# Patient Record
Sex: MALE | Race: WHITE | Employment: OTHER | URBAN - METROPOLITAN AREA
[De-identification: names, ages, dates, MRNs, and addresses within clinical notes are randomized per-mention and may not be internally consistent; named-entity substitution may affect disease eponyms.]

---

## 2021-04-05 ENCOUNTER — HOSPITAL ENCOUNTER (OUTPATIENT)
Dept: WOUND CARE | Age: 86
Discharge: HOME | End: 2021-04-05
Admitting: SURGERY
Payer: COMMERCIAL

## 2021-04-05 VITALS
TEMPERATURE: 96.2 F | HEART RATE: 51 BPM | SYSTOLIC BLOOD PRESSURE: 152 MMHG | RESPIRATION RATE: 16 BRPM | DIASTOLIC BLOOD PRESSURE: 60 MMHG

## 2021-04-05 DIAGNOSIS — L97.912 NON-PRESSURE CHRONIC ULCER OF RIGHT LOWER LEG, WITH FAT LAYER EXPOSED (HCC): ICD-10-CM

## 2021-04-05 DIAGNOSIS — R60.0 BILATERAL LEG EDEMA: ICD-10-CM

## 2021-04-05 PROCEDURE — 99203 OFFICE O/P NEW LOW 30 MIN: CPT | Performed by: SURGERY

## 2021-04-05 PROCEDURE — 29581 APPL MULTLAYER CMPRN SYS LEG: CPT

## 2021-04-05 NOTE — DISCHARGE INSTRUCTIONS
Discharge Instructions/Wound Orders  [address]  [address] [facility], [address] [facility]  Telephone: [phone]    NAME:  Chaz Barcenas  YOB: 1931  MEDICAL RECORD NUMBER:  [id number]  DATE:  4/5/2021  Wound Care Orders:  Right medial lower leg - cleanse with saline, apply Vaseline to dry intact skin, apply xeroform, cover with ABDs, apply 3 layer wrap at 50% stretch. Home Health to change dressing 2 x week. Follow-up with MD in 1 week. Home Health skilled nursing to admit patient for skilled nursing wound care as noted above, Next dressing change due Thursday 04/08/21. Dietary:  [x] Diet as tolerated: [] Calorie Diabetic Diet:Low carb and no Sugar [x] No Added Salt:[x] Increase Protein: [] Other:Limit the amount of liquid you are drinking and avoid drinking in between meals   Activity:  [x] Activity as tolerated:  [] Patient has no activity restrictions     [] Strict Bedrest: [] Remain off Work:     [] May return to full duty work:                                   [] Return to work with restrictions:             Return Appointment:  [x] Return Appointment: With DR Lauri Quintero  in   [facility])  [] Ordered tests:    Electronically signed Linda Perez RN on 4/5/2021 at 9:44 AM     [facility]  [address] [facility] 13 April 8, 2021  10:00 arrive 9:30    Lame Kwame Pelayo 281: Should you experience any significant changes in your wound(s) or have questions about your wound care, please contact the [address] [place] [address] at [address] [place] [address] 8:00 am - 4:30. If you need help with your wound outside these hours and cannot wait until we are again available, contact your PCP or go to the hospital emergency room. PLEASE NOTE: IF YOU ARE UNABLE TO OBTAIN WOUND SUPPLIES, CONTINUE TO USE THE SUPPLIES YOU HAVE AVAILABLE UNTIL YOU ARE ABLE TO REACH US.  IT IS MOST IMPORTANT TO KEEP THE WOUND COVERED AT ALL TIMES.      Physician Signature:_______________________    Date: ___________ Time:  ____________

## 2021-04-05 NOTE — WOUND CARE
04/05/21 0715 Wound Leg lower Right;Medial;Proximal #1 04/05/21 Date First Assessed/Time First Assessed: 04/05/21 0901   Present on Hospital Admission: Yes  Wound Approximate Age at First Assessment (Weeks): 16 weeks  Primary Wound Type: Venous  Location: Leg lower  Wound Location Orientation: Right;Medial;Proxima. .. Wound Image Wound Etiology Venous Dressing Status Old drainage noted Cleansed Cleansed with saline; Soap and water Dressing/Treatment (Silver, 2 layer wrap) Wound Length (cm) 10.2 cm Wound Width (cm) 1 cm Wound Depth (cm) 0.1 cm Wound Surface Area (cm^2) 10.2 cm^2 Wound Volume (cm^3) 1.02 cm^3 Wound Assessment Granulation tissue Drainage Amount Moderate Drainage Description Serosanguinous Wound Odor None Allie-Wound/Incision Assessment Intact Edges Attached edges Wound Thickness Description Partial thickness Wound Leg lower Right;Medial;Distal #2 04/05/21 Date First Assessed/Time First Assessed: 04/05/21 0902   Present on Hospital Admission: Yes  Wound Approximate Age at First Assessment (Weeks): 16 weeks  Primary Wound Type: Venous  Location: Leg lower  Wound Location Orientation: Right;Medial;Distal ... Wound Image Wound Etiology Venous Dressing Status Old drainage noted Cleansed Cleansed with saline; Soap and water Dressing/Treatment (Silver, 2 layer wrap) Wound Length (cm) 3.6 cm Wound Width (cm) 5 cm Wound Depth (cm) 0.1 cm Wound Surface Area (cm^2) 18 cm^2 Wound Volume (cm^3) 1.8 cm^3 Wound Assessment Granulation tissue Drainage Amount Moderate Drainage Description Serosanguinous Wound Odor None Allie-Wound/Incision Assessment Intact Edges Attached edges Wound Thickness Description Partial thickness Visit Vitals BP (!) 152/60 (BP 1 Location: Right upper arm, BP Patient Position: At rest;Sitting) Pulse (!) 51 Temp (!) 96.2 °F (35.7 °C) Resp 16

## 2021-04-05 NOTE — PROGRESS NOTES
Face to Face Documentation for [address] Name: Carmen Mijares    YOB: 1931    Date of Face to Face:  4/5/2021                                    Face to Face Encounter findings are related to primary reason for home care:   yes    I certify that this patient is under my care and has a Face to Face Encounter related to the primary reason for home health. 1. I certify that the patient needs intermittent skilled nursing care. 2. I certify that this patient is homebound for the following reason(s): Requires considerable and taxing effort to leave the home , Requires the assistance of 1 or more persons to leave the home  and Only leaves the home for medical reasons or Yarsani services and are infrequent and of short duration for other reasons     3.  The qualifying diagnosis is Non pressure ulcer with fat layer exposed right lower leg (Z90.084), bilateral leg edema (R60.0)        Jn Elam MD 4/5/2021 10:17 AM

## 2021-04-05 NOTE — H&P
[address]  HISTORY AND PHYSICAL    Name:  Kyler Pro  MR#:  [id number]  :  1931  ACCOUNT #:  [de-identified]  ADMIT DATE:  2021    HISTORY OF PRESENT ILLNESS:  The patient is an 79-year-old man referred to the [address] [place] [place] [address] regarding nonhealing wound on the right lower leg. The patient approximately 4 months ago fell and developed a large hematoma associated with his being on Coumadin. He has history of atrial fibrillation. He had evacuation of the hematoma performed surgically and had wound care at the [address] [place] [address] in [place] where he lived. The patient has recently moved to an independent living facility here in the [place] area to be close to his son. The patient was accompanied by his son at the visit today. The patient has received a Watchman and is no longer taking any anticoagulation. The patient had been sleeping in a recliner. Presently he is sleeping in a bed which does have the ability to elevate. He sleeps in bed with the head of bed elevated, by his description, approximately 30 degrees. He also elevates the foot of the bed slightly. Foot is slightly below heart level when he sleeps. He uses this position because of back discomfort. The patient is ambulatory. He denies shortness of breath at rest or with exertion. The patient has no history of diabetes. He has history of atrial fibrillation and history of a Watchman procedure. He denies history of MI or coronary intervention. There are no anginal symptoms. No history of coronary intervention. The patient does have history of hypertension and history of stroke. The patient smoked 2 packs per day for 30 years and quit in . The patient's medications include furosemide. He does not know the dose. He does take that medication daily. He does not really notice a diuresis after taking the furosemide. He reports drinking about 4 glasses of water per day.   His diuretics are managed by his cardiologist.  He is in the process of getting a new primary care physician here in [facility] and will in a few weeks be getting a new cardiologist also. PHYSICAL EXAMINATION  VITAL SIGNS:  Blood pressure 152/60, pulse 51, respirations 16, temperature 96.2. HEAD AND NECK:  Examination showed no jaundice. LUNGS:  Clear bilaterally without rales, rhonchi or wheezes. HEART:  Regular without murmur, gallop or rub. NEUROLOGIC:  The patient is alert and oriented. He moves all extremities equally. Facial movement is symmetrical.  Speech is normal.  LOWER EXTREMITIES:  Examination of the lower extremities on the left revealed nonpalpable dorsalis pedis and posterior tibial pulses. There is 1+ edema in the left lower extremity noted from the midthigh distally. There were no ulcerations on the left. Examination of the right lower extremity revealed a 2+ dorsalis pedis pulse. Right posterior tibial pulse was not palpable. There is 1+ pitting edema in the right lower extremity from the midthigh distally. There were on the medial aspect of the right lower leg a cluster of individual ulcers with total clustered dimension 10.2 x 1 x 0.1 cm. Individual ulcers were substantially smaller and were scattered. They had clean pink base. I recommended the patient at night try as much as possible to achieve a position where the right foot was level with his heart. I ordered a venous duplex scan of both lower extremities to assess for venous insufficiency. Dressing Ordered:  Xeroform applied over the ulcers then a triple layer compression wrap applied from the base of the toes to the upper calf on the right with reduced (0.50) stretch. The patient will have home health for changes of dressing twice per week. The patient has no limits to ambulation with respect to his compression dressing. He will need to use a cast cover if he wishes to shower.     The patient will see me in followup in the [address] [place] [address] in 9-10 days. FINAL DIAGNOSES:  Nonpressure ulcer right lower leg with fat layer exposed, bilateral leg edema.           Fallon Parker MD      [id number]  D:  04/05/2021 9:57  T:  04/05/2021 12:46  JOB #:  [id number]

## 2021-04-08 ENCOUNTER — HOSPITAL ENCOUNTER (OUTPATIENT)
Dept: WOUND CARE | Age: 86
Discharge: HOME | End: 2021-04-08
Admitting: SURGERY
Payer: COMMERCIAL

## 2021-04-08 VITALS
TEMPERATURE: 98 F | RESPIRATION RATE: 16 BRPM | HEART RATE: 58 BPM | DIASTOLIC BLOOD PRESSURE: 66 MMHG | SYSTOLIC BLOOD PRESSURE: 149 MMHG

## 2021-04-08 PROCEDURE — 29581 APPL MULTLAYER CMPRN SYS LEG: CPT

## 2021-04-08 RX ORDER — FINASTERIDE 5 MG/1
5 TABLET, FILM COATED ORAL DAILY
COMMUNITY

## 2021-04-08 RX ORDER — METOPROLOL TARTRATE 50 MG/1
TABLET ORAL DAILY
COMMUNITY

## 2021-04-08 RX ORDER — ATORVASTATIN CALCIUM 20 MG/1
20 TABLET, FILM COATED ORAL DAILY
COMMUNITY

## 2021-04-08 RX ORDER — AMIODARONE HYDROCHLORIDE 200 MG/1
200 TABLET ORAL
COMMUNITY

## 2021-04-08 RX ORDER — FUROSEMIDE 20 MG/1
TABLET ORAL DAILY
COMMUNITY

## 2021-04-08 RX ORDER — LISINOPRIL 20 MG/1
TABLET ORAL DAILY
COMMUNITY

## 2021-04-08 NOTE — WOUND CARE
Multilayer Compression Wrap 
 (Not Unna) Below the Knee NAME:  Alverto Asher YOB: 1931 MEDICAL RECORD NUMBER:  [id number] DATE:  4/8/2021 NURSE VISIT Removed old Multilayer wrap if indicated and wash leg with mild soap/water. Applied moisturizing agent to dry skin as needed. Applied primary and secondary dressing as ordered. Applied multilayered dressing below the knee to right lower leg. Instructed patient/caregiver not to remove dressing and to keep it clean and dry. Instructed patient/caregiver on complications to report to provider, such as pain, numbness in toes, heavy drainage, and slippage of dressing. Instructed patient on purpose of compression dressing and on activity and exercise recommendations. (Xeform, ABD pad, 3 layer compression wrap (Applied w/50% standard stretch) Electronically signed by Annabelle Hernández RN on 4/8/2021 at 11:50 AM

## 2021-04-14 ENCOUNTER — HOSPITAL ENCOUNTER (OUTPATIENT)
Dept: WOUND CARE | Age: 86
Discharge: HOME | End: 2021-04-14
Admitting: SURGERY
Payer: COMMERCIAL

## 2021-04-14 VITALS
TEMPERATURE: 97.8 F | SYSTOLIC BLOOD PRESSURE: 139 MMHG | HEART RATE: 56 BPM | DIASTOLIC BLOOD PRESSURE: 62 MMHG | RESPIRATION RATE: 16 BRPM

## 2021-04-14 DIAGNOSIS — R60.0 BILATERAL LEG EDEMA: ICD-10-CM

## 2021-04-14 DIAGNOSIS — L97.912 NON-PRESSURE CHRONIC ULCER OF RIGHT LOWER LEG, WITH FAT LAYER EXPOSED (HCC): ICD-10-CM

## 2021-04-14 PROCEDURE — 99213 OFFICE O/P EST LOW 20 MIN: CPT | Performed by: SURGERY

## 2021-04-14 PROCEDURE — 29581 APPL MULTLAYER CMPRN SYS LEG: CPT

## 2021-04-14 NOTE — WOUND CARE
04/14/21 1420   Wound Leg lower Right;Medial;Proximal #1 04/05/21   Date First Assessed/Time First Assessed: 04/05/21 0901   Present on Hospital Admission: Yes  Wound Approximate Age at First Assessment (Weeks): 16 weeks  Primary Wound Type: Venous  Location: Leg lower  Wound Location Orientation: Right;Medial;Proxima. .. Dressing/Treatment   (Xeroform, ABD, 3 layer wrap 50%)   Multilayer Compression Wrap   (Not Unna) Below the Knee    NAME:  James Rios  YOB: 1931  MEDICAL RECORD NUMBER:  [id number]  DATE:  4/14/2021    Removed old Multilayer wrap if indicated and wash leg with mild soap/water. Applied moisturizing agent to dry skin as needed. Applied primary and secondary dressing as ordered. Applied multilayered dressing below the knee to right lower leg. Instructed patient/caregiver not to remove dressing and to keep it clean and dry. Instructed patient/caregiver on complications to report to provider, such as pain, numbness in toes, heavy drainage, and slippage of dressing. Instructed patient on purpose of compression dressing and on activity and exercise recommendations.       Electronically signed by Kathleen Hernández RN on 4/14/2021 at 3:46 PM

## 2021-04-14 NOTE — DISCHARGE INSTRUCTIONS
Discharge Instructions for  [facility]  [address], [address] [facility]  Telephone: [phone]    NAME:  Clydene Nageotte  YOB: 1931  MEDICAL RECORD NUMBER:  [id number]  DATE:  4/14/2021  WOUND CARE ORDERS:  Right medial lower leg - cleanse with saline, apply xeroform, cover with ABDs, apply 3 layer wrap at 50% stretch. Home Health to change dressing 2 x week. Follow-up with MD in 1 week. TREATMENT ORDERS:    Elevate leg(s) above the level of the heart when sitting. Avoid prolonged standing in one place. Do no get dressing/wrap wet. Follow Diet as prescribed:   [x] Diet as tolerated: [] Calorie Diabetic Diet: Low carb and no Sugar [x] No Added Salt:  [] Increase Protein: [x] Limit the amount of liquid you are drinking and avoid drinking in between meals           Return Appointment:  [x] Return Appointment: With DR Ingrid Hill  in  [facility])  [] Nurse Visit : *** days  [] Ordered tests:    Electronically signed Jerrica Oropeza RN on 4/14/2021 at 2:24 PM     Ashanti Crook 281: Should you experience any significant changes in your wound(s) or have questions about your wound care, please contact the [address] [place] [address] at [address] [place] [address] 8:00 am - 4:30. If you need help with your wound outside these hours and cannot wait until we are again available, contact your PCP or go to the hospital emergency room. PLEASE NOTE: IF YOU ARE UNABLE TO OBTAIN WOUND SUPPLIES, CONTINUE TO USE THE SUPPLIES YOU HAVE AVAILABLE UNTIL YOU ARE ABLE TO REACH US. IT IS MOST IMPORTANT TO KEEP THE WOUND COVERED AT ALL TIMES.      Physician Signature:_______________________    Date: ___________ Time:  ____________

## 2021-04-14 NOTE — WOUND CARE
04/14/21 1352   Wound Leg lower Right;Medial;Proximal #1 04/05/21   Date First Assessed/Time First Assessed: 04/05/21 0901   Present on Hospital Admission: Yes  Wound Approximate Age at First Assessment (Weeks): 16 weeks  Primary Wound Type: Venous  Location: Leg lower  Wound Location Orientation: Right;Medial;Proxima. .. Wound Image    Wound Etiology Venous   Dressing Status   (removed xeroform, abd, 3 layer wrap)   Cleansed Cleansed with saline   Wound Length (cm) 9.4 cm   Wound Width (cm) 1.3 cm   Wound Depth (cm) 0.1 cm   Wound Surface Area (cm^2) 12.22 cm^2   Change in Wound Size % -19.8   Wound Volume (cm^3) 1.22 cm^3   Wound Healing % -20   Wound Assessment Granulation tissue   Drainage Amount Small   Drainage Description Serosanguinous   Wound Odor None   Allie-Wound/Incision Assessment Fragile   Edges Attached edges   Wound Thickness Description Partial thickness   [REMOVED] Wound Leg lower Right;Medial;Distal #2 04/05/21   Final Assessment Date/Final Assessment Time: 04/14/21 1410  Date First Assessed/Time First Assessed: 04/05/21 0902   Present on Hospital Admission: Yes  Wound Approximate Age at First Assessment (Weeks): 16 weeks  Primary Wound Type: Venous  Location:. .. Wound Image    Wound Etiology Venous   Dressing Status Old drainage noted   Cleansed Cleansed with saline; Soap and water   Wound Length (cm) 0 cm   Wound Width (cm) 0 cm   Wound Depth (cm) 0 cm   Wound Surface Area (cm^2) 0 cm^2   Change in Wound Size % 100   Wound Volume (cm^3) 0 cm^3   Wound Healing % 100   Wound Assessment   (appears to be healed)   Allie-Wound/Incision Assessment Fragile     Visit Vitals  /62 (BP 1 Location: Right upper arm, BP Patient Position: At rest)   Pulse (!) 56   Temp 97.8 °F (36.6 °C)   Resp 16

## 2021-04-14 NOTE — PROGRESS NOTES
HISTORY OF PRESENT ILLNESS:  The patient is an 51-year-old man referred to the [address] [place] [address] regarding nonhealing wound on the right lower leg. The patient approximately 4 months ago fell and developed a large hematoma associated with his being on Coumadin. He has history of atrial fibrillation. He had evacuation of the hematoma performed surgically and had wound care at the [address] [place] [address] in [place] where he lived. The patient has recently moved to an independent living facility here in the [facility] to be close to his son. The patient was accompanied by his son at the visit today.     The patient was first seen at the [address] [place] [address] on 4/5/2021. The patient has received a Watchman and is no longer taking any anticoagulation.     The patient had been sleeping in a recliner. Presently he is sleeping in a bed which does have the ability to elevate. He sleeps in bed with the head of bed elevated, by his description, approximately 30 degrees. He also elevates the foot of the bed slightly. Foot is slightly below heart level when he sleeps. He uses this position because of back discomfort.     The patient is ambulatory. He denies shortness of breath at rest or with exertion.     The patient has no history of diabetes. He has history of atrial fibrillation and history of a Watchman procedure. He denies history of MI or coronary intervention. There are no anginal symptoms. No history of coronary intervention.     The patient does have history of hypertension and history of stroke. The patient smoked 2 packs per day for 30 years and quit in 1995.     The patient's medications include furosemide 20 mg every other day. He does not really notice a diuresis after taking the furosemide. He reports drinking about 4 glasses of water per day.   His diuretics are managed by his cardiologist.  He is in the process of getting a new primary care physician here in [facility] and will in a few weeks be getting a new cardiologist also. He has been instructed to reduce fluid intake. Venous US at [address] [facility] on 4/8/2021 showed no venous obstruction. Reflux in right calf  only. No reflux at all on left.       PHYSICAL EXAMINATION    Alert elderly man, NAD. LOWER EXTREMITIES:  Examination of the lower extremities on the left revealed nonpalpable dorsalis pedis and posterior tibial pulses. There is 1+ edema in the left lower extremity noted from the midthigh distally. There were no ulcerations on the left.     Examination of the right lower extremity revealed a 2+ dorsalis pedis pulse. Right posterior tibial pulse was not palpable. There is 1+ pitting edema in the right lower extremity from the midthigh distally. Edema decreased under wrap. There were on the medial aspect of the right lower leg a cluster of individual ulcers with total clustered dimension 9.4 x 1.3 x 0.1 cm. Individual ulcers were  smaller and were scattered. They had clean pink base.         I recommended the patient at night try as much as possible to achieve a position where the right foot was level with his heart.     Minimal venous reflux in the right lower leg. Most of edema is systemic in origin.     Dressing Ordered:  Xeroform applied over the ulcers then a triple layer compression wrap applied from the base of the toes to the upper calf on the right with reduced (0.50) stretch.     The patient will have home health for changes of dressing once per week.     The patient has no limits to ambulation with respect to his compression dressing.   He will need to use a cast cover if he wishes to shower.     The patient will see me in followup in the [address] [place] [address] in 1 week.     FINAL DIAGNOSES:  Nonpressure ulcer right lower leg with fat layer exposed, bilateral leg edema.      J11.157, R60.0        Robert Hilton MD

## 2021-04-22 ENCOUNTER — HOSPITAL ENCOUNTER (OUTPATIENT)
Dept: WOUND CARE | Age: 86
Discharge: HOME | End: 2021-04-22
Payer: COMMERCIAL

## 2021-04-22 VITALS
RESPIRATION RATE: 16 BRPM | DIASTOLIC BLOOD PRESSURE: 65 MMHG | HEART RATE: 48 BPM | SYSTOLIC BLOOD PRESSURE: 141 MMHG | TEMPERATURE: 98.1 F

## 2021-04-22 PROCEDURE — 29581 APPL MULTLAYER CMPRN SYS LEG: CPT

## 2021-04-22 NOTE — WOUND CARE
04/22/21 1416 Wound Leg lower Right;Medial;Proximal #1 04/05/21 Date First Assessed/Time First Assessed: 04/05/21 0901   Present on Hospital Admission: Yes  Wound Approximate Age at First Assessment (Weeks): 16 weeks  Primary Wound Type: Venous  Location: Leg lower  Wound Location Orientation: Right;Medial;Proxima. .. Wound Etiology Venous Dressing Status (xeroform, abd, 3 layer) Cleansed Cleansed with saline Dressing/Treatment  
(xeroform, abd, 3 layer wrap) Wound Assessment Granulation tissue 
(scabbed) Drainage Amount Small Drainage Description Serosanguinous Wound Odor None Allie-Wound/Incision Assessment Fragile Edges Attached edges Wound Thickness Description Partial thickness Visit Vitals BP (!) 141/65 (BP 1 Location: Left upper arm, BP Patient Position: At rest) Pulse (!) 48 Temp 98.1 °F (36.7 °C) Resp 16 Multilayer Compression Wrap 
 (Not Unna) Below the Knee NAME:  Hannah Araiza YOB: 1931 MEDICAL RECORD NUMBER:  [id number] DATE:  4/22/2021 Removed old Multilayer wrap if indicated and wash leg with mild soap/water. Applied moisturizing agent to dry skin as needed. Applied primary and secondary dressing as ordered. Applied multilayered dressing below the knee to right lower leg. Instructed patient/caregiver not to remove dressing and to keep it clean and dry. Instructed patient/caregiver on complications to report to provider, such as pain, numbness in toes, heavy drainage, and slippage of dressing. Instructed patient on purpose of compression dressing and on activity and exercise recommendations.  
 
 
Electronically signed by Eros Montilla RN on 4/22/2021 at 2:23 PM

## 2021-04-30 ENCOUNTER — HOSPITAL ENCOUNTER (OUTPATIENT)
Dept: WOUND CARE | Age: 86
Discharge: HOME | End: 2021-04-30
Admitting: SURGERY
Payer: COMMERCIAL

## 2021-04-30 VITALS
RESPIRATION RATE: 16 BRPM | TEMPERATURE: 97.3 F | SYSTOLIC BLOOD PRESSURE: 146 MMHG | HEART RATE: 56 BPM | DIASTOLIC BLOOD PRESSURE: 66 MMHG

## 2021-04-30 DIAGNOSIS — R60.0 BILATERAL LEG EDEMA: ICD-10-CM

## 2021-04-30 DIAGNOSIS — L97.912 NON-PRESSURE CHRONIC ULCER OF RIGHT LOWER LEG, WITH FAT LAYER EXPOSED (HCC): ICD-10-CM

## 2021-04-30 PROCEDURE — 29581 APPL MULTLAYER CMPRN SYS LEG: CPT

## 2021-04-30 PROCEDURE — 99213 OFFICE O/P EST LOW 20 MIN: CPT | Performed by: SURGERY

## 2021-04-30 NOTE — PROGRESS NOTES
HISTORY OF PRESENT ILLNESS:  The patient is an 40-year-old man referred to the [address] [place] [address] Road regarding nonhealing wound on the right lower leg.  The patient approximately 4 months ago fell and developed a large hematoma associated with his being on Coumadin. Pineda Gil has history of atrial fibrillation.  He had evacuation of the hematoma performed surgically and had wound care at the [address] [place] [address] Road in [facility] where he lived. Alejandra patient has recently moved to an independent living facility here in the [facility] to be close to his son. Alejandra patient was accompanied by his son at the visit today.     The patient was first seen at the [address] [place] [address] on 4/5/2021.     The patient has received a Watchman and is no longer taking any anticoagulation.     The patient had been sleeping in a recliner.  Presently he is sleeping in a bed which does have the ability to elevate.  He sleeps in bed with the head of bed elevated, by his description, approximately 30 degrees.  He also elevates the foot of the bed slightly.  Foot is slightly below heart level when he sleeps. Pineda Gil uses this position because of back discomfort.     The patient is ambulatory.  He denies shortness of breath at rest or with exertion.     The patient has no history of diabetes.  He has history of atrial fibrillation and history of a Watchman procedure. Pineda Gil denies history of MI or coronary intervention.  There are no anginal symptoms.  No history of coronary intervention.     The patient does have history of hypertension and history of stroke.  The patient smoked 2 packs per day for 30 years and quit in 1995.     The patient's medications include furosemide 20 mg every other day.  Pineda Gil does not really notice a diuresis after taking the furosemide.  He reports drinking about 4 glasses of water per day.  His diuretics are managed by his cardiologist. Pineda Gil is in the process of getting a new primary care physician here in [facility] and will in a few weeks be getting a new cardiologist also. He has reduced fluid intake.     Venous US at [address] [facility] [place] on 4/8/2021 showed no venous obstruction. Reflux in right calf  only. No reflux at all on left. Current dressing:  Xeroform applied over the ulcers then a triple layer compression wrap applied from the base of the toes to the upper calf on the right with reduced (0.50) stretch.        PHYSICAL EXAMINATION     Alert elderly man, NAD.     LOWER EXTREMITIES:  Examination of the lower extremities on the left revealed nonpalpable dorsalis pedis and posterior tibial pulses.  There is 1+pitting edema in the left lower extremity noted from the midthigh distally.  There were no ulcerations on the left.     Examination of the right lower extremity revealed a 2+ dorsalis pedis pulse.  Right posterior tibial pulse was not palpable.  There is 1+ pitting edema in the right lower extremity from the midthigh distally.  Edema decreased under wrap. There were on the medial aspect of the right lower leg a cluster of individual ulcers with total clustered dimension 2.5 x 1.5 x 0.1 cm.  Individual ulcers were  smaller and were scattered. Shmuel Gilbert had clean pink base, some epithelium.           I recommended the patient at night try as much as possible to achieve a position where the right foot was level with his heart.     Minimal venous reflux in the right lower leg. No venous intervention planned. Most of edema is systemic in origin.     Dressing Ordered:  Xeroform applied over the ulcers then a triple layer compression wrap applied from the base of the toes to the upper calf on the right with reduced (0.50) stretch.     The patient will have home health for changes of dressing once per week.     The patient has no limits to ambulation with respect to his compression dressing.  He will need to use a cast cover if he wishes to shower.     The patient will see me in followup in the [address] [place] [address] in 2 weeks.     Plan elastic compression stockings after ulcers healed.     FINAL DIAGNOSES:  Nonpressure ulcer right lower leg with fat layer exposed, bilateral leg edema.      U06.718, R60.0        Robert Hilton MD

## 2021-04-30 NOTE — DISCHARGE INSTRUCTIONS
Discharge Instructions for  [facility]  [address] [facility] [address] [facility]  Telephone: [phone]    NAME:  Harmeet Gaspar  YOB: 1931  MEDICAL RECORD NUMBER:  [id number]  DATE:  4/30/2021     WOUND CARE ORDERS:Right lower leg - Cleanse Leg with mild soap and water, cleanse wound  with saline. Apply xeroform, cover with ABDs, apply 3 layer wrap at 50% stretch. Dressing to be changed once week (Lary Love to change dressing next week). MD Follow-up in 2 weeks. TREATMENT ORDERS:    Elevate leg(s)  when sitting. Avoid prolonged standing in one place. Do not get dressing/wrap wet. Take Diuretic (furosemide) as prescribed. Speak w/your Cardiologist about possibly increasing dosage. Follow Diet as prescribed:   [] Diet as tolerated: [] Calorie Diabetic Diet: Low carb and no Sugar [x] No Added Salt  [] Increase Protein: [] Limit the amount of liquid you are drinking and avoid drinking in between meals           Return Appointment:  [x] Return Appointment: With DR Isrrael Bazzi  in [facility])  [] Nurse Visit : *** days  [] Ordered tests:    Electronically signed Kristy Matias RN on 4/30/2021 at 8:49 AM     [address] [place] [address] Road Information: Should you experience any significant changes in your wound(s) or have questions about your wound care, please contact the [address] [place] [address] at [address] [place] [address] 8:00 am - 4:30. If you need help with your wound outside these hours and cannot wait until we are again available, contact your PCP or go to the hospital emergency room. PLEASE NOTE: IF YOU ARE UNABLE TO OBTAIN WOUND SUPPLIES, CONTINUE TO USE THE SUPPLIES YOU HAVE AVAILABLE UNTIL YOU ARE ABLE TO REACH US. IT IS MOST IMPORTANT TO KEEP THE WOUND COVERED AT ALL TIMES.      Physician Signature:_______________________    Date: ___________ Time:  ____________

## 2021-04-30 NOTE — WOUND CARE
04/30/21 3280 Wound Leg lower Right;Medial;Proximal #1 04/05/21 Date First Assessed/Time First Assessed: 04/05/21 0901   Present on Hospital Admission: Yes  Wound Approximate Age at First Assessment (Weeks): 16 weeks  Primary Wound Type: Venous  Location: Leg lower  Wound Location Orientation: Right;Medial;Proxima. .. Wound Image Wound Etiology Venous Dressing Status Old drainage noted Cleansed Cleansed with saline Wound Length (cm) 2.5 cm (Posterior portion of \"clustered wound\" only) Wound Width (cm) 1.5 cm Wound Depth (cm) 0.1 cm Wound Surface Area (cm^2) 3.75 cm^2 Change in Wound Size % 63.24 Wound Volume (cm^3) 0.38 cm^3 Wound Healing % 63 Wound Assessment Epithelialization;Pink/red (Portionsof clusted wound epithelialized) Drainage Amount Small Drainage Description Serosanguinous Wound Odor None Allie-Wound/Incision Assessment Fragile Edges Flat/open edges Wound Thickness Description Partial thickness Visit Vitals BP (!) 146/66 (BP 1 Location: Left upper arm, BP Patient Position: Sitting) Pulse (!) 56 Temp 97.3 °F (36.3 °C) Resp 16

## 2021-05-14 ENCOUNTER — HOSPITAL ENCOUNTER (OUTPATIENT)
Dept: WOUND CARE | Age: 86
Discharge: HOME | End: 2021-05-14
Admitting: SURGERY
Payer: COMMERCIAL

## 2021-05-14 VITALS
RESPIRATION RATE: 16 BRPM | SYSTOLIC BLOOD PRESSURE: 158 MMHG | TEMPERATURE: 97 F | HEART RATE: 50 BPM | DIASTOLIC BLOOD PRESSURE: 67 MMHG

## 2021-05-14 DIAGNOSIS — R60.0 BILATERAL LEG EDEMA: ICD-10-CM

## 2021-05-14 DIAGNOSIS — L97.912 NON-PRESSURE CHRONIC ULCER OF RIGHT LOWER LEG, WITH FAT LAYER EXPOSED (HCC): ICD-10-CM

## 2021-05-14 PROCEDURE — 99212 OFFICE O/P EST SF 10 MIN: CPT

## 2021-05-14 PROCEDURE — 99213 OFFICE O/P EST LOW 20 MIN: CPT | Performed by: SURGERY

## 2021-05-14 NOTE — PROGRESS NOTES
HISTORY OF PRESENT ILLNESS:  The patient is an 66-year-old man referred to the [address] [facility] Road regarding nonhealing wound on the right lower leg.  The patient approximately 4 months ago fell and developed a large hematoma associated with his being on Coumadin. Cece Zimmer has history of atrial fibrillation.  He had evacuation of the hematoma performed surgically and had wound care at the [address] [place] [address] Road in [facility] where he lived. Alejandra patient has recently moved to an independent living facility here in the [facility] to be close to his son. Alejandra patient was accompanied by his son at the visit today.     The patient was first seen at [facility] on 4/5/2021.     The patient has received a Watchman and is no longer taking any anticoagulation.     The patient had been sleeping in a recliner.  Presently he is sleeping in a bed which does have the ability to elevate.  He sleeps in bed with the head of bed elevated, by his description, approximately 30 degrees.  He also elevates the foot of the bed slightly.  Foot is slightly below heart level when he sleeps. Cece Zimmer uses this position because of back discomfort.     The patient is ambulatory.  He denies shortness of breath at rest or with exertion.     The patient has no history of diabetes.  He has history of atrial fibrillation and history of a Watchman procedure. Cece Zimmer denies history of MI or coronary intervention.  There are no anginal symptoms.  No history of coronary intervention.     The patient does have history of hypertension and history of stroke.  The patient smoked 2 packs per day for 30 years and quit in 1995.     The patient's medications include furosemide 20 mg every other day.  Cardiologist increased to 20 mg daily.  He does not really notice a diuresis after taking the furosemide.  He hads reduced fluid intake.     Venous US at [address] [facility] on 4/8/2021 showed no venous obstruction.  Reflux in right calf  only.  No reflux at all on left.     Current dressing:  Xeroform applied over the ulcers then a triple layer compression wrap applied from the base of the toes to the upper calf on the right with reduced (0.50) stretch.        PHYSICAL EXAMINATION     Alert elderly man, NAD.     LOWER EXTREMITIES:  Examination of the lower extremities on the left revealed nonpalpable dorsalis pedis and posterior tibial pulses.  There is trace pitting edema in the left lower extremity noted from the knee distally.  There were no ulcerations on the left.     Examination of the right lower extremity revealed a 2+ dorsalis pedis pulse.  Right posterior tibial pulse was not palpable.  There is trace pitting edema in the right lower extremity from the knee distally.  Edema decreased under wrap. Ulcers all healed.        Ulcers healed. Much better edema control.       I recommended the patient at night try as much as possible to achieve a position where the right foot was level with his heart.     Minimal venous reflux in the right lower leg. No venous intervention planned.   Most of edema is systemic in origin.     Rx given for 15-20 mm Hg knee length compression stockings. The patient was instructed to wear the stockings at all times except when in bed or bathing.     Device for donning stocking discussed.     No follow up appointment needed.     FINAL DIAGNOSES:  Nonpressure ulcer right lower leg with fat layer exposed (healed), bilateral leg edema.      J52.998, R60.0        Elmer Trejo MD

## 2021-05-14 NOTE — WOUND CARE
05/14/21 0659 Left Leg Edema Point of Measurement Leg circumference 29 cm Ankle circumference 20.5 cm Compression Therapy 3 layer compression wrap LLE Peripheral Vascular Capillary Refill Less than/equal to 3 seconds Color Appropriate for race Temperature Warm Sensation Present Pedal Pulse Palpable Wound Leg lower Right;Medial;Proximal #1 04/05/21 Date First Assessed/Time First Assessed: 04/05/21 0901   Present on Hospital Admission: Yes  Wound Approximate Age at First Assessment (Weeks): 16 weeks  Primary Wound Type: Venous  Location: Leg lower  Wound Location Orientation: Right;Medial;Proxima. .. Wound Image Wound Etiology Venous Dressing Status  
(removed xeroform, abd pad, 3 layers) Cleansed Soap and water;Cleansed with saline Wound Length (cm) 0.1 cm Wound Width (cm) 0.1 cm Wound Depth (cm) 0.1 cm Wound Surface Area (cm^2) 0.01 cm^2 Change in Wound Size % 99.9 Wound Volume (cm^3) 0 cm^3 Wound Healing % 100 Wound Assessment Epithelialization Drainage Amount Scant Drainage Description Serosanguinous Wound Odor None Allie-Wound/Incision Assessment Hemosiderin staining (brown yellow) Pain 1 Pain Scale 1 Numeric (0 - 10) Pain Intensity 1 0 Patient Stated Pain Goal 0 Pain Reassessment 1 Yes Visit Vitals BP (!) 158/67 Pulse (!) 50 Temp 97 °F (36.1 °C) Resp 16

## 2021-05-14 NOTE — DISCHARGE INSTRUCTIONS
Discharge Instructions for  [facility]  [place], [address] [facility]  Telephone: [phone]    NAME:  Connro Blackburn  YOB: 1931  MEDICAL RECORD NUMBER:  [id number]  DATE:  5/14/2021  WOUND CARE ORDERS:  Right lower leg - Cleanse Leg with mild soap and water, rinse, pat dry, apply double tubigrip size E. Patient to wear compression stockings 15-20mm/hg to be worn daily, on in AM upon arising and off at bedtime. No further follow-up needed. TREATMENT ORDERS:    Elevate leg(s) above the level of the heart when sitting. Avoid prolonged standing in one place. Do no get dressing/wrap wet. Follow Diet as prescribed:   [x] Diet as tolerated: [] Calorie Diabetic Diet: Low carb and no Sugar [x] No Added Salt:  [] Increase Protein: [] Limit the amount of liquid you are drinking and avoid drinking in between meals           Return Appointment:  [x] Return Appointment: No further follow-up needed. [] Nurse Visit : *** days  [] Ordered tests:    Electronically signed Sharath Chowdary RN on 5/14/2021 at 9:29 AM     [address] [facility] Information: Should you experience any significant changes in your wound(s) or have questions about your wound care, please contact the [address] [place] [address] at [address] [place] [address] 8:00 am - 4:30. If you need help with your wound outside these hours and cannot wait until we are again available, contact your PCP or go to the hospital emergency room. PLEASE NOTE: IF YOU ARE UNABLE TO OBTAIN WOUND SUPPLIES, CONTINUE TO USE THE SUPPLIES YOU HAVE AVAILABLE UNTIL YOU ARE ABLE TO REACH US. IT IS MOST IMPORTANT TO KEEP THE WOUND COVERED AT ALL TIMES.      Physician Signature:_______________________    Date: ___________ Time:  ____________

## 2022-03-18 PROBLEM — L97.912 NON-PRESSURE CHRONIC ULCER OF RIGHT LOWER LEG, WITH FAT LAYER EXPOSED (HCC): Status: ACTIVE | Noted: 2021-04-05

## 2022-03-20 PROBLEM — R60.0 BILATERAL LEG EDEMA: Status: ACTIVE | Noted: 2021-04-05

## 2023-05-14 RX ORDER — LISINOPRIL 20 MG/1
TABLET ORAL DAILY
COMMUNITY

## 2023-05-14 RX ORDER — FUROSEMIDE 20 MG/1
TABLET ORAL DAILY
COMMUNITY

## 2023-05-14 RX ORDER — ATORVASTATIN CALCIUM 20 MG/1
20 TABLET, FILM COATED ORAL DAILY
COMMUNITY

## 2023-05-14 RX ORDER — METOPROLOL TARTRATE 50 MG/1
TABLET, FILM COATED ORAL DAILY
COMMUNITY

## 2023-05-14 RX ORDER — FINASTERIDE 5 MG/1
5 TABLET, FILM COATED ORAL DAILY
COMMUNITY

## 2023-05-14 RX ORDER — AMIODARONE HYDROCHLORIDE 200 MG/1
200 TABLET ORAL
COMMUNITY

## 2023-07-07 ENCOUNTER — HOSPITAL ENCOUNTER (EMERGENCY)
Facility: HOSPITAL | Age: 88
Discharge: HOME | End: 2023-07-07
Attending: EMERGENCY MEDICINE
Payer: COMMERCIAL

## 2023-07-07 VITALS
RESPIRATION RATE: 19 BRPM | HEART RATE: 76 BPM | OXYGEN SATURATION: 96 % | TEMPERATURE: 98.5 F | SYSTOLIC BLOOD PRESSURE: 139 MMHG | DIASTOLIC BLOOD PRESSURE: 66 MMHG

## 2023-07-07 DIAGNOSIS — L03.115 CELLULITIS OF RIGHT LOWER EXTREMITY: Primary | ICD-10-CM

## 2023-07-07 LAB
ANION GAP SERPL CALC-SCNC: 7 MMOL/L (ref 5–15)
BASOPHILS # BLD: 0.1 K/UL (ref 0–0.1)
BASOPHILS NFR BLD: 1 % (ref 0–1)
BUN SERPL-MCNC: 19 MG/DL (ref 6–20)
BUN/CREAT SERPL: 18 (ref 12–20)
CALCIUM SERPL-MCNC: 8.7 MG/DL (ref 8.5–10.1)
CHLORIDE SERPL-SCNC: 105 MMOL/L (ref 97–108)
CO2 SERPL-SCNC: 28 MMOL/L (ref 21–32)
CREAT SERPL-MCNC: 1.06 MG/DL (ref 0.7–1.3)
DIFFERENTIAL METHOD BLD: ABNORMAL
EOSINOPHIL # BLD: 0.4 K/UL (ref 0–0.4)
EOSINOPHIL NFR BLD: 5 % (ref 0–7)
ERYTHROCYTE [DISTWIDTH] IN BLOOD BY AUTOMATED COUNT: 13.9 % (ref 11.5–14.5)
GLUCOSE SERPL-MCNC: 120 MG/DL (ref 65–100)
HCT VFR BLD AUTO: 43.2 % (ref 36.6–50.3)
HGB BLD-MCNC: 13.4 G/DL (ref 12.1–17)
IMM GRANULOCYTES # BLD AUTO: 0 K/UL (ref 0–0.04)
IMM GRANULOCYTES NFR BLD AUTO: 0 % (ref 0–0.5)
LYMPHOCYTES # BLD: 1.7 K/UL (ref 0.8–3.5)
LYMPHOCYTES NFR BLD: 24 % (ref 12–49)
MCH RBC QN AUTO: 31 PG (ref 26–34)
MCHC RBC AUTO-ENTMCNC: 31 G/DL (ref 30–36.5)
MCV RBC AUTO: 100 FL (ref 80–99)
MONOCYTES # BLD: 0.7 K/UL (ref 0–1)
MONOCYTES NFR BLD: 9 % (ref 5–13)
NEUTS SEG # BLD: 4.5 K/UL (ref 1.8–8)
NEUTS SEG NFR BLD: 61 % (ref 32–75)
NRBC # BLD: 0 K/UL (ref 0–0.01)
NRBC BLD-RTO: 0 PER 100 WBC
PLATELET # BLD AUTO: 143 K/UL (ref 150–400)
PMV BLD AUTO: 11.5 FL (ref 8.9–12.9)
POTASSIUM SERPL-SCNC: 4 MMOL/L (ref 3.5–5.1)
RBC # BLD AUTO: 4.32 M/UL (ref 4.1–5.7)
SODIUM SERPL-SCNC: 140 MMOL/L (ref 136–145)
WBC # BLD AUTO: 7.2 K/UL (ref 4.1–11.1)

## 2023-07-07 PROCEDURE — 99284 EMERGENCY DEPT VISIT MOD MDM: CPT

## 2023-07-07 PROCEDURE — 96365 THER/PROPH/DIAG IV INF INIT: CPT

## 2023-07-07 PROCEDURE — 85025 COMPLETE CBC W/AUTO DIFF WBC: CPT

## 2023-07-07 PROCEDURE — 6360000002 HC RX W HCPCS: Performed by: EMERGENCY MEDICINE

## 2023-07-07 PROCEDURE — 36415 COLL VENOUS BLD VENIPUNCTURE: CPT

## 2023-07-07 PROCEDURE — 2580000003 HC RX 258: Performed by: EMERGENCY MEDICINE

## 2023-07-07 PROCEDURE — 80048 BASIC METABOLIC PNL TOTAL CA: CPT

## 2023-07-07 RX ORDER — SULFAMETHOXAZOLE AND TRIMETHOPRIM 800; 160 MG/1; MG/1
1 TABLET ORAL 2 TIMES DAILY
Qty: 20 TABLET | Refills: 0 | Status: SHIPPED | OUTPATIENT
Start: 2023-07-07 | End: 2023-07-17

## 2023-07-07 RX ORDER — CEPHALEXIN 500 MG/1
500 TABLET ORAL 2 TIMES DAILY
Qty: 20 TABLET | Refills: 0 | Status: SHIPPED | OUTPATIENT
Start: 2023-07-07 | End: 2023-07-14

## 2023-07-07 RX ADMIN — SODIUM CHLORIDE 1000 MG: 900 INJECTION INTRAVENOUS at 13:47

## 2023-07-07 NOTE — ED NOTES
Patient came in due to wound on his right leg. Has a blister over the weekend, worsen overnight. Patient is stable, bloods works done, awaiting results.       Victoriano Dowd RN  07/07/23 5252

## 2023-07-07 NOTE — CARE COORDINATION
CM acknowledges consult to MELISA for [address] [facility] SN wound care coordination. CM met with pt and son at bedside, offered freedom of choice, pt selected [address] (now [address]) and [facility]. Referrals have been sent via [address] Will update pt/family on accepting agency. Pt to return to [address] [place] [address] at d/c. Pt has been accepted by [address], [facility] Sunday 7/9. Pt/son updated and provided with office number.     Mandeep Arrington, [address] [facility], [address] [facility]  [phone]/Available on [employer]

## 2023-07-07 NOTE — ED PROVIDER NOTES
[facility] EMERGENCY DEPT  EMERGENCY DEPARTMENT ENCOUNTER       Pt Name: Rose Marie Ayon  MRN: [id number]  [address] [facility] 1931  Date of evaluation: 2023  Provider: Mar Tirado MD   PCP: Cj Restrepo MD  Note Started: 2:30 PM 23     CHIEF COMPLAINT       Chief Complaint   Patient presents with    Wound Check     Pt ambulatory into triage with a cc of right leg wound that started as a blister over the weekend; pt states that it has increased in size, redness and drainage over the last couple of nights; cardiology sent pt to ED for wound consult and further evaluation         HISTORY OF PRESENT ILLNESS: 1 or more elements      History From: Patient, History limited by: None     Rose Marie Ayon is a 80 y.o. male who presents with rash. Patient reports about 1 week ago he noted a blister to his anterior R shin. Over the past 24 hours, the blister has popped and he has had increasing pain, redness and warmth. Seen by cardiologist routinely today who sent him for evaluation. No fever, chills, nausea, or vomiting. Nursing Notes were all reviewed and agreed with or any disagreements were addressed in the HPI. REVIEW OF SYSTEMS        Positives and Pertinent negatives as per HPI. PAST HISTORY     Past Medical History:  Past Medical History:   Diagnosis Date    Arrhythmia 2017    a-fib    Hypertension     Stroke [facility])        Past Surgical History:  Past Surgical History:   Procedure Laterality Date    OTHER SURGICAL HISTORY  2021    Watchman placement    PACEMAKER      UROLOGICAL  2016    bladder stone removal       Family History:  No family history on file. Social History:  Social History     Tobacco Use    Smoking status: Former     Packs/day: 2.00     Types: Cigarettes     Quit date: 1995     Years since quittin.5    Smokeless tobacco: Never   Substance Use Topics    Alcohol use:  Yes     Alcohol/week: 1.0 standard drink    Drug use: Never       Allergies:  No Known Allergies    CURRENT

## 2023-07-07 NOTE — ED NOTES
Patient stable   IV cannula removed   Wound dressing done  Extra dressing given to relative  Discharged summary explained and understood      Braulio Lawler RN  07/07/23 2147

## 2024-06-27 ENCOUNTER — APPOINTMENT (OUTPATIENT)
Facility: HOSPITAL | Age: 89
End: 2024-06-27
Payer: MEDICARE

## 2024-06-27 ENCOUNTER — HOSPITAL ENCOUNTER (INPATIENT)
Facility: HOSPITAL | Age: 89
LOS: 14 days | Discharge: NURSING, REHAB OR LONG-TERM CARE | End: 2024-07-11
Attending: EMERGENCY MEDICINE | Admitting: INTERNAL MEDICINE
Payer: MEDICARE

## 2024-06-27 DIAGNOSIS — I49.9 ABNORMAL HEART RHYTHM: ICD-10-CM

## 2024-06-27 DIAGNOSIS — M86.9 OSTEOMYELITIS OF RIGHT FOOT, UNSPECIFIED TYPE (HCC): ICD-10-CM

## 2024-06-27 DIAGNOSIS — L97.509 DIABETIC FOOT ULCER WITH OSTEOMYELITIS (HCC): Primary | ICD-10-CM

## 2024-06-27 DIAGNOSIS — E11.69 DIABETIC FOOT ULCER WITH OSTEOMYELITIS (HCC): Primary | ICD-10-CM

## 2024-06-27 DIAGNOSIS — I48.21 PERMANENT ATRIAL FIBRILLATION (HCC): ICD-10-CM

## 2024-06-27 DIAGNOSIS — E11.621 DIABETIC FOOT ULCER WITH OSTEOMYELITIS (HCC): Primary | ICD-10-CM

## 2024-06-27 DIAGNOSIS — I50.82 BIVENTRICULAR CONGESTIVE HEART FAILURE (HCC): ICD-10-CM

## 2024-06-27 DIAGNOSIS — M86.9 DIABETIC FOOT ULCER WITH OSTEOMYELITIS (HCC): Primary | ICD-10-CM

## 2024-06-27 PROBLEM — M86.171 ACUTE OSTEOMYELITIS OF RIGHT FOOT (HCC): Status: ACTIVE | Noted: 2024-06-27

## 2024-06-27 LAB
ALBUMIN SERPL-MCNC: 3.3 G/DL (ref 3.5–5)
ALBUMIN/GLOB SERPL: 0.9 (ref 1.1–2.2)
ALP SERPL-CCNC: 84 U/L (ref 45–117)
ALT SERPL-CCNC: 21 U/L (ref 12–78)
ANION GAP SERPL CALC-SCNC: 9 MMOL/L (ref 5–15)
AST SERPL-CCNC: 27 U/L (ref 15–37)
BASOPHILS # BLD: 0 K/UL (ref 0–0.1)
BASOPHILS NFR BLD: 1 % (ref 0–1)
BILIRUB SERPL-MCNC: 1.2 MG/DL (ref 0.2–1)
BUN SERPL-MCNC: 22 MG/DL (ref 6–20)
BUN/CREAT SERPL: 20 (ref 12–20)
CALCIUM SERPL-MCNC: 9 MG/DL (ref 8.5–10.1)
CHLORIDE SERPL-SCNC: 108 MMOL/L (ref 97–108)
CO2 SERPL-SCNC: 24 MMOL/L (ref 21–32)
CREAT SERPL-MCNC: 1.11 MG/DL (ref 0.7–1.3)
CRP SERPL-MCNC: 4.72 MG/DL (ref 0–0.3)
DIFFERENTIAL METHOD BLD: ABNORMAL
EOSINOPHIL # BLD: 0.1 K/UL (ref 0–0.4)
EOSINOPHIL NFR BLD: 2 % (ref 0–7)
ERYTHROCYTE [DISTWIDTH] IN BLOOD BY AUTOMATED COUNT: 13.3 % (ref 11.5–14.5)
ERYTHROCYTE [SEDIMENTATION RATE] IN BLOOD: 23 MM/HR (ref 0–20)
GLOBULIN SER CALC-MCNC: 3.7 G/DL (ref 2–4)
GLUCOSE SERPL-MCNC: 107 MG/DL (ref 65–100)
HCT VFR BLD AUTO: 41 % (ref 36.6–50.3)
HGB BLD-MCNC: 13.5 G/DL (ref 12.1–17)
IMM GRANULOCYTES # BLD AUTO: 0 K/UL (ref 0–0.04)
IMM GRANULOCYTES NFR BLD AUTO: 1 % (ref 0–0.5)
LACTATE BLD-SCNC: 1.29 MMOL/L (ref 0.4–2)
LYMPHOCYTES # BLD: 1.4 K/UL (ref 0.8–3.5)
LYMPHOCYTES NFR BLD: 21 % (ref 12–49)
MCH RBC QN AUTO: 31.8 PG (ref 26–34)
MCHC RBC AUTO-ENTMCNC: 32.9 G/DL (ref 30–36.5)
MCV RBC AUTO: 96.5 FL (ref 80–99)
MONOCYTES # BLD: 0.8 K/UL (ref 0–1)
MONOCYTES NFR BLD: 12 % (ref 5–13)
NEUTS SEG # BLD: 4.2 K/UL (ref 1.8–8)
NEUTS SEG NFR BLD: 63 % (ref 32–75)
NRBC # BLD: 0 K/UL (ref 0–0.01)
NRBC BLD-RTO: 0 PER 100 WBC
PLATELET # BLD AUTO: 120 K/UL (ref 150–400)
PMV BLD AUTO: 12.3 FL (ref 8.9–12.9)
POTASSIUM SERPL-SCNC: 3.2 MMOL/L (ref 3.5–5.1)
PROCALCITONIN SERPL-MCNC: <0.05 NG/ML
PROT SERPL-MCNC: 7 G/DL (ref 6.4–8.2)
RBC # BLD AUTO: 4.25 M/UL (ref 4.1–5.7)
SODIUM SERPL-SCNC: 141 MMOL/L (ref 136–145)
WBC # BLD AUTO: 6.5 K/UL (ref 4.1–11.1)

## 2024-06-27 PROCEDURE — 71045 X-RAY EXAM CHEST 1 VIEW: CPT

## 2024-06-27 PROCEDURE — 84145 PROCALCITONIN (PCT): CPT

## 2024-06-27 PROCEDURE — 2580000003 HC RX 258: Performed by: INTERNAL MEDICINE

## 2024-06-27 PROCEDURE — 1100000003 HC PRIVATE W/ TELEMETRY

## 2024-06-27 PROCEDURE — 85652 RBC SED RATE AUTOMATED: CPT

## 2024-06-27 PROCEDURE — 36415 COLL VENOUS BLD VENIPUNCTURE: CPT

## 2024-06-27 PROCEDURE — 85025 COMPLETE CBC W/AUTO DIFF WBC: CPT

## 2024-06-27 PROCEDURE — 86140 C-REACTIVE PROTEIN: CPT

## 2024-06-27 PROCEDURE — 2580000003 HC RX 258: Performed by: EMERGENCY MEDICINE

## 2024-06-27 PROCEDURE — 6370000000 HC RX 637 (ALT 250 FOR IP): Performed by: INTERNAL MEDICINE

## 2024-06-27 PROCEDURE — 83605 ASSAY OF LACTIC ACID: CPT

## 2024-06-27 PROCEDURE — 73630 X-RAY EXAM OF FOOT: CPT

## 2024-06-27 PROCEDURE — 80053 COMPREHEN METABOLIC PANEL: CPT

## 2024-06-27 PROCEDURE — 6360000002 HC RX W HCPCS: Performed by: INTERNAL MEDICINE

## 2024-06-27 PROCEDURE — 6360000002 HC RX W HCPCS: Performed by: EMERGENCY MEDICINE

## 2024-06-27 PROCEDURE — 2500000003 HC RX 250 WO HCPCS: Performed by: INTERNAL MEDICINE

## 2024-06-27 PROCEDURE — 87040 BLOOD CULTURE FOR BACTERIA: CPT

## 2024-06-27 PROCEDURE — 99284 EMERGENCY DEPT VISIT MOD MDM: CPT

## 2024-06-27 RX ORDER — SULFAMETHOXAZOLE AND TRIMETHOPRIM 800; 160 MG/1; MG/1
1 TABLET ORAL 2 TIMES DAILY
Status: ON HOLD | COMMUNITY
Start: 2024-06-25 | End: 2024-07-02 | Stop reason: HOSPADM

## 2024-06-27 RX ORDER — METOPROLOL SUCCINATE 50 MG/1
50 TABLET, EXTENDED RELEASE ORAL 2 TIMES DAILY
Status: DISCONTINUED | OUTPATIENT
Start: 2024-06-27 | End: 2024-06-27

## 2024-06-27 RX ORDER — ATORVASTATIN CALCIUM 20 MG/1
20 TABLET, FILM COATED ORAL NIGHTLY
Status: DISCONTINUED | OUTPATIENT
Start: 2024-06-27 | End: 2024-07-11 | Stop reason: HOSPADM

## 2024-06-27 RX ORDER — ATORVASTATIN CALCIUM 20 MG/1
20 TABLET, FILM COATED ORAL DAILY
Status: DISCONTINUED | OUTPATIENT
Start: 2024-06-27 | End: 2024-06-27

## 2024-06-27 RX ORDER — METOPROLOL TARTRATE 50 MG/1
50 TABLET, FILM COATED ORAL DAILY
Status: DISCONTINUED | OUTPATIENT
Start: 2024-06-27 | End: 2024-06-27

## 2024-06-27 RX ORDER — METOPROLOL SUCCINATE 50 MG/1
100 TABLET, EXTENDED RELEASE ORAL 2 TIMES DAILY
Status: DISCONTINUED | OUTPATIENT
Start: 2024-06-27 | End: 2024-07-08

## 2024-06-27 RX ORDER — TAMSULOSIN HYDROCHLORIDE 0.4 MG/1
0.4 CAPSULE ORAL DAILY
COMMUNITY

## 2024-06-27 RX ORDER — POTASSIUM CHLORIDE 20 MEQ/1
40 TABLET, EXTENDED RELEASE ORAL EVERY 4 HOURS
Status: DISCONTINUED | OUTPATIENT
Start: 2024-06-27 | End: 2024-06-27

## 2024-06-27 RX ORDER — METOPROLOL SUCCINATE 100 MG/1
100 TABLET, EXTENDED RELEASE ORAL 2 TIMES DAILY
Status: ON HOLD | COMMUNITY
End: 2024-07-02 | Stop reason: HOSPADM

## 2024-06-27 RX ORDER — MAGNESIUM HYDROXIDE/ALUMINUM HYDROXICE/SIMETHICONE 120; 1200; 1200 MG/30ML; MG/30ML; MG/30ML
30 SUSPENSION ORAL EVERY 6 HOURS PRN
Status: DISCONTINUED | OUTPATIENT
Start: 2024-06-27 | End: 2024-07-11 | Stop reason: HOSPADM

## 2024-06-27 RX ORDER — METHIMAZOLE 5 MG/1
5 TABLET ORAL WEEKLY
COMMUNITY

## 2024-06-27 RX ORDER — SODIUM CHLORIDE 0.9 % (FLUSH) 0.9 %
5-40 SYRINGE (ML) INJECTION EVERY 12 HOURS SCHEDULED
Status: DISCONTINUED | OUTPATIENT
Start: 2024-06-27 | End: 2024-07-08

## 2024-06-27 RX ORDER — LANOLIN ALCOHOL/MO/W.PET/CERES
3 CREAM (GRAM) TOPICAL NIGHTLY
Status: DISCONTINUED | OUTPATIENT
Start: 2024-06-27 | End: 2024-07-11 | Stop reason: HOSPADM

## 2024-06-27 RX ORDER — SODIUM CHLORIDE 9 MG/ML
INJECTION, SOLUTION INTRAVENOUS PRN
Status: DISCONTINUED | OUTPATIENT
Start: 2024-06-27 | End: 2024-07-11 | Stop reason: HOSPADM

## 2024-06-27 RX ORDER — PREDNISONE 5 MG/1
10 TABLET ORAL DAILY
Status: COMPLETED | OUTPATIENT
Start: 2024-07-03 | End: 2024-07-05

## 2024-06-27 RX ORDER — ASPIRIN 81 MG/1
81 TABLET ORAL DAILY
COMMUNITY

## 2024-06-27 RX ORDER — ONDANSETRON 2 MG/ML
4 INJECTION INTRAMUSCULAR; INTRAVENOUS EVERY 6 HOURS PRN
Status: DISCONTINUED | OUTPATIENT
Start: 2024-06-27 | End: 2024-07-11 | Stop reason: HOSPADM

## 2024-06-27 RX ORDER — ACETAMINOPHEN 325 MG/1
650 TABLET ORAL EVERY 4 HOURS PRN
Status: DISCONTINUED | OUTPATIENT
Start: 2024-06-27 | End: 2024-06-27

## 2024-06-27 RX ORDER — TRAZODONE HYDROCHLORIDE 50 MG/1
50 TABLET ORAL NIGHTLY
COMMUNITY

## 2024-06-27 RX ORDER — ERGOCALCIFEROL 1.25 MG/1
50000 CAPSULE ORAL WEEKLY
COMMUNITY

## 2024-06-27 RX ORDER — POLYETHYLENE GLYCOL 3350 17 G/17G
17 POWDER, FOR SOLUTION ORAL DAILY PRN
Status: DISCONTINUED | OUTPATIENT
Start: 2024-06-27 | End: 2024-07-05

## 2024-06-27 RX ORDER — PREDNISONE 5 MG/1
5 TABLET ORAL DAILY
Status: DISPENSED | OUTPATIENT
Start: 2024-07-06 | End: 2024-07-09

## 2024-06-27 RX ORDER — FINASTERIDE 5 MG/1
5 TABLET, FILM COATED ORAL DAILY
Status: DISCONTINUED | OUTPATIENT
Start: 2024-06-27 | End: 2024-07-11 | Stop reason: HOSPADM

## 2024-06-27 RX ORDER — SODIUM CHLORIDE 0.9 % (FLUSH) 0.9 %
5-40 SYRINGE (ML) INJECTION PRN
Status: DISCONTINUED | OUTPATIENT
Start: 2024-06-27 | End: 2024-07-08

## 2024-06-27 RX ORDER — ONDANSETRON 4 MG/1
4 TABLET, ORALLY DISINTEGRATING ORAL EVERY 8 HOURS PRN
Status: DISCONTINUED | OUTPATIENT
Start: 2024-06-27 | End: 2024-07-11 | Stop reason: HOSPADM

## 2024-06-27 RX ORDER — ACETAMINOPHEN 650 MG/1
650 SUPPOSITORY RECTAL EVERY 6 HOURS PRN
Status: DISCONTINUED | OUTPATIENT
Start: 2024-06-27 | End: 2024-07-11 | Stop reason: HOSPADM

## 2024-06-27 RX ORDER — CEPHALEXIN 500 MG/1
500 CAPSULE ORAL 2 TIMES DAILY
Status: ON HOLD | COMMUNITY
Start: 2024-06-20 | End: 2024-07-02 | Stop reason: HOSPADM

## 2024-06-27 RX ORDER — LISINOPRIL 20 MG/1
20 TABLET ORAL DAILY
Status: DISCONTINUED | OUTPATIENT
Start: 2024-06-27 | End: 2024-07-08

## 2024-06-27 RX ORDER — ENOXAPARIN SODIUM 100 MG/ML
40 INJECTION SUBCUTANEOUS DAILY
Status: COMPLETED | OUTPATIENT
Start: 2024-06-28 | End: 2024-07-07

## 2024-06-27 RX ORDER — AMIODARONE HYDROCHLORIDE 200 MG/1
200 TABLET ORAL DAILY
Status: DISCONTINUED | OUTPATIENT
Start: 2024-06-27 | End: 2024-06-27

## 2024-06-27 RX ORDER — PREDNISONE 5 MG/1
15 TABLET ORAL DAILY
Status: COMPLETED | OUTPATIENT
Start: 2024-06-30 | End: 2024-07-02

## 2024-06-27 RX ORDER — PREDNISONE 20 MG/1
20 TABLET ORAL DAILY
Status: DISPENSED | OUTPATIENT
Start: 2024-06-27 | End: 2024-06-30

## 2024-06-27 RX ORDER — DILTIAZEM HYDROCHLORIDE 5 MG/ML
5 INJECTION INTRAVENOUS ONCE
Status: COMPLETED | OUTPATIENT
Start: 2024-06-27 | End: 2024-06-27

## 2024-06-27 RX ORDER — ACETAMINOPHEN 325 MG/1
650 TABLET ORAL EVERY 6 HOURS PRN
Status: DISCONTINUED | OUTPATIENT
Start: 2024-06-27 | End: 2024-07-11 | Stop reason: HOSPADM

## 2024-06-27 RX ADMIN — DILTIAZEM HYDROCHLORIDE 5 MG: 5 INJECTION, SOLUTION INTRAVENOUS at 18:45

## 2024-06-27 RX ADMIN — POTASSIUM BICARBONATE 40 MEQ: 782 TABLET, EFFERVESCENT ORAL at 23:22

## 2024-06-27 RX ADMIN — SODIUM CHLORIDE 25 ML: 9 INJECTION, SOLUTION INTRAVENOUS at 15:51

## 2024-06-27 RX ADMIN — METOPROLOL SUCCINATE 100 MG: 50 TABLET, FILM COATED, EXTENDED RELEASE ORAL at 21:12

## 2024-06-27 RX ADMIN — ATORVASTATIN CALCIUM 20 MG: 20 TABLET, FILM COATED ORAL at 21:12

## 2024-06-27 RX ADMIN — MELATONIN 3 MG: at 21:12

## 2024-06-27 RX ADMIN — POTASSIUM BICARBONATE 40 MEQ: 782 TABLET, EFFERVESCENT ORAL at 18:45

## 2024-06-27 RX ADMIN — SODIUM CHLORIDE, PRESERVATIVE FREE 10 ML: 5 INJECTION INTRAVENOUS at 21:13

## 2024-06-27 RX ADMIN — VANCOMYCIN HYDROCHLORIDE 1750 MG: 10 INJECTION, POWDER, LYOPHILIZED, FOR SOLUTION INTRAVENOUS at 16:28

## 2024-06-27 RX ADMIN — CEFEPIME 2000 MG: 2 INJECTION, POWDER, FOR SOLUTION INTRAVENOUS at 15:54

## 2024-06-27 NOTE — PROGRESS NOTES
Pharmacy Antimicrobial Kinetic Dosing    Indication for Antimicrobials: bone/joint infection     Current Regimen of Each Antimicrobial:  Vancomycin pharm to dose; Start Date ; Day # 1  Cefepime 2g q 12h (start: , day 1)    Previous Antimicrobial Therapy:    Goal Level: Vancomycin -600    Date Dose & Interval Measured (mcg/mL) Predicted AUC                       Significant Cultures:   :blood: ngtd    Labs:  Recent Labs     Units 24  1313   CREATININE MG/DL 1.11   BUN MG/DL 22*   PROCAL ng/mL <0.05   WBC K/uL 6.5     Temp (24hrs), Av.8 °F (36.6 °C), Min:97.8 °F (36.6 °C), Max:97.8 °F (36.6 °C)    Conditions for Dosing Consideration: None    Creatinine Clearance (mL/min): Estimated Creatinine Clearance: 38 mL/min (based on SCr of 1.11 mg/dL).       Impression/Plan:   Vancomycin 1750mg IV load, then 1000mg q 24h for auc 553  Cefepime as above for renal function/indication  Cmp and cbc for   Antimicrobial stop date to be determined     Pharmacy will follow daily and adjust medications as appropriate for renal function and/or serum levels.    Thank you,  Zia Stewart RPH

## 2024-06-27 NOTE — ED PROVIDER NOTES
Status: He is alert and oriented to person, place, and time.      Cranial Nerves: No cranial nerve deficit.   Psychiatric:         Mood and Affect: Mood normal.         Behavior: Behavior normal.          DIAGNOSTIC RESULTS   LABS:     Recent Results (from the past 24 hour(s))   CBC with Auto Differential    Collection Time: 06/27/24  1:13 PM   Result Value Ref Range    WBC 6.5 4.1 - 11.1 K/uL    RBC 4.25 4.10 - 5.70 M/uL    Hemoglobin 13.5 12.1 - 17.0 g/dL    Hematocrit 41.0 36.6 - 50.3 %    MCV 96.5 80.0 - 99.0 FL    MCH 31.8 26.0 - 34.0 PG    MCHC 32.9 30.0 - 36.5 g/dL    RDW 13.3 11.5 - 14.5 %    Platelets 120 (L) 150 - 400 K/uL    MPV 12.3 8.9 - 12.9 FL    Nucleated RBCs 0.0 0  WBC    nRBC 0.00 0.00 - 0.01 K/uL    Neutrophils % 63 32 - 75 %    Lymphocytes % 21 12 - 49 %    Monocytes % 12 5 - 13 %    Eosinophils % 2 0 - 7 %    Basophils % 1 0 - 1 %    Immature Granulocytes % 1 (H) 0.0 - 0.5 %    Neutrophils Absolute 4.2 1.8 - 8.0 K/UL    Lymphocytes Absolute 1.4 0.8 - 3.5 K/UL    Monocytes Absolute 0.8 0.0 - 1.0 K/UL    Eosinophils Absolute 0.1 0.0 - 0.4 K/UL    Basophils Absolute 0.0 0.0 - 0.1 K/UL    Immature Granulocytes Absolute 0.0 0.00 - 0.04 K/UL    Differential Type AUTOMATED     Comprehensive Metabolic Panel    Collection Time: 06/27/24  1:13 PM   Result Value Ref Range    Sodium 141 136 - 145 mmol/L    Potassium 3.2 (L) 3.5 - 5.1 mmol/L    Chloride 108 97 - 108 mmol/L    CO2 24 21 - 32 mmol/L    Anion Gap 9 5 - 15 mmol/L    Glucose 107 (H) 65 - 100 mg/dL    BUN 22 (H) 6 - 20 MG/DL    Creatinine 1.11 0.70 - 1.30 MG/DL    BUN/Creatinine Ratio 20 12 - 20      Est, Glom Filt Rate 62 >60 ml/min/1.73m2    Calcium 9.0 8.5 - 10.1 MG/DL    Total Bilirubin 1.2 (H) 0.2 - 1.0 MG/DL    ALT 21 12 - 78 U/L    AST 27 15 - 37 U/L    Alk Phosphatase 84 45 - 117 U/L    Total Protein 7.0 6.4 - 8.2 g/dL    Albumin 3.3 (L) 3.5 - 5.0 g/dL    Globulin 3.7 2.0 - 4.0 g/dL    Albumin/Globulin Ratio 0.9 (L) 1.1 - 2.2    ordered.  Radiology: ordered.    Risk  Decision regarding hospitalization.          Patient presents to the emergency department for evaluation of right foot pain.  Patient states onset of symptoms began approximately 10 days ago.  He states he was seen by his primary care physician and been started on an antibiotic.  Patient states he was seen again 2 days ago and antibiotic was added.  Patient currently taking Bactrim and Keflex.  He was seen by primary care physician today and due to no improvement patient was referred to the emergency department for further evaluation and IV antibiotics.  Patient states he does not have any pain in the foot so long as he is not walking on it.  Patient states pain is moderate in severity with ambulation.  He denies any fever or chills.  Denies any chest pain or shortness of breath.  Patient does have a history of chronic A-fib currently has watchman in place.  He states he had an ultrasound duplex done 1 week ago that was normal.      Concern for cellulitis not responding to p.o. antibiotics, other differentials include osteomyelitis.  Will obtain blood work to include CBC CMP blood cultures POC lactate procalcitonin ESR CRP.  Patiently started on IV antibiotics.  Will obtain plain film imaging of the foot to evaluate for any significant bony abnormality on x-ray.  Patient will likely need to be admitted for subsequent MRI.    Patient white count not significantly elevated.  CMP potassium 3.2.  Sed rate slightly elevated 23 CRP 4.72.  Procalcitonin less than 0.05.  X-ray of the right foot demonstrate soft tissue swelling as well as possible subtle bony erosion at the base of the first digit concerning for osteomyelitis.  Will consult hospitalist for admission.  IV vancomycin and cefepime been ordered.    Consult note:  Case discussed with hospitalist.  Patient will be evaluated admitted.      FINAL IMPRESSION     1. Diabetic foot ulcer with osteomyelitis (HCC)    2. Osteomyelitis

## 2024-06-27 NOTE — H&P
Hospitalist Admission Note    NAME:   Og Aleman   : 1931   MRN: [id number]     Date/Time: 2024 2:20 PM    Patient PCP: Herman Ortiz MD    ______________________________________________________________________  Given the patient's current clinical presentation, I have a high level of concern for decompensation if discharged from the emergency department.  Complex decision making was performed, which includes reviewing the patient's available past medical records, laboratory results, and x-ray films.       My assessment of this patient's clinical condition and my plan of care is as follows.    Assessment / Plan:  RLE cellulitis  Possible right first digit proximal phalanx osteomyelitis  Failed outpatient treatment with oral Bactrim and Keflex  Continue with cefepime and IV vancomycin.  Pharmacy consult for vancomycin dosing.  MRI right foot pending.  Consult podiatry for further input.  Will also consult wound care nurse.    Hypokalemia  Potassium of 3.0 and will give 80 KCl.  Will check for magnesium level and replete and history.    Atrial fibrillation s/p Watchman device  Patient unsure about taking amiodarone.  Will consult pharmacy to verify that.  Continue with metoprolol for rate control.    HTN  HLD  Stroke  Continue with Lipitor, lisinopril.    BPH  Continue with finasteride.        Medical Decision Making:   I personally reviewed labs: CBC, BMP, LFT, ESR, lactic acid  I personally reviewed imaging:Chest x-ray, x-ray of right foot  I personally reviewed EKG:  Toxic drug monitoring:   H&H while patient is on Lovenox, kidney function while patient is on IV vancomycin  Discussed case with: ED provider. After discussion I am in agreement that acuity of patient's medical condition necessitates hospital stay.  Daughter at the bedside.      Code Status: Full code  DVT Prophylaxis: Lovenox  Baseline: Independent from ivette Smith, son Anibal is the DPOA.    Subjective:   CHIEF COMPLAINT: Right  status: Former     Current packs/day: 0.00     Types: Cigarettes     Quit date: 1995     Years since quittin.5    Smokeless tobacco: Never   Substance Use Topics    Alcohol use: Yes     Alcohol/week: 1.0 standard drink of alcohol        No family history on file.    No Known Allergies     Prior to Admission medications    Medication Sig Start Date End Date Taking? Authorizing Provider   amiodarone (CORDARONE) 200 MG tablet Take 1 tablet by mouth    Automatic Reconciliation, Ar   atorvastatin (LIPITOR) 20 MG tablet Take 1 tablet by mouth daily    Automatic Reconciliation, Ar   finasteride (PROSCAR) 5 MG tablet Take 1 tablet by mouth daily    Automatic Reconciliation, Ar   furosemide (LASIX) 20 MG tablet Take by mouth daily    Automatic Reconciliation, Ar   lisinopril (PRINIVIL;ZESTRIL) 20 MG tablet Take by mouth daily    Automatic Reconciliation, Ar   metoprolol tartrate (LOPRESSOR) 50 MG tablet Take by mouth daily    Automatic Reconciliation, Ar         Objective:   VITALS:    Patient Vitals for the past 24 hrs:   BP Temp Temp src Pulse Resp SpO2 Height Weight   24 1215 129/78 97.8 °F (36.6 °C) Oral (!) 111 16 96 % 1.676 m (5' 6\") 74.8 kg (165 lb)       Temp (24hrs), Av.8 °F (36.6 °C), Min:97.8 °F (36.6 °C), Max:97.8 °F (36.6 °C)        O2 Device: None (Room air)    Wt Readings from Last 12 Encounters:   24 74.8 kg (165 lb)         PHYSICAL EXAM:  General:    Alert, cooperative, appears stated age.     RLE:  Redness, swelling and tenderness of RLE particularly in the right foot near the first digit, yellow discharge on the solar aspect  Lungs:   CTA b/l.  No wheezing or Rhonchi. No rales.  Chest wall:  No tenderness.  No accessory muscle use.  Heart:   Tachycardic, regular  rhythm,  No  Murmur.  2+ edema of bilateral lower extremity more so on the RLE  Abdomen:   Soft, NT. ND  BS+  Extremities: No cyanosis.  No clubbing,      Skin turgor normal, Radial dial pulse 2+. Capillary refill

## 2024-06-27 NOTE — PROGRESS NOTES
8906  Patient arrived for right foot pain for 10 days.  Has been on oral antibiotics for 1 week.  Not getting better.  Here with family.

## 2024-06-27 NOTE — PROGRESS NOTES
Foot and Ankle Progress Note    Admit Date: 2024  Hospital day 1    Subjective:     Patient was admitted thru the ER secondary to a painful rigth foot of several days.    No Known Allergies     History:     No family history on file.   Past Medical History:   Diagnosis Date    Arrhythmia 2017    a-fib    Hypertension     Stroke (HCC)       Social History     Substance and Sexual Activity   Alcohol Use Yes    Alcohol/week: 1.0 standard drink of alcohol      Social History     Substance and Sexual Activity   Drug Use Never      Past Surgical History:   Procedure Laterality Date    OTHER SURGICAL HISTORY  2021    Watchman placement    PACEMAKER      UROLOGICAL  2016    bladder stone removal      Social History     Tobacco Use   Smoking Status Former    Current packs/day: 0.00    Types: Cigarettes    Quit date: 1995    Years since quittin.5   Smokeless Tobacco Never        Current Facility-Administered Medications   Medication Dose Route Frequency    finasteride (PROSCAR) tablet 5 mg  5 mg Oral Daily    lisinopril (PRINIVIL;ZESTRIL) tablet 20 mg  20 mg Oral Daily    sodium chloride flush 0.9 % injection 5-40 mL  5-40 mL IntraVENous 2 times per day    sodium chloride flush 0.9 % injection 5-40 mL  5-40 mL IntraVENous PRN    0.9 % sodium chloride infusion   IntraVENous PRN    [START ON 2024] enoxaparin (LOVENOX) injection 40 mg  40 mg SubCUTAneous Daily    ondansetron (ZOFRAN-ODT) disintegrating tablet 4 mg  4 mg Oral Q8H PRN    Or    ondansetron (ZOFRAN) injection 4 mg  4 mg IntraVENous Q6H PRN    polyethylene glycol (GLYCOLAX) packet 17 g  17 g Oral Daily PRN    acetaminophen (TYLENOL) tablet 650 mg  650 mg Oral Q6H PRN    Or    acetaminophen (TYLENOL) suppository 650 mg  650 mg Rectal Q6H PRN    melatonin tablet 3 mg  3 mg Oral Nightly    aluminum & magnesium hydroxide-simethicone (MAALOX) 200-200-20 MG/5ML suspension 30 mL  30 mL Oral Q6H PRN    [START ON 2024] ceFEPIme (MAXIPIME) 2,000  mg in sodium chloride 0.9 % 100 mL IVPB (mini-bag)  2,000 mg IntraVENous Q12H    [START ON 6/28/2024] vancomycin (VANCOCIN) 1,000 mg in sodium chloride 0.9 % 250 mL IVPB (Qwye2Pfq)  1,000 mg IntraVENous Q24H    atorvastatin (LIPITOR) tablet 20 mg  20 mg Oral Nightly    metoprolol succinate (TOPROL XL) extended release tablet 100 mg  100 mg Oral BID    dilTIAZem injection 5 mg  5 mg IntraVENous Once    potassium bicarb-citric acid (EFFER-K) effervescent tablet 40 mEq  40 mEq Oral Q4H        Review of Systems  A comprehensive review of systems was negative    H&P consistent with admitting H&P  Objective:     Patient Vitals for the past 8 hrs:   BP Temp Temp src Pulse Resp SpO2 Height Weight   06/27/24 1815 (!) 154/91 97.7 °F (36.5 °C) Oral (!) 103 16 99 % -- --   06/27/24 1215 129/78 97.8 °F (36.6 °C) Oral (!) 111 16 96 % 1.676 m (5' 6\") 74.8 kg (165 lb)     06/27 0701 - 06/27 1900  In: 100   Out: -   No intake/output data recorded.      PHYSICAL EXAM LOWER LEGS:  Non palpable pedal pulses with epicritic sensation intact  Right midfoot is red, warm  and painful to the touch   There are no open wounds right foot nor toes    WBC: 6.5    Afebrile    Xray right foot:  Vascular calcifications    Bony erosion medial and lateral base 1st proximal phalanx that is more than likely secondary to DJD vs gout as opposed to osteomyelitis                Assessment:     Principal Problem:    Diabetic foot ulcer with osteomyelitis (HCC)  Active Problems:    Acute osteomyelitis of right foot (HCC)  Resolved Problems:    * No resolved hospital problems. *    Cellulitis right foot   PAD  Plan:   Discuss differential diagnosis with patient and family of cellulitis ; but more than likely gout.  Ordered medrol dose pac   Ordered GUILLERMINA   Uric acid study ordered  I will keep close watch for MRI results to determine if there is any drainable  abscess     Face to face 45 minutes

## 2024-06-27 NOTE — PROGRESS NOTES
..End of Shift Note    Bedside shift change report given to BECKIE Fox (oncoming nurse) by Ted Brewer RN (offgoing nurse).  Report included the following information SBAR    Shift worked:  0700 - 1900     Shift summary and any significant changes:    Pt. Had a hard time swallowing pills.  Oral potassium moved to EFFER-K.  Family at bedside during evening.  Podiatry at bedside with patient during evening.     Concerns for physician to address: No     Zone phone for oncoming shift:   No       Activity:     Number times ambulated in hallways past shift: 0  Number of times OOB to chair past shift: 0    Cardiac:   Cardiac Monitoring: Yes           Access:  Current line(s): PIV     Genitourinary:   Urinary status: voiding    Respiratory:      Chronic home O2 use?: NO  Incentive spirometer at bedside: NO       GI:     Current diet:  ADULT DIET; Regular; Low Fat/Low Chol/High Fiber/2 gm Na  ADULT ORAL NUTRITION SUPPLEMENT; Breakfast, Lunch, Dinner; Standard High Calorie/High Protein Oral Supplement  Passing flatus: YES  Tolerating current diet: YES       Pain Management:   Patient states pain is manageable on current regimen: YES    Skin:     Interventions: float heels and increase time out of bed    Patient Safety:  Fall Score:    Interventions: bed/chair alarm, gripper socks, and pt to call before getting OOB       Length of Stay:  Expected LOS: 3  Actual LOS: 0      Ted Brewer RN

## 2024-06-27 NOTE — PROGRESS NOTES
Admission Medication History Technician Note:    Hemodialysis patient: None    Patient preferred pharmacy Confirmed:    [id number] - [place], [place] - [address] - P [phone] - F [phone]  [address] [address]  Phone: [phone] Fax: [phone]      Information obtained from¹: Patient, Rx Query, and Medication List    Does patient manage their medications: no    Comments/Recommendations: Updated PTA meds/reviewed patient's allergies.    1)  Medication issues identified: patient was sent from [facility]    2)  Medication changes (since last review):  Added  + Metoprolol suc  + Bactrim  + Cephalexin  + Aspirin  + Vit D2  + Tamsulosin  + Trazodone    Removed  - Amiodarone  - Metoprolol tart      Adjusted  Lisinoprin 20mg: Old: take daily -->New: take 1/2 tablet daily      3)  Pertinent Pharmacy Findings:  Identified High Alert Medication Information  Current Anticoagulants Aspirin     ¹[facility] pharmacy benefit data reflects medications filled and processed through the patient's insurance, however this data does NOT capture whether the medication was picked up or is currently being taken by the patient.    Allergies:  Patient has no known allergies.    Chief Complaint for this Admission:    Chief Complaint   Patient presents with    Foot Pain     Patient having right sided foot pain for 10 days.  Went to doctor and placed on oral antibiotics for right foot infection.  Here for increased pain and redness to right foot. Purulent drainage noted to right foot.     Prior to Admission Medications:   Current Outpatient Medications   Medication Instructions    aspirin 81 mg, Oral, DAILY    atorvastatin (LIPITOR) 20 mg, Oral, Nightly    cephALEXin (KEFLEX) 500 mg, Oral, 2 TIMES DAILY, For 10 days    finasteride (PROSCAR) 5 mg, Oral, DAILY    furosemide (LASIX) 20 mg, Oral, DAILY    lisinopril (PRINIVIL;ZESTRIL) 10 mg, Oral, DAILY    methIMAzole (TAPAZOLE) 5  mg, Oral, WEEKLY, Sunday     metoprolol succinate (TOPROL XL) 100 mg, Oral, 2 times daily    sulfamethoxazole-trimethoprim (BACTRIM DS;SEPTRA DS) 800-160 MG per tablet 1 tablet, Oral, 2 TIMES DAILY, For 7 days<BR>    tamsulosin (FLOMAX) 0.4 mg, Oral, DAILY    traZODone (DESYREL) 50 mg, Oral, NIGHTLY    vitamin D (ERGOCALCIFEROL) 50,000 Units, Oral, WEEKLY, Sunday<BR>       Thank you,  Ilene DozierCoshoct[facility] Regional [facility] Center  Medication History Pharmacy Technician

## 2024-06-28 ENCOUNTER — APPOINTMENT (OUTPATIENT)
Facility: HOSPITAL | Age: 89
End: 2024-06-28
Attending: PODIATRIST
Payer: MEDICARE

## 2024-06-28 ENCOUNTER — APPOINTMENT (OUTPATIENT)
Facility: HOSPITAL | Age: 89
End: 2024-06-28
Payer: MEDICARE

## 2024-06-28 LAB
ALBUMIN SERPL-MCNC: 2.9 G/DL (ref 3.5–5)
ALBUMIN/GLOB SERPL: 0.7 (ref 1.1–2.2)
ALP SERPL-CCNC: 79 U/L (ref 45–117)
ALT SERPL-CCNC: 21 U/L (ref 12–78)
ANION GAP SERPL CALC-SCNC: 6 MMOL/L (ref 5–15)
AST SERPL-CCNC: 28 U/L (ref 15–37)
BILIRUB SERPL-MCNC: 1.6 MG/DL (ref 0.2–1)
BUN SERPL-MCNC: 19 MG/DL (ref 6–20)
BUN/CREAT SERPL: 19 (ref 12–20)
CALCIUM SERPL-MCNC: 8.9 MG/DL (ref 8.5–10.1)
CHLORIDE SERPL-SCNC: 110 MMOL/L (ref 97–108)
CO2 SERPL-SCNC: 22 MMOL/L (ref 21–32)
CREAT SERPL-MCNC: 0.99 MG/DL (ref 0.7–1.3)
ERYTHROCYTE [DISTWIDTH] IN BLOOD BY AUTOMATED COUNT: 13.3 % (ref 11.5–14.5)
GLOBULIN SER CALC-MCNC: 3.9 G/DL (ref 2–4)
GLUCOSE SERPL-MCNC: 108 MG/DL (ref 65–100)
HCT VFR BLD AUTO: 43.1 % (ref 36.6–50.3)
HGB BLD-MCNC: 13.8 G/DL (ref 12.1–17)
LACTATE SERPL-SCNC: 1.7 MMOL/L (ref 0.4–2)
MAGNESIUM SERPL-MCNC: 2 MG/DL (ref 1.6–2.4)
MCH RBC QN AUTO: 31.7 PG (ref 26–34)
MCHC RBC AUTO-ENTMCNC: 32 G/DL (ref 30–36.5)
MCV RBC AUTO: 99.1 FL (ref 80–99)
NRBC # BLD: 0 K/UL (ref 0–0.01)
NRBC BLD-RTO: 0 PER 100 WBC
PHOSPHATE SERPL-MCNC: 2.2 MG/DL (ref 2.6–4.7)
PLATELET # BLD AUTO: 118 K/UL (ref 150–400)
PMV BLD AUTO: 12.7 FL (ref 8.9–12.9)
POTASSIUM SERPL-SCNC: 4.5 MMOL/L (ref 3.5–5.1)
PROT SERPL-MCNC: 6.8 G/DL (ref 6.4–8.2)
RBC # BLD AUTO: 4.35 M/UL (ref 4.1–5.7)
SODIUM SERPL-SCNC: 138 MMOL/L (ref 136–145)
URATE SERPL-MCNC: 5.8 MG/DL (ref 3.5–7.2)
WBC # BLD AUTO: 7.6 K/UL (ref 4.1–11.1)

## 2024-06-28 PROCEDURE — 6370000000 HC RX 637 (ALT 250 FOR IP): Performed by: INTERNAL MEDICINE

## 2024-06-28 PROCEDURE — 97161 PT EVAL LOW COMPLEX 20 MIN: CPT

## 2024-06-28 PROCEDURE — 97116 GAIT TRAINING THERAPY: CPT

## 2024-06-28 PROCEDURE — 85027 COMPLETE CBC AUTOMATED: CPT

## 2024-06-28 PROCEDURE — 6360000002 HC RX W HCPCS: Performed by: INTERNAL MEDICINE

## 2024-06-28 PROCEDURE — 97535 SELF CARE MNGMENT TRAINING: CPT | Performed by: OCCUPATIONAL THERAPIST

## 2024-06-28 PROCEDURE — 83735 ASSAY OF MAGNESIUM: CPT

## 2024-06-28 PROCEDURE — 83605 ASSAY OF LACTIC ACID: CPT

## 2024-06-28 PROCEDURE — 84100 ASSAY OF PHOSPHORUS: CPT

## 2024-06-28 PROCEDURE — 36415 COLL VENOUS BLD VENIPUNCTURE: CPT

## 2024-06-28 PROCEDURE — 2580000003 HC RX 258: Performed by: INTERNAL MEDICINE

## 2024-06-28 PROCEDURE — 93922 UPR/L XTREMITY ART 2 LEVELS: CPT

## 2024-06-28 PROCEDURE — 6370000000 HC RX 637 (ALT 250 FOR IP): Performed by: PODIATRIST

## 2024-06-28 PROCEDURE — 84550 ASSAY OF BLOOD/URIC ACID: CPT

## 2024-06-28 PROCEDURE — 1100000003 HC PRIVATE W/ TELEMETRY

## 2024-06-28 PROCEDURE — 2500000003 HC RX 250 WO HCPCS: Performed by: INTERNAL MEDICINE

## 2024-06-28 PROCEDURE — 97165 OT EVAL LOW COMPLEX 30 MIN: CPT | Performed by: OCCUPATIONAL THERAPIST

## 2024-06-28 PROCEDURE — 80053 COMPREHEN METABOLIC PANEL: CPT

## 2024-06-28 PROCEDURE — 73718 MRI LOWER EXTREMITY W/O DYE: CPT

## 2024-06-28 RX ORDER — ERGOCALCIFEROL 1.25 MG/1
50000 CAPSULE ORAL WEEKLY
Status: DISCONTINUED | OUTPATIENT
Start: 2024-06-30 | End: 2024-07-11 | Stop reason: HOSPADM

## 2024-06-28 RX ORDER — TRAZODONE HYDROCHLORIDE 50 MG/1
50 TABLET ORAL NIGHTLY
Status: DISCONTINUED | OUTPATIENT
Start: 2024-06-28 | End: 2024-07-11 | Stop reason: HOSPADM

## 2024-06-28 RX ORDER — ASPIRIN 81 MG/1
81 TABLET ORAL DAILY
Status: DISCONTINUED | OUTPATIENT
Start: 2024-06-28 | End: 2024-07-11 | Stop reason: HOSPADM

## 2024-06-28 RX ORDER — TAMSULOSIN HYDROCHLORIDE 0.4 MG/1
0.4 CAPSULE ORAL DAILY
Status: DISCONTINUED | OUTPATIENT
Start: 2024-06-28 | End: 2024-07-11 | Stop reason: HOSPADM

## 2024-06-28 RX ORDER — DILTIAZEM HYDROCHLORIDE 60 MG/1
60 CAPSULE, EXTENDED RELEASE ORAL 2 TIMES DAILY
Status: DISCONTINUED | OUTPATIENT
Start: 2024-06-28 | End: 2024-07-02

## 2024-06-28 RX ORDER — METHIMAZOLE 5 MG/1
5 TABLET ORAL WEEKLY
Status: DISCONTINUED | OUTPATIENT
Start: 2024-06-30 | End: 2024-07-11 | Stop reason: HOSPADM

## 2024-06-28 RX ORDER — DILTIAZEM HYDROCHLORIDE 5 MG/ML
5 INJECTION INTRAVENOUS EVERY 4 HOURS PRN
Status: DISCONTINUED | OUTPATIENT
Start: 2024-06-28 | End: 2024-07-11 | Stop reason: HOSPADM

## 2024-06-28 RX ORDER — AMMONIUM LACTATE 12 G/100G
LOTION TOPICAL 2 TIMES DAILY
Status: DISCONTINUED | OUTPATIENT
Start: 2024-06-28 | End: 2024-07-11 | Stop reason: HOSPADM

## 2024-06-28 RX ADMIN — LISINOPRIL 20 MG: 20 TABLET ORAL at 10:04

## 2024-06-28 RX ADMIN — DILTIAZEM HYDROCHLORIDE 60 MG: 60 CAPSULE, EXTENDED RELEASE ORAL at 18:33

## 2024-06-28 RX ADMIN — PREDNISONE 20 MG: 20 TABLET ORAL at 10:04

## 2024-06-28 RX ADMIN — CEFEPIME 2000 MG: 2 INJECTION, POWDER, FOR SOLUTION INTRAVENOUS at 10:20

## 2024-06-28 RX ADMIN — SODIUM CHLORIDE, PRESERVATIVE FREE 10 ML: 5 INJECTION INTRAVENOUS at 21:15

## 2024-06-28 RX ADMIN — ENOXAPARIN SODIUM 40 MG: 100 INJECTION SUBCUTANEOUS at 10:04

## 2024-06-28 RX ADMIN — METOPROLOL SUCCINATE 100 MG: 50 TABLET, FILM COATED, EXTENDED RELEASE ORAL at 10:03

## 2024-06-28 RX ADMIN — SODIUM CHLORIDE, PRESERVATIVE FREE 10 ML: 5 INJECTION INTRAVENOUS at 10:05

## 2024-06-28 RX ADMIN — DILTIAZEM HYDROCHLORIDE 5 MG: 5 INJECTION, SOLUTION INTRAVENOUS at 18:53

## 2024-06-28 RX ADMIN — VANCOMYCIN HYDROCHLORIDE 1000 MG: 1 INJECTION, POWDER, LYOPHILIZED, FOR SOLUTION INTRAVENOUS at 15:35

## 2024-06-28 RX ADMIN — TRAZODONE HYDROCHLORIDE 50 MG: 50 TABLET ORAL at 21:18

## 2024-06-28 RX ADMIN — CEFEPIME 2000 MG: 2 INJECTION, POWDER, FOR SOLUTION INTRAVENOUS at 21:17

## 2024-06-28 RX ADMIN — MELATONIN 3 MG: at 21:18

## 2024-06-28 RX ADMIN — ASPIRIN 81 MG: 81 TABLET, COATED ORAL at 18:33

## 2024-06-28 RX ADMIN — ATORVASTATIN CALCIUM 20 MG: 20 TABLET, FILM COATED ORAL at 21:18

## 2024-06-28 RX ADMIN — Medication: at 16:51

## 2024-06-28 RX ADMIN — TAMSULOSIN HYDROCHLORIDE 0.4 MG: 0.4 CAPSULE ORAL at 18:33

## 2024-06-28 RX ADMIN — METOPROLOL SUCCINATE 100 MG: 50 TABLET, FILM COATED, EXTENDED RELEASE ORAL at 21:18

## 2024-06-28 RX ADMIN — FINASTERIDE 5 MG: 5 TABLET, FILM COATED ORAL at 10:04

## 2024-06-28 NOTE — PROGRESS NOTES
Chart reviewed for PCP hospital follow up. Patient's JACKELYN is currently 7/1/24. CMA can schedule the appt when patient is clinically ready to discharge. [facility] placed [facility] information AVS for patient resource. Pending patient discharge.

## 2024-06-28 NOTE — WOUND CARE
Wound care consulted for the right foot wounds / condition that was present on admission.  Dr. Ojeda has also seen the patient and ordered tests, etc but no wound care orders, so we were also consulted.   The right foot was cleansed with CHG soap and soaked for several minutes before rinsing the skin and then cleansing the dried drainage from the plantar surface. Pt. Has a small open fissure in the plantar aspect of the great toe.   The skin / distal aspect of the right foot is indurated and very painful with all of the foot care today. The skin appears red and inflamed.  The skin in between the toes was cleansed as well and no open wounds noted.    After cleansing the right foot it was dressed with Xeroform gauze just to keep the fissure moist to heal better and then covered with a large ABD and wrapped with kraig and then his slipper socks were applied. Pt. Is ambulatory to and from the bathroom with just supervision from staff to prevent falls.   Plan: Temporary wound care orders were written until other orders are written by Dr. Ojeda.  This was discussed with KARTIK Burnham.      Right foot prior to cleansing the skin.       Fissure behind the right great toe: pink and  Painful, \"raw\":      Pt. Fell a few days ago and skinned both knees raw. There are some signs of healing with epithelial skin.      The knees were cleansed with NS and wiped with gauze. Dressed with Xeroform gauze and covered with foam dressing (1/2 dressing on each knee). Date for today.       Pt. Has a very dry left foot as well.  He stated that he uses a \"salve\" on it every day at home but does not know what it is called; \"its for the dry skin.  I asked if it was Lac-Hydrin and he \"thinks so\".       Wendy Forrester RN, BSN, CWON

## 2024-06-28 NOTE — PROGRESS NOTES
1628  Vancomycin started as ordered.    Transfer report called to Ted RN.  Patient to go to inpatient unit with transport and tele box.

## 2024-06-28 NOTE — PLAN OF CARE
Problem: Physical Therapy - Adult  Goal: By Discharge: Performs mobility at highest level of function for planned discharge setting.  See evaluation for individualized goals.  Description: FUNCTIONAL STATUS PRIOR TO ADMISSION: Patient reports having SPC and rollator. Sounds like it depends on the day or what he is doing for which device he uses.     HOME SUPPORT PRIOR TO ADMISSION: The patient lives in an independent living apartment at [facility].    Physical Therapy Goals  Initiated 6/28/2024  1.  Patient will move from supine to sit and sit to supine in bed with modified independence within 7 day(s).    2.  Patient will perform sit to stand with modified independence within 7 day(s).  3.  Patient will transfer from bed to chair and chair to bed with modified independence using the least restrictive device within 7 day(s).  4.  Patient will ambulate with modified independence for 300 feet with the least restrictive device within 7 day(s).     Outcome: Progressing     PHYSICAL THERAPY EVALUATION    Patient: Og Aleman (89 y.o. male)  Date: 6/28/2024  Primary Diagnosis: Diabetic foot ulcer with osteomyelitis ([facility]) [E11.621, E11.69, L97.509, M86.9]  Acute osteomyelitis of right foot ([facility]) [M86.171]       Precautions: Restrictions/Precautions: Fall Risk                      ASSESSMENT :   DEFICITS/IMPAIRMENTS:   The patient is limited by decreased functional mobility, balance, increased pain levels s/p admission for diabetic foot ulcer. Received patient in the bathroom. He performed transfers and ambulation using RW with standby assist. Gait steady with no LOB noted. Patient reported minimal pain in R foot during mobility today. At end of session patient was left sitting up in chair with call bell within reach, no complaints.    Based on the impairments listed above the patient is below his prior level of function. He normally lives on the independent side of [facility]. He stays he ambulates with  distress sitting up in chair and Call bell within reach    COMMUNICATION/EDUCATION:   The patient's plan of care was discussed with: registered nurse    Patient Education  Education Given To: Patient  Education Provided: Role of Therapy;Plan of Care;Fall Prevention Strategies  Education Method: Verbal  Barriers to Learning: None  Education Outcome: Verbalized understanding    Thank you for this referral.  Lissett Bhatti, PT  Minutes: 20      Physical Therapy Evaluation Charge Determination   History Examination Presentation Decision-Making   MEDIUM  Complexity : 1-2 comorbidities / personal factors will impact the outcome/ POC  LOW Complexity : 1-2 Standardized tests and measures addressing body structure, function, activity limitation and / or participation in recreation  LOW Complexity : Stable, uncomplicated  Tinetti Gait and Balance  HIGH    Based on the above components, the patient evaluation is determined to be of the following complexity level: Medium

## 2024-06-28 NOTE — PROGRESS NOTES
OCCUPATIONAL THERAPY EVALUATION/DISCHARGE  Patient: Og Aleman (89 y.o. male)  Date: 6/28/2024  Primary Diagnosis: Diabetic foot ulcer with osteomyelitis (HCC) [E11.621, E11.69, L97.509, M86.9]  Acute osteomyelitis of right foot (HCC) [M86.171]         Precautions: Fall Risk     ASSESSMENT :  Based on the objective data below, the patient was in bathroom upon arrival ringing for assist.  Pt needs supervision to SBA for mobility with RW.  Pt was able to manage toileting needs, grooming at sink with Modified independence.  Pt does report that he sits in a lift chair and uses the chair to assist with standing.  Pt needs increased time to achieve sit to stand but is able to perform from a standard surface with supervision/SBA.      Functional Outcome Measure:  The patient scored 80/100 on the barthel outcome measure which is indicative of independence in ADLS.      Further skilled acute occupational therapy is not indicated at this time.     PLAN :  Recommend with staff: ambulate 3x a day in halls with RW, out of bed to chair most of the day, allow pt to participate in his ADLS    Recommendation for discharge: (in order for the patient to meet his/her long term goals): OP PT for higher level balance, sit to stand practice and general strengthening    Other factors to consider for discharge:  needs higher level balance training and general strengthening, PT is following pt for services in the acute care setting to address this    IF patient discharges home will need the following DME:  shower chair with back, reacher     SUBJECTIVE:   Patient stated, “This is the way I move around.  I feel better.”    OBJECTIVE DATA SUMMARY:     Past Medical History:   Diagnosis Date    Arrhythmia 2017    a-fib    Hypertension     Stroke (HCC) 2020     Past Surgical History:   Procedure Laterality Date    OTHER SURGICAL HISTORY  02/2021    Watchman placement    PACEMAKER      UROLOGICAL  2016    bladder stone removal       Prior Level  supervision  Total Barthel Index Score: 80            The Barthel ADL Index: Guidelines  1. The index should be used as a record of what a patient does, not as a record of what a patient could do.  2. The main aim is to establish degree of independence from any help, physical or verbal, however minor and for whatever reason.  3. The need for supervision renders the patient not independent.  4. A patient's performance should be established using the best available evidence. Asking the patient, friends/relatives and nurses are the usual sources, but direct observation and common sense are also important. However direct testing is not needed.  5. Usually the patient's performance over the preceding 24-48 hours is important, but occasionally longer periods will be relevant.  6. Middle categories imply that the patient supplies over 50 per cent of the effort.  7. Use of aids to be independent is allowed.    Score Interpretation (from Sinoff 1997)    Independent   60-79 Minimally independent   40-59 Partially dependent   20-39 Very dependent   <20 Totally dependent     Tobin Peña., Barthel, D.W. (1965). Functional evaluation: the Barthel Index. Md State Med J (142.  PORTIA Peralta.F, IMANI Sheets., David, J.A., Chester, A.J. (1999). Measuring the change indisability after inpatient rehabilitation; comparison of the responsiveness of the Barthel Index and Functional [place] Measure. Journal of Neurology, Neurosurgery, and Psychiatry, 66(4), 480-484.  Karel Aldana, ALCIDESJ.A, GERMAN Nash, & Miko, M.A. (2004.) Assessment of post-stroke quality of life in cost-effectiveness studies: The usefulness of the Barthel Index and the EuroQoL-5D. Quality of Life Research, 13, 397-79       Pain Ratin/10, right foot  Pain Intervention(s):   rest and elevation    Activity Tolerance:   Fair  and requires rest breaks    After treatment:   Patient left in no apparent distress sitting up in chair,

## 2024-06-28 NOTE — PROGRESS NOTES
Spiritual Care Assessment/Progress Note  [facility]    Name: Og Aleman MRN: [id number]    Age: 89 y.o.     Sex: male   Language: English     Date: 6/28/2024            Total Time Calculated: 5 min              Spiritual Assessment begun in [facility] 3 MED TELE  Service Provided For: Patient not available  Referral/Consult From: Rounding  Encounter Overview/Reason: Attempted Encounter    Spiritual beliefs:      [] Involved in a jackson tradition/spiritual practice:      [] Supported by a jackson community:      [] Claims no spiritual orientation:      [] Seeking spiritual identity:           [] Adheres to an individual form of spirituality:      [x] Not able to assess:                Identified resources for coping and support system:   Support System: Unknown       [] Prayer                  [] Devotional reading               [] Music                  [] Guided Imagery     [] Pet visits                                        [] Other: (COMMENT)     Specific area/focus of visit   Encounter:    Crisis:    Spiritual/Emotional needs:    Ritual, Rites and Sacraments:    Grief, Loss, and Adjustments:    Ethics/Mediation:    Behavioral Health:    Palliative Care:    Advance Care Planning:      Plan/Referrals: Other (Comment) (Contact Spiritual Care for further consults.)    Narrative:  visited pt on rounds in Med Tele Unit.  Patient was not present during the visit, as pt was undergoing a procedure.   advised nurse of  availability.  Please contact Spiritual Care for any further referrals.    Chilo Fernando, PhD,  Intern  Spiritual Health Services  Paging Service: [phone] (DAVID)

## 2024-06-28 NOTE — PROGRESS NOTES
Comprehensive Nutrition Assessment    Type and Reason for Visit:  Initial, Positive Nutrition Screen    Nutrition Recommendations/Plan:   Continue current diet   Decrease ensure plus high protein to daily     Malnutrition Assessment:  Malnutrition Status:  Insufficient data (06/28/24 1431)      Nutrition Assessment:    Patient medically noted for right lower extremity cellulitis. PMH AFIB, HTN, HLD, and stroke. MST referral received. No weight history noted on chart review. Currently receiving a cardiac diet with ensure plus high protein TID. Good PO documented this morning. Patient in the bathroom at time of attempted visit; wound care waiting to visit him. Decrease ONS to daily for now. Encourage intake of meals and continue to document %PO of meals.      Patient Vitals for the past 120 hrs:   PO Meals Eaten (%)   06/28/24 0845 76 - 100%     Nutrition Related Findings:       BM 6/28   Atorvastatin, Prednisone   Wound Type: Wound Consult Pending       Current Nutrition Intake & Therapies:    Average Meal Intake: %     ADULT DIET; Regular; Low Fat/Low Chol/High Fiber/2 gm Na  ADULT ORAL NUTRITION SUPPLEMENT; Breakfast, Lunch, Dinner; Standard High Calorie/High Protein Oral Supplement    Anthropometric Measures:  Height: 167.6 cm (5' 6\")  Ideal Body Weight (IBW): 142 lbs (65 kg)       Current Body Weight: 74.8 kg (164 lb 14.5 oz),   IBW.    Current BMI (kg/m2): 26.6                          BMI Categories: Overweight (BMI 25.0-29.9)    Estimated Daily Nutrient Needs:  Energy Requirements Based On: Formula  Weight Used for Energy Requirements: Current  Energy (kcal/day): 1737 kcals (BMR x 1.3AF)  Weight Used for Protein Requirements: Current  Protein (g/day): 75-90g (1.0-1.2 g/kg bw)  Method Used for Fluid Requirements: 1 ml/kcal  Fluid (ml/day): 1750 mL    Nutrition Diagnosis:   No nutrition diagnosis at this time    Nutrition Interventions:   Food and/or Nutrient Delivery: Continue Current Diet, Modify

## 2024-06-28 NOTE — PROGRESS NOTES
Bladimir complete and note to follow.  Pt is at his ADL baseline.  Recommend OP PT for general strengthening and balance.

## 2024-06-28 NOTE — PROGRESS NOTES
Hospitalist Progress Note    NAME:   Og Aleman   : 1931   MRN: [id number]     Date/Time: 2024 3:01 PM  Patient PCP: Herman Ortiz MD    Estimated discharge date:   , [facility] with  for wound care  Barriers: IV abx, Podiatry clearance, MRI R foot      Assessment / Plan:    RLE cellulitis POA  Rule out  right first digit proximal phalanx osteomyelitis POA  Failed outpatient treatment with oral Bactrim and Keflex    Continue with cefepime and IV vancomycin.  Pharmacy consult for vancomycin dosing.  MRI right foot - pending  IP Podiatry consulted- following  Ip wound care consult awaited     Hypokalemia POA- resolved  Observe    Atrial fibrillation s/p Watchman device  Patient unsure about taking amiodarone.  Will consult pharmacy to verify that.  Continue with metoprolol for rate control.     HTN  HLD  Stroke  Continue with Lipitor, lisinopril.     BPH  Continue with finasteride.           Medical Decision Making:   I personally reviewed labs: CBC, BMP, LFT, ESR, lactic acid  I personally reviewed imaging:Chest x-ray, x-ray of right foot  I personally reviewed EKG:  Toxic drug monitoring:   H&H while patient is on Lovenox, kidney function while patient is on IV vancomycin  Discussed case with: Pt, Daughter at the bedside, RN, CM on IDRs        Code Status: Full code  DVT Prophylaxis: Lovenox  Baseline: Independent from ivette Smith, son Anibal is the DPOA.        Subjective:     Chief Complaint / Reason for Physician Visit::  F/U for R foot non healing ulcer/wound infection, Sepsis, cellulitis, ?OM, hypokalemia   \"I am ok\".  Discussed with RN events overnight.     Pt comfortable today with no new complains  No fever  Awaiting MRI R foot    Objective:     VITALS:   Last 24hrs VS reviewed since prior progress note. Most recent are:  Patient Vitals for the past 24 hrs:   BP Temp Temp src Pulse Resp SpO2   24 1445 (!) 141/95 97.4 °F (36.3 °C) Oral 82 16 98 %   24 0845 (!) 149/97 -- -- 75

## 2024-06-28 NOTE — PROGRESS NOTES
Spiritual Care Assessment/Progress Note  [facility]    Name: Og Aleman MRN: [id number]    Age: 89 y.o.     Sex: male   Language: English     Date: 6/28/2024            Total Time Calculated: 20 min              Spiritual Assessment begun in [facility] 3 MED TELE  Service Provided For: Patient  Referral/Consult From: Rounding  Encounter Overview/Reason: Initial Encounter    Spiritual beliefs:      [] Involved in a jackson tradition/spiritual practice:      [] Supported by a jackson community:      [] Claims no spiritual orientation:      [] Seeking spiritual identity:           [] Adheres to an individual form of spirituality:      [x] Not able to assess:                Identified resources for coping and support system:   Support System: Children       [] Prayer                  [] Devotional reading               [] Music                  [] Guided Imagery     [] Pet visits                                        [] Other: (COMMENT)     Specific area/focus of visit   Encounter:    Crisis:    Spiritual/Emotional needs: Type: Spiritual Support  Ritual, Rites and Sacraments:    Grief, Loss, and Adjustments:    Ethics/Mediation:    Behavioral Health:    Palliative Care:    Advance Care Planning:      Plan/Referrals: Other (Comment) (Contact Spiritual Care for further consults)    Narrative:  visited pt on rounds in Med Tele Unit.  Patient was present, awake, and alert during the visit.  Patient shared about his medical issues, family structure, and hopes for recovery.  Patient spoke at length about death and his approach to getting older/anticipating death.  asked why death was on his mind. Pt said \"I'm closer to the end than the beginning. I'm not getting any younger.\"  engaged in affirmation of feelings and advised pt of further  availability.  Pt appeared to enjoy having  visit. Please contact Spiritual Care for any further referrals.    Chilo Fernando, PhD,

## 2024-06-29 LAB — VANCOMYCIN SERPL-MCNC: 8.3 UG/ML

## 2024-06-29 PROCEDURE — 6370000000 HC RX 637 (ALT 250 FOR IP): Performed by: INTERNAL MEDICINE

## 2024-06-29 PROCEDURE — 80202 ASSAY OF VANCOMYCIN: CPT

## 2024-06-29 PROCEDURE — 6370000000 HC RX 637 (ALT 250 FOR IP): Performed by: PODIATRIST

## 2024-06-29 PROCEDURE — 1100000003 HC PRIVATE W/ TELEMETRY

## 2024-06-29 PROCEDURE — 87205 SMEAR GRAM STAIN: CPT

## 2024-06-29 PROCEDURE — 6360000002 HC RX W HCPCS: Performed by: INTERNAL MEDICINE

## 2024-06-29 PROCEDURE — 87070 CULTURE OTHR SPECIMN AEROBIC: CPT

## 2024-06-29 PROCEDURE — 2580000003 HC RX 258: Performed by: INTERNAL MEDICINE

## 2024-06-29 PROCEDURE — 87186 SC STD MICRODIL/AGAR DIL: CPT

## 2024-06-29 PROCEDURE — 36415 COLL VENOUS BLD VENIPUNCTURE: CPT

## 2024-06-29 PROCEDURE — 87077 CULTURE AEROBIC IDENTIFY: CPT

## 2024-06-29 RX ORDER — GENTAMICIN SULFATE 1 MG/G
CREAM TOPICAL DAILY
Status: DISCONTINUED | OUTPATIENT
Start: 2024-06-29 | End: 2024-07-11 | Stop reason: HOSPADM

## 2024-06-29 RX ADMIN — CEFEPIME 2000 MG: 2 INJECTION, POWDER, FOR SOLUTION INTRAVENOUS at 10:12

## 2024-06-29 RX ADMIN — PREDNISONE 20 MG: 20 TABLET ORAL at 10:25

## 2024-06-29 RX ADMIN — DILTIAZEM HYDROCHLORIDE 60 MG: 60 CAPSULE, EXTENDED RELEASE ORAL at 05:04

## 2024-06-29 RX ADMIN — ATORVASTATIN CALCIUM 20 MG: 20 TABLET, FILM COATED ORAL at 21:14

## 2024-06-29 RX ADMIN — ASPIRIN 81 MG: 81 TABLET, COATED ORAL at 19:03

## 2024-06-29 RX ADMIN — Medication: at 12:39

## 2024-06-29 RX ADMIN — ENOXAPARIN SODIUM 40 MG: 100 INJECTION SUBCUTANEOUS at 10:25

## 2024-06-29 RX ADMIN — FINASTERIDE 5 MG: 5 TABLET, FILM COATED ORAL at 10:25

## 2024-06-29 RX ADMIN — Medication: at 10:27

## 2024-06-29 RX ADMIN — VANCOMYCIN HYDROCHLORIDE 1000 MG: 1 INJECTION, POWDER, LYOPHILIZED, FOR SOLUTION INTRAVENOUS at 15:11

## 2024-06-29 RX ADMIN — GENTAMICIN SULFATE: 1 CREAM TOPICAL at 13:45

## 2024-06-29 RX ADMIN — SODIUM CHLORIDE, PRESERVATIVE FREE 10 ML: 5 INJECTION INTRAVENOUS at 10:26

## 2024-06-29 RX ADMIN — SODIUM CHLORIDE, PRESERVATIVE FREE 10 ML: 5 INJECTION INTRAVENOUS at 21:15

## 2024-06-29 RX ADMIN — CEFEPIME 2000 MG: 2 INJECTION, POWDER, FOR SOLUTION INTRAVENOUS at 21:14

## 2024-06-29 RX ADMIN — MELATONIN 3 MG: at 21:14

## 2024-06-29 RX ADMIN — METOPROLOL SUCCINATE 100 MG: 50 TABLET, FILM COATED, EXTENDED RELEASE ORAL at 21:15

## 2024-06-29 RX ADMIN — METOPROLOL SUCCINATE 100 MG: 50 TABLET, FILM COATED, EXTENDED RELEASE ORAL at 10:25

## 2024-06-29 RX ADMIN — TRAZODONE HYDROCHLORIDE 50 MG: 50 TABLET ORAL at 21:15

## 2024-06-29 RX ADMIN — DILTIAZEM HYDROCHLORIDE 60 MG: 60 CAPSULE, EXTENDED RELEASE ORAL at 19:04

## 2024-06-29 NOTE — PROGRESS NOTES
Pharmacy Antimicrobial Kinetic Dosing    Indication for Antimicrobials: bone/joint infection     Current Regimen of Each Antimicrobial:  Vancomycin pharm to dose; Start Date ; Day # 3  Cefepime 2g q 12h (start: , day 3    Previous Antimicrobial Therapy:    Goal Level: Vancomycin -600    Date Dose & Interval Measured (mcg/mL) Predicted AUC    1200 1g q 24h  8.3  377                 Significant Cultures:   : paired Bcx - ngtd x 1  : wound Cx - pending    Labs:  Recent Labs     Units 24  0138 24  1313   CREATININE MG/DL 0.99 1.11   BUN MG/DL 19 22*   PROCAL ng/mL  --  <0.05   WBC K/uL 7.6 6.5     Temp (24hrs), Av.7 °F (36.5 °C), Min:97.2 °F (36.2 °C), Max:98.6 °F (37 °C)    Conditions for Dosing Consideration: None    Creatinine Clearance (mL/min): Estimated Creatinine Clearance: 42 mL/min (based on SCr of 0.99 mg/dL).       Impression/Plan:   Vancomycin level results in subtherapeutic AUC (< 400)  Will adjust maintenance dose to Vancomycin 1000 mg Q18H for est -526  Cefepime as above for renal function/indication  Daily BMP - ordered until   Vancomycin level - ordered for  at 0300  Antimicrobial stop date to be determined     Pharmacy will follow daily and adjust medications as appropriate for renal function and/or serum levels.    Thank you,  Chapo Sage [facility]

## 2024-06-29 NOTE — PROGRESS NOTES
Hospitalist Progress Note    NAME:   Og Aleman   : 1931   MRN: [id number]     Date/Time: 2024 7:22 PM  Patient PCP: Herman Ortiz MD    Estimated discharge date:   , [facility] with  for wound care  Barriers: IV abx, Podiatry clearance    Assessment / Plan:    RLE cellulitis POA  Rule out  right first digit proximal phalanx osteomyelitis POA  Failed outpatient treatment with oral Bactrim and Keflex    Continue with cefepime and IV vancomycin.  Pharmacy consult for vancomycin dosing.  MRI right foot - pending  IP Podiatry consulted- following  Ip wound care consult awaited     Hypokalemia POA- resolved  Observe    Atrial fibrillation s/p Watchman device  Patient unsure about taking amiodarone.  Will consult pharmacy to verify that.  Continue with metoprolol for rate control.     HTN  HLD  Stroke  Continue with Lipitor, lisinopril.     BPH  Continue with finasteride.           Medical Decision Making:   I personally reviewed labs: CBC, BMP, LFT, ESR, lactic acid  I personally reviewed imaging:Chest x-ray, x-ray of right foot  I personally reviewed EKG:  Toxic drug monitoring:   H&H while patient is on Lovenox, kidney function while patient is on IV vancomycin  Discussed case with: Pt, Daughter at the bedside, RN, CM on IDRs        Code Status: Full code  DVT Prophylaxis: Lovenox  Baseline: Independent from ivette Smith, son Anibal is the DPOA.        Subjective:     Chief Complaint / Reason for Physician Visit::  F/U for R foot non healing ulcer/wound infection, Sepsis, cellulitis, ?OM, hypokalemia   \"I am ok\".  Discussed with RN events overnight.     Pt comfortable today with no new complains  No fever  Awaiting MRI R foot    Objective:     VITALS:   Last 24hrs VS reviewed since prior progress note. Most recent are:  Patient Vitals for the past 24 hrs:   BP Temp Temp src Pulse Resp SpO2   24 1612 (!) 118/56 -- Oral 60 18 97 %   24 1245 116/62 -- -- 80 -- 98 %   24 1020 (!)  102/58 -- -- -- -- --   06/29/24 1015 104/60 -- -- 75 16 98 %   06/29/24 0745 133/65 97.2 °F (36.2 °C) Rectal 74 16 96 %   06/29/24 0501 (!) 142/73 97.4 °F (36.3 °C) Oral 73 16 100 %   06/28/24 2000 131/79 98.6 °F (37 °C) Oral 83 16 96 %           Intake/Output Summary (Last 24 hours) at 6/29/2024 1922  Last data filed at 6/29/2024 1545  Gross per 24 hour   Intake 720 ml   Output 1 ml   Net 719 ml          I had a face to face encounter and independently examined this patient on 6/29/2024, as outlined below:  PHYSICAL EXAM:  General: Alert, cooperative  EENT:  EOMI. Anicteric sclerae.  Resp:  CTA bilaterally, no wheezing or rales.  No accessory muscle use  CV:  Regular  rhythm,  No edema  GI:  Soft, Non distended, Non tender.  +Bowel sounds  Neurologic:  Alert and oriented X 3, normal speech,   Psych:   Good insight. Not anxious nor agitated  Skin:  No rashes.  No jaundice  Extremities:  Improved Redness , swelling and tenderness of RLE + yellow discharge +    Reviewed most current lab test results and cultures  YES  Reviewed most current radiology test results   YES  Review and summation of old records today    NO  Reviewed patient's current orders and MAR    YES  PMH/SH reviewed - no change compared to H&P    Procedures: see electronic medical records for all procedures/Xrays and details which were not copied into this note but were reviewed prior to creation of Plan.      LABS:  I reviewed today's most current labs and imaging studies.  Pertinent labs include:  Recent Labs     06/27/24  1313 06/28/24  0138   WBC 6.5 7.6   HGB 13.5 13.8   HCT 41.0 43.1   * 118*       Recent Labs     06/27/24  1313 06/28/24  0138    138   K 3.2* 4.5    110*   CO2 24 22   GLUCOSE 107* 108*   BUN 22* 19   CREATININE 1.11 0.99   CALCIUM 9.0 8.9   MG  --  2.0   PHOS  --  2.2*   BILITOT 1.2* 1.6*   AST 27 28   ALT 21 21         Signed: Chace Bryant MD    Total time: 51 mins

## 2024-06-30 LAB
ANION GAP SERPL CALC-SCNC: 6 MMOL/L (ref 5–15)
BASOPHILS # BLD: 0 K/UL (ref 0–0.1)
BASOPHILS NFR BLD: 0 % (ref 0–1)
BUN SERPL-MCNC: 20 MG/DL (ref 6–20)
BUN/CREAT SERPL: 24 (ref 12–20)
CALCIUM SERPL-MCNC: 9.1 MG/DL (ref 8.5–10.1)
CHLORIDE SERPL-SCNC: 110 MMOL/L (ref 97–108)
CO2 SERPL-SCNC: 21 MMOL/L (ref 21–32)
CREAT SERPL-MCNC: 0.82 MG/DL (ref 0.7–1.3)
DIFFERENTIAL METHOD BLD: ABNORMAL
EOSINOPHIL # BLD: 0 K/UL (ref 0–0.4)
EOSINOPHIL NFR BLD: 0 % (ref 0–7)
ERYTHROCYTE [DISTWIDTH] IN BLOOD BY AUTOMATED COUNT: 13.2 % (ref 11.5–14.5)
GLUCOSE SERPL-MCNC: 113 MG/DL (ref 65–100)
HCT VFR BLD AUTO: 43.7 % (ref 36.6–50.3)
HGB BLD-MCNC: 13.7 G/DL (ref 12.1–17)
IMM GRANULOCYTES # BLD AUTO: 0 K/UL (ref 0–0.04)
IMM GRANULOCYTES NFR BLD AUTO: 0 % (ref 0–0.5)
LYMPHOCYTES # BLD: 2.8 K/UL (ref 0.8–3.5)
LYMPHOCYTES NFR BLD: 34 % (ref 12–49)
MCH RBC QN AUTO: 31.6 PG (ref 26–34)
MCHC RBC AUTO-ENTMCNC: 31.4 G/DL (ref 30–36.5)
MCV RBC AUTO: 100.7 FL (ref 80–99)
MONOCYTES # BLD: 0.6 K/UL (ref 0–1)
MONOCYTES NFR BLD: 8 % (ref 5–13)
NEUTS SEG # BLD: 4.7 K/UL (ref 1.8–8)
NEUTS SEG NFR BLD: 58 % (ref 32–75)
NRBC # BLD: 0 K/UL (ref 0–0.01)
NRBC BLD-RTO: 0 PER 100 WBC
PLATELET # BLD AUTO: 147 K/UL (ref 150–400)
PMV BLD AUTO: 12.7 FL (ref 8.9–12.9)
POTASSIUM SERPL-SCNC: 4.4 MMOL/L (ref 3.5–5.1)
RBC # BLD AUTO: 4.34 M/UL (ref 4.1–5.7)
SODIUM SERPL-SCNC: 137 MMOL/L (ref 136–145)
WBC # BLD AUTO: 8.2 K/UL (ref 4.1–11.1)

## 2024-06-30 PROCEDURE — 2580000003 HC RX 258: Performed by: INTERNAL MEDICINE

## 2024-06-30 PROCEDURE — 6360000002 HC RX W HCPCS: Performed by: INTERNAL MEDICINE

## 2024-06-30 PROCEDURE — 80048 BASIC METABOLIC PNL TOTAL CA: CPT

## 2024-06-30 PROCEDURE — 1100000003 HC PRIVATE W/ TELEMETRY

## 2024-06-30 PROCEDURE — 6370000000 HC RX 637 (ALT 250 FOR IP): Performed by: INTERNAL MEDICINE

## 2024-06-30 PROCEDURE — 85025 COMPLETE CBC W/AUTO DIFF WBC: CPT

## 2024-06-30 PROCEDURE — 6370000000 HC RX 637 (ALT 250 FOR IP): Performed by: PODIATRIST

## 2024-06-30 PROCEDURE — 36415 COLL VENOUS BLD VENIPUNCTURE: CPT

## 2024-06-30 RX ADMIN — GENTAMICIN SULFATE: 1 CREAM TOPICAL at 11:08

## 2024-06-30 RX ADMIN — ENOXAPARIN SODIUM 40 MG: 100 INJECTION SUBCUTANEOUS at 10:30

## 2024-06-30 RX ADMIN — ERGOCALCIFEROL 50000 UNITS: 1.25 CAPSULE ORAL at 10:29

## 2024-06-30 RX ADMIN — METHIMAZOLE 5 MG: 5 TABLET ORAL at 10:29

## 2024-06-30 RX ADMIN — ATORVASTATIN CALCIUM 20 MG: 20 TABLET, FILM COATED ORAL at 20:42

## 2024-06-30 RX ADMIN — ASPIRIN 81 MG: 81 TABLET, COATED ORAL at 17:55

## 2024-06-30 RX ADMIN — MELATONIN 3 MG: at 20:42

## 2024-06-30 RX ADMIN — LISINOPRIL 20 MG: 20 TABLET ORAL at 10:29

## 2024-06-30 RX ADMIN — CEFEPIME 2000 MG: 2 INJECTION, POWDER, FOR SOLUTION INTRAVENOUS at 20:41

## 2024-06-30 RX ADMIN — TAMSULOSIN HYDROCHLORIDE 0.4 MG: 0.4 CAPSULE ORAL at 10:29

## 2024-06-30 RX ADMIN — DILTIAZEM HYDROCHLORIDE 60 MG: 60 CAPSULE, EXTENDED RELEASE ORAL at 17:55

## 2024-06-30 RX ADMIN — SODIUM CHLORIDE, PRESERVATIVE FREE 10 ML: 5 INJECTION INTRAVENOUS at 10:51

## 2024-06-30 RX ADMIN — METOPROLOL SUCCINATE 100 MG: 50 TABLET, FILM COATED, EXTENDED RELEASE ORAL at 10:29

## 2024-06-30 RX ADMIN — DILTIAZEM HYDROCHLORIDE 60 MG: 60 CAPSULE, EXTENDED RELEASE ORAL at 05:34

## 2024-06-30 RX ADMIN — VANCOMYCIN HYDROCHLORIDE 1000 MG: 1 INJECTION, POWDER, LYOPHILIZED, FOR SOLUTION INTRAVENOUS at 10:51

## 2024-06-30 RX ADMIN — PREDNISONE 15 MG: 5 TABLET ORAL at 10:28

## 2024-06-30 RX ADMIN — SODIUM CHLORIDE, PRESERVATIVE FREE 10 ML: 5 INJECTION INTRAVENOUS at 20:41

## 2024-06-30 RX ADMIN — TRAZODONE HYDROCHLORIDE 50 MG: 50 TABLET ORAL at 20:42

## 2024-06-30 RX ADMIN — Medication: at 14:10

## 2024-06-30 RX ADMIN — Medication: at 11:07

## 2024-06-30 RX ADMIN — CEFEPIME 2000 MG: 2 INJECTION, POWDER, FOR SOLUTION INTRAVENOUS at 10:50

## 2024-06-30 RX ADMIN — FINASTERIDE 5 MG: 5 TABLET, FILM COATED ORAL at 10:29

## 2024-07-01 LAB
ANION GAP SERPL CALC-SCNC: 4 MMOL/L (ref 5–15)
BUN SERPL-MCNC: 22 MG/DL (ref 6–20)
BUN/CREAT SERPL: 26 (ref 12–20)
CALCIUM SERPL-MCNC: 8.9 MG/DL (ref 8.5–10.1)
CHLORIDE SERPL-SCNC: 111 MMOL/L (ref 97–108)
CO2 SERPL-SCNC: 23 MMOL/L (ref 21–32)
CREAT SERPL-MCNC: 0.85 MG/DL (ref 0.7–1.3)
ECHO BSA: 1.87 M2
GLUCOSE SERPL-MCNC: 157 MG/DL (ref 65–100)
POTASSIUM SERPL-SCNC: 4.1 MMOL/L (ref 3.5–5.1)
SODIUM SERPL-SCNC: 138 MMOL/L (ref 136–145)
VANCOMYCIN SERPL-MCNC: 12.4 UG/ML
VAS LEFT ABI: 1.03
VAS LEFT DORSALIS PEDIS BP: 144 MMHG
VAS LEFT PTA BP: 125 MMHG
VAS LEFT TBI: 0.96
VAS LEFT TOE PRESSURE: 134 MMHG
VAS RIGHT ABI: 0.99
VAS RIGHT ARM BP: 140 MMHG
VAS RIGHT DORSALIS PEDIS BP: 138 MMHG
VAS RIGHT PTA BP: 115 MMHG

## 2024-07-01 PROCEDURE — 6360000002 HC RX W HCPCS: Performed by: INTERNAL MEDICINE

## 2024-07-01 PROCEDURE — 6370000000 HC RX 637 (ALT 250 FOR IP): Performed by: INTERNAL MEDICINE

## 2024-07-01 PROCEDURE — 80202 ASSAY OF VANCOMYCIN: CPT

## 2024-07-01 PROCEDURE — 6370000000 HC RX 637 (ALT 250 FOR IP): Performed by: PODIATRIST

## 2024-07-01 PROCEDURE — 1100000003 HC PRIVATE W/ TELEMETRY

## 2024-07-01 PROCEDURE — 80048 BASIC METABOLIC PNL TOTAL CA: CPT

## 2024-07-01 PROCEDURE — 2580000003 HC RX 258: Performed by: INTERNAL MEDICINE

## 2024-07-01 PROCEDURE — 36415 COLL VENOUS BLD VENIPUNCTURE: CPT

## 2024-07-01 RX ADMIN — ASPIRIN 81 MG: 81 TABLET, COATED ORAL at 21:25

## 2024-07-01 RX ADMIN — DILTIAZEM HYDROCHLORIDE 60 MG: 60 CAPSULE, EXTENDED RELEASE ORAL at 05:30

## 2024-07-01 RX ADMIN — GENTAMICIN SULFATE: 1 CREAM TOPICAL at 09:27

## 2024-07-01 RX ADMIN — CEFEPIME 2000 MG: 2 INJECTION, POWDER, FOR SOLUTION INTRAVENOUS at 09:23

## 2024-07-01 RX ADMIN — SODIUM CHLORIDE, PRESERVATIVE FREE 10 ML: 5 INJECTION INTRAVENOUS at 21:46

## 2024-07-01 RX ADMIN — METOPROLOL SUCCINATE 100 MG: 50 TABLET, FILM COATED, EXTENDED RELEASE ORAL at 09:19

## 2024-07-01 RX ADMIN — Medication: at 14:00

## 2024-07-01 RX ADMIN — DILTIAZEM HYDROCHLORIDE 60 MG: 60 CAPSULE, EXTENDED RELEASE ORAL at 17:33

## 2024-07-01 RX ADMIN — FINASTERIDE 5 MG: 5 TABLET, FILM COATED ORAL at 09:20

## 2024-07-01 RX ADMIN — TRAZODONE HYDROCHLORIDE 50 MG: 50 TABLET ORAL at 21:16

## 2024-07-01 RX ADMIN — VANCOMYCIN HYDROCHLORIDE 1000 MG: 1 INJECTION, POWDER, LYOPHILIZED, FOR SOLUTION INTRAVENOUS at 21:32

## 2024-07-01 RX ADMIN — TAMSULOSIN HYDROCHLORIDE 0.4 MG: 0.4 CAPSULE ORAL at 09:19

## 2024-07-01 RX ADMIN — SODIUM CHLORIDE, PRESERVATIVE FREE 10 ML: 5 INJECTION INTRAVENOUS at 09:20

## 2024-07-01 RX ADMIN — Medication: at 09:25

## 2024-07-01 RX ADMIN — MELATONIN 3 MG: at 21:15

## 2024-07-01 RX ADMIN — VANCOMYCIN HYDROCHLORIDE 1000 MG: 1 INJECTION, POWDER, LYOPHILIZED, FOR SOLUTION INTRAVENOUS at 04:02

## 2024-07-01 RX ADMIN — PREDNISONE 15 MG: 5 TABLET ORAL at 09:24

## 2024-07-01 RX ADMIN — CEFEPIME 2000 MG: 2 INJECTION, POWDER, FOR SOLUTION INTRAVENOUS at 21:15

## 2024-07-01 RX ADMIN — ENOXAPARIN SODIUM 40 MG: 100 INJECTION SUBCUTANEOUS at 09:21

## 2024-07-01 RX ADMIN — LISINOPRIL 20 MG: 20 TABLET ORAL at 09:19

## 2024-07-01 RX ADMIN — ATORVASTATIN CALCIUM 20 MG: 20 TABLET, FILM COATED ORAL at 21:16

## 2024-07-01 ASSESSMENT — PAIN SCALES - GENERAL: PAINLEVEL_OUTOF10: 0

## 2024-07-01 NOTE — PROGRESS NOTES
All labs reviewed.  Wound cultures grew pseudomonas; sensitivities pending   MRI right foot did not show any evidence of osteomyelitis right foot  GUILLERMINA's indicate mild arterial disease lower legs.    This patient can probably be discharged on oral antibiotics pending wound cultures sensitivities.    He  to follow with me at the wound clinic within 1-2 weeks of discharge

## 2024-07-01 NOTE — CARE COORDINATION
Care Management Initial Assessment       RUR: 11%  Readmission? No  1st IM letter given? Yes  1st  letter given: No     Chart reviewed. Assessment completed. Verified contact information and demographics. Pt resides alone in [facility] which is one level with no stairs. Pt lives on 2nd floor and has elevator access. Pt is typically independent with ADLs and ambulatory with a cane or RW as needed. Pt has a transport chair as well. [facility] [place] [facility] meals. Son lives locally in [facility] ([place]). Preferred pharmacy is [facility] on [place] [address]. Pt hx of stay at [facility] HC unit. Son can transport home upon d/c.     Pt does not feel he will need to d/c to HC unit- prefers to return to his own [facility] [place] apartment with home health at [facility]. No [facility] preference indicated- states that [facility] has own therapy on site.    Will continue to follow.       07/01/24 3487   Service Assessment   Patient Orientation Alert and Oriented   Cognition Alert   History Provided By Patient   Primary Caregiver Self   Support Systems Children;Other (Comment)  ([facility])   Patient's Healthcare Decision Maker is: Legal Next of Kin   PCP Verified by CM Yes   Last Visit to PCP Within last 6 months   Prior Functional Level Assistance with the following:;Cooking;Housework;Shopping   Current Functional Level Assistance with the following:;Cooking;Housework;Shopping   Can patient return to prior living arrangement Yes   Ability to make needs known: Good   Family able to assist with home care needs: Yes   Would you like for me to discuss the discharge plan with any other family members/significant others, and if so, who? Yes  (son)   Financial Resources Medicare;Other (Comment)  (BCBS)   Social/Functional History   Lives With Alone   Type of Home Senior housing apartment   Home Layout One level   Home Access Level entry;Elevator   Bathroom Shower/Tub Walk-in shower   Home Equipment Sock

## 2024-07-01 NOTE — PROGRESS NOTES
Foot and Ankle Progress Note    Admit Date: 2024  Hospital day 4    Subjective:     Patient was admitted thru the ER secondary to a painful rigth foot of several days.    No Known Allergies     History:     No family history on file.   Past Medical History:   Diagnosis Date    Arrhythmia 2017    a-fib    Hypertension     Stroke (HCC)       Social History     Substance and Sexual Activity   Alcohol Use Yes    Alcohol/week: 1.0 standard drink of alcohol      Social History     Substance and Sexual Activity   Drug Use Never      Past Surgical History:   Procedure Laterality Date    OTHER SURGICAL HISTORY  2021    Watchman placement    PACEMAKER      UROLOGICAL  2016    bladder stone removal      Social History     Tobacco Use   Smoking Status Former    Current packs/day: 0.00    Types: Cigarettes    Quit date: 1995    Years since quittin.5   Smokeless Tobacco Never        Current Facility-Administered Medications   Medication Dose Route Frequency    gentamicin (GARAMYCIN) 0.1 % cream   Topical Daily    vancomycin (VANCOCIN) 1,000 mg in sodium chloride 0.9 % 250 mL IVPB (Iorf4Blu)  1,000 mg IntraVENous Q18H    ammonium lactate (LAC-HYDRIN) 12 % lotion   Topical BID    aspirin EC tablet 81 mg  81 mg Oral Daily    traZODone (DESYREL) tablet 50 mg  50 mg Oral Nightly    tamsulosin (FLOMAX) capsule 0.4 mg  0.4 mg Oral Daily    methIMAzole (TAPAZOLE) tablet 5 mg  5 mg Oral Weekly    vitamin D (ERGOCALCIFEROL) capsule 50,000 Units  50,000 Units Oral Weekly    dilTIAZem (CARDIZEM 12 HR) extended release capsule 60 mg  60 mg Oral BID    dilTIAZem injection 5 mg  5 mg IntraVENous Q4H PRN    finasteride (PROSCAR) tablet 5 mg  5 mg Oral Daily    lisinopril (PRINIVIL;ZESTRIL) tablet 20 mg  20 mg Oral Daily    sodium chloride flush 0.9 % injection 5-40 mL  5-40 mL IntraVENous 2 times per day    sodium chloride flush 0.9 % injection 5-40 mL  5-40 mL IntraVENous PRN    0.9 % sodium chloride infusion   IntraVENous  PRN    enoxaparin (LOVENOX) injection 40 mg  40 mg SubCUTAneous Daily    ondansetron (ZOFRAN-ODT) disintegrating tablet 4 mg  4 mg Oral Q8H PRN    Or    ondansetron (ZOFRAN) injection 4 mg  4 mg IntraVENous Q6H PRN    polyethylene glycol (GLYCOLAX) packet 17 g  17 g Oral Daily PRN    acetaminophen (TYLENOL) tablet 650 mg  650 mg Oral Q6H PRN    Or    acetaminophen (TYLENOL) suppository 650 mg  650 mg Rectal Q6H PRN    melatonin tablet 3 mg  3 mg Oral Nightly    aluminum & magnesium hydroxide-simethicone (MAALOX) 200-200-20 MG/5ML suspension 30 mL  30 mL Oral Q6H PRN    ceFEPIme (MAXIPIME) 2,000 mg in sodium chloride 0.9 % 100 mL IVPB (mini-bag)  2,000 mg IntraVENous Q12H    atorvastatin (LIPITOR) tablet 20 mg  20 mg Oral Nightly    [Held by provider] metoprolol succinate (TOPROL XL) extended release tablet 100 mg  100 mg Oral BID    predniSONE (DELTASONE) tablet 15 mg  15 mg Oral Daily    Followed by    [START ON 7/3/2024] predniSONE (DELTASONE) tablet 10 mg  10 mg Oral Daily    Followed by    [START ON 7/6/2024] predniSONE (DELTASONE) tablet 5 mg  5 mg Oral Daily        Review of Systems  A comprehensive review of systems was negative    H&P consistent with admitting H&P  Objective:     Patient Vitals for the past 8 hrs:   BP Temp src Pulse Resp SpO2   07/01/24 1525 118/63 Temporal 75 14 97 %       No intake/output data recorded.  06/29 1901 - 07/01 0700  In: 200 [P.O.:200]  Out: -       PHYSICAL EXAM LOWER LEGS:  Non palpable pedal pulses with epicritic sensation intact  Right midfoot min red; no longer painful  nor warm  Partial thickness wound plantar lateral and 1st right web; mild drainage and painful     WBC: 8.2    Afebrile    Xray right foot:  Vascular calcifications    Bony erosion medial and lateral base 1st proximal phalanx that is more than likely secondary to DJD vs gout as opposed to osteomyelitis                Assessment:     Principal Problem:    Diabetic foot ulcer with osteomyelitis (HCC)  Active

## 2024-07-01 NOTE — PROGRESS NOTES
Hospitalist Progress Note    NAME: Og Aleman   :  1931   MRN:  [id number]            Subjective:     Chief Complaint / Reason for Physician Visit Foot infection  Patient seen and evaluated at bedside, overnight events reviewed, patient currently has no new complaints.  Discussed with RN events overnight.     Review of Systems:  Symptom Y/N Comments  Symptom Y/N Comments   Fever/Chills N   Chest Pain N    Poor Appetite Y   Edema N    Cough N   Abdominal Pain N    Sputum N   Joint Pain N    SOB/GARCIA N   Pruritis/Rash N    Nausea/vomit N   Tolerating PT/OT NA    Diarrhea N   Tolerating Diet Y    Constipation N   Other       Could NOT obtain due to:      Objective:     VITALS:   Last 24hrs VS reviewed since prior progress note. Most recent are:  [unfilled]  No intake or output data in the 24 hours ending 24 0905     PHYSICAL EXAM:  General: Patient appears comfortable    EENT:  EOMI. Anicteric sclerae. MMM  Resp:  CTA bilaterally, no wheezing or rales.  No accessory muscle use  CV:  Regular  rhythm, s1/s2 no m/r/g No edema  GI:  Soft, Non distended, Non tender.  +Bowel sounds  Neurologic:  Alert and oriented X 3, normal speech,   Psych:   Good insight. Not anxious nor agitated  Skin:     Wendy Forrester RN  Registered Nurse  Wound Care     Wound Care     Addendum     Date of Service: 2024  2:49 PM              Right foot prior to cleansing the skin.        Fissure behind the right great toe: pink and  Painful, \"raw\":                              Revision History         Procedures: see electronic medical records for all procedures/Xrays and details which were not copied into this note but were reviewed prior to creation of Plan.      LABS:  I reviewed today's most current labs and imaging studies.  Pertinent labs include:  Recent Labs     24   WBC 8.2   HGB 13.7   HCT 43.7   *     Recent Labs     24  0423 24  0014    138   K 4.4 4.1   * 111*   CO2 21 23   BUN

## 2024-07-01 NOTE — PLAN OF CARE
Predictive Model Details          26 (Normal)  Factor Value    Calculated 7/1/2024 16:08 56% Age 89 years old    Deterioration Index Model 16% Cardiac rhythm Atrial fib     15% Respiratory rate 14     4% BUN abnormal (22 MG/DL)     3% Sodium 138 mmol/L     2% Systolic 118     2% Platelet count abnormal (147 K/uL)     1% Potassium 4.1 mmol/L     1% WBC count 8.2 K/uL     0% Temperature 97.3 °F (36.3 °C)     0% Pulse oximetry 97 %     0% Hematocrit 43.7 %     0% Pulse 75       Problem: Discharge Planning  Goal: Discharge to home or other facility with appropriate resources  Outcome: Progressing     Problem: Safety - Adult  Goal: Free from fall injury  Outcome: Progressing     Problem: ABCDS Injury Assessment  Goal: Absence of physical injury  Outcome: Progressing     Problem: Chronic Conditions and Co-morbidities  Goal: Patient's chronic conditions and co-morbidity symptoms are monitored and maintained or improved  Outcome: Progressing     Problem: Skin/Tissue Integrity  Goal: Absence of new skin breakdown  Description: 1.  Monitor for areas of redness and/or skin breakdown  2.  Assess vascular access sites hourly  3.  Every 4-6 hours minimum:  Change oxygen saturation probe site  4.  Every 4-6 hours:  If on nasal continuous positive airway pressure, respiratory therapy assess nares and determine need for appliance change or resting period.  Outcome: Progressing

## 2024-07-01 NOTE — PROGRESS NOTES
Hospitalist Progress Note    NAME:   Og Aleman   : 1931   MRN: [id number]     Date/Time: 2024 8:55 PM  Patient PCP: Herman Ortiz MD    Estimated discharge date:   , [facility] with  for wound care  Barriers: IV abx, Podiatry clearance    Assessment / Plan:    RLE cellulitis POA  Rule out  right first digit proximal phalanx osteomyelitis POA  Failed outpatient treatment with oral Bactrim and Keflex    Continue with cefepime and IV vancomycin.  Pharmacy consult for vancomycin dosing.  MRI right foot negative for osteomyelitis   IP Podiatry consulted- following  Ip wound care consult   Follow up wound cx grow pseudomonas   Follow up final      Hypokalemia POA- resolved  Observe    Atrial fibrillation s/p Watchman device  Patient unsure about taking amiodarone.  Will consult pharmacy to verify that.  Continue with metoprolol for rate control.     HTN  HLD  Stroke  Continue with Lipitor, lisinopril.     BPH  Continue with finasteride.           Medical Decision Making:   I personally reviewed labs: CBC, BMP, LFT, ESR, lactic acid  I personally reviewed imaging:Chest x-ray, x-ray of right foot  I personally reviewed EKG:  Toxic drug monitoring:   H&H while patient is on Lovenox, kidney function while patient is on IV vancomycin  Discussed case with: Pt, Daughter at the bedside, RN, CM on IDRs        Code Status: Full code  DVT Prophylaxis: Lovenox  Baseline: Independent from ievtte Smith, son Anibal is the DPOA.        Subjective:     Chief Complaint / Reason for Physician Visit::  F/U for R foot non healing ulcer/wound infection, Sepsis, cellulitis, ?OM, hypokalemia   \"I am ok\".  Discussed with RN events overnight.     Pt comfortable today with no new complains  No fever  Awaiting MRI R foot    Objective:     VITALS:   Last 24hrs VS reviewed since prior progress note. Most recent are:  Patient Vitals for the past 24 hrs:   BP Temp Temp src Pulse Resp SpO2   24 139/70 97.3 °F (36.3 °C) --  57 16 98 %   06/30/24 1615 -- 98.2 °F (36.8 °C) Rectal -- -- --   06/30/24 1506 116/61 97.7 °F (36.5 °C) Oral 77 16 98 %   06/30/24 1400 123/66 -- Oral 90 -- 96 %   06/30/24 0730 (!) 150/70 97.3 °F (36.3 °C) Oral 70 -- 98 %   06/30/24 0355 (!) 145/68 97.5 °F (36.4 °C) Oral 69 16 99 %           Intake/Output Summary (Last 24 hours) at 6/30/2024 2055  Last data filed at 6/30/2024 0730  Gross per 24 hour   Intake 200 ml   Output --   Net 200 ml          I had a face to face encounter and independently examined this patient on 6/30/2024, as outlined below:  PHYSICAL EXAM:  General: Alert, cooperative  EENT:  EOMI. Anicteric sclerae.  Resp:  CTA bilaterally, no wheezing or rales.  No accessory muscle use  CV:  Regular  rhythm,  No edema  GI:  Soft, Non distended, Non tender.  +Bowel sounds  Neurologic:  Alert and oriented X 3, normal speech,   Psych:   Good insight. Not anxious nor agitated  Skin:  No rashes.  No jaundice  Extremities:  Improved Redness , swelling and tenderness of RLE + yellow discharge +    Reviewed most current lab test results and cultures  YES  Reviewed most current radiology test results   YES  Review and summation of old records today    NO  Reviewed patient's current orders and MAR    YES  PMH/SH reviewed - no change compared to H&P    Procedures: see electronic medical records for all procedures/Xrays and details which were not copied into this note but were reviewed prior to creation of Plan.      LABS:  I reviewed today's most current labs and imaging studies.  Pertinent labs include:  Recent Labs     06/28/24  0138 06/30/24 0423   WBC 7.6 8.2   HGB 13.8 13.7   HCT 43.1 43.7   * 147*       Recent Labs     06/28/24  0138 06/30/24  0423    137   K 4.5 4.4   * 110*   CO2 22 21   GLUCOSE 108* 113*   BUN 19 20   CREATININE 0.99 0.82   CALCIUM 8.9 9.1   MG 2.0  --    PHOS 2.2*  --    BILITOT 1.6*  --    AST 28  --    ALT 21  --          Signed: Chace Bryant MD    Total time: 51

## 2024-07-01 NOTE — PROGRESS NOTES
MD made aware by this RN of patient's heart rate bradying down into the 30's throughout the shift and having 3-4 second pauses while at rest. Verbal order obtained to hold metoprolol 100mg PO.

## 2024-07-01 NOTE — PROGRESS NOTES
Pharmacy Antimicrobial Kinetic Dosing    Indication for Antimicrobials: bone/joint infection     Current Regimen of Each Antimicrobial:  Vancomycin pharm to dose; Start Date ; Day # 5  Cefepime 2g q 12h (start: , day 5    Previous Antimicrobial Therapy:  Failed outpatient treatment with bactrim and keflex    Goal Level: Vancomycin -600    Date Dose & Interval Measured (mcg/mL) Predicted AUC    1200 1g q 24h  8.3  377    00:14 1g q 18h 12.4 504           Significant Cultures:   : paired Bcx - ngtd, pending   : wound Cx - heavy pseudomonas, heavy probable enterococcus, pending     Labs:  Recent Labs     Units 24  0014 24  0423   CREATININE MG/DL 0.85 0.82   BUN MG/DL 22* 20   WBC K/uL  --  8.2     Temp (24hrs), Av.6 °F (36.4 °C), Min:97.3 °F (36.3 °C), Max:98.2 °F (36.8 °C)    Conditions for Dosing Consideration: None    Creatinine Clearance (mL/min): Estimated Creatinine Clearance: 49 mL/min (based on SCr of 0.85 mg/dL).     Impression/Plan:   The vancomycin level resulted at 12.4mcg/ml (). Will continue with the current regimen of 1000 mg IV q18h.   Cefepime as above for renal function/indication  Daily BMP - ordered until   Antimicrobial stop date to be determined     Pharmacy will follow daily and adjust medications as appropriate for renal function and/or serum levels.    Thank you,  Dory Rosas, [facility]

## 2024-07-02 LAB
ANION GAP SERPL CALC-SCNC: 3 MMOL/L (ref 5–15)
BACTERIA SPEC CULT: ABNORMAL
BACTERIA SPEC CULT: ABNORMAL
BASOPHILS # BLD: 0.1 K/UL (ref 0–0.1)
BASOPHILS NFR BLD: 1 % (ref 0–1)
BUN SERPL-MCNC: 19 MG/DL (ref 6–20)
BUN/CREAT SERPL: 20 (ref 12–20)
CALCIUM SERPL-MCNC: 9.3 MG/DL (ref 8.5–10.1)
CHLORIDE SERPL-SCNC: 110 MMOL/L (ref 97–108)
CO2 SERPL-SCNC: 26 MMOL/L (ref 21–32)
CREAT SERPL-MCNC: 0.94 MG/DL (ref 0.7–1.3)
DIFFERENTIAL METHOD BLD: ABNORMAL
EOSINOPHIL # BLD: 0 K/UL (ref 0–0.4)
EOSINOPHIL NFR BLD: 0 % (ref 0–7)
ERYTHROCYTE [DISTWIDTH] IN BLOOD BY AUTOMATED COUNT: 13.4 % (ref 11.5–14.5)
GLUCOSE SERPL-MCNC: 104 MG/DL (ref 65–100)
GRAM STN SPEC: ABNORMAL
HCT VFR BLD AUTO: 43.1 % (ref 36.6–50.3)
HGB BLD-MCNC: 13.6 G/DL (ref 12.1–17)
IMM GRANULOCYTES # BLD AUTO: 0 K/UL (ref 0–0.04)
IMM GRANULOCYTES NFR BLD AUTO: 0 % (ref 0–0.5)
LYMPHOCYTES # BLD: 3.2 K/UL (ref 0.8–3.5)
LYMPHOCYTES NFR BLD: 33 % (ref 12–49)
MCH RBC QN AUTO: 32 PG (ref 26–34)
MCHC RBC AUTO-ENTMCNC: 31.6 G/DL (ref 30–36.5)
MCV RBC AUTO: 101.4 FL (ref 80–99)
MONOCYTES # BLD: 0.7 K/UL (ref 0–1)
MONOCYTES NFR BLD: 7 % (ref 5–13)
NEUTS SEG # BLD: 5.6 K/UL (ref 1.8–8)
NEUTS SEG NFR BLD: 59 % (ref 32–75)
NRBC # BLD: 0 K/UL (ref 0–0.01)
NRBC BLD-RTO: 0 PER 100 WBC
PLATELET # BLD AUTO: 149 K/UL (ref 150–400)
PMV BLD AUTO: 12.5 FL (ref 8.9–12.9)
POTASSIUM SERPL-SCNC: 4.5 MMOL/L (ref 3.5–5.1)
RBC # BLD AUTO: 4.25 M/UL (ref 4.1–5.7)
RBC MORPH BLD: ABNORMAL
SERVICE CMNT-IMP: ABNORMAL
SODIUM SERPL-SCNC: 139 MMOL/L (ref 136–145)
WBC # BLD AUTO: 9.6 K/UL (ref 4.1–11.1)
WBC MORPH BLD: ABNORMAL

## 2024-07-02 PROCEDURE — 2580000003 HC RX 258: Performed by: INTERNAL MEDICINE

## 2024-07-02 PROCEDURE — 6360000002 HC RX W HCPCS: Performed by: INTERNAL MEDICINE

## 2024-07-02 PROCEDURE — 6370000000 HC RX 637 (ALT 250 FOR IP): Performed by: PODIATRIST

## 2024-07-02 PROCEDURE — 36415 COLL VENOUS BLD VENIPUNCTURE: CPT

## 2024-07-02 PROCEDURE — 85025 COMPLETE CBC W/AUTO DIFF WBC: CPT

## 2024-07-02 PROCEDURE — 6370000000 HC RX 637 (ALT 250 FOR IP): Performed by: INTERNAL MEDICINE

## 2024-07-02 PROCEDURE — 80048 BASIC METABOLIC PNL TOTAL CA: CPT

## 2024-07-02 PROCEDURE — 1100000003 HC PRIVATE W/ TELEMETRY

## 2024-07-02 RX ORDER — PREDNISONE 10 MG/1
10 TABLET ORAL DAILY
Qty: 3 TABLET | Refills: 0 | Status: SHIPPED | OUTPATIENT
Start: 2024-07-03 | End: 2024-07-06

## 2024-07-02 RX ORDER — AMMONIUM LACTATE 12 G/100G
LOTION TOPICAL
Qty: 225 G | Refills: 0 | Status: SHIPPED | OUTPATIENT
Start: 2024-07-02

## 2024-07-02 RX ORDER — MAGNESIUM HYDROXIDE/ALUMINUM HYDROXICE/SIMETHICONE 120; 1200; 1200 MG/30ML; MG/30ML; MG/30ML
30 SUSPENSION ORAL EVERY 6 HOURS PRN
Qty: 769 ML | Refills: 0 | Status: SHIPPED | OUTPATIENT
Start: 2024-07-02

## 2024-07-02 RX ORDER — GENTAMICIN SULFATE 1 MG/G
CREAM TOPICAL
Qty: 15 G | Refills: 0 | Status: SHIPPED | OUTPATIENT
Start: 2024-07-03

## 2024-07-02 RX ORDER — LEVOFLOXACIN 750 MG/1
750 TABLET, FILM COATED ORAL
Qty: 7 TABLET | Refills: 0 | Status: SHIPPED | OUTPATIENT
Start: 2024-07-02 | End: 2024-07-16

## 2024-07-02 RX ORDER — LANOLIN ALCOHOL/MO/W.PET/CERES
3 CREAM (GRAM) TOPICAL NIGHTLY
Qty: 30 TABLET | Refills: 0 | Status: SHIPPED | OUTPATIENT
Start: 2024-07-02

## 2024-07-02 RX ORDER — DILTIAZEM HYDROCHLORIDE 60 MG/1
60 CAPSULE, EXTENDED RELEASE ORAL 2 TIMES DAILY
Qty: 60 CAPSULE | Refills: 3 | Status: SHIPPED | OUTPATIENT
Start: 2024-07-02

## 2024-07-02 RX ORDER — PREDNISONE 5 MG/1
5 TABLET ORAL DAILY
Qty: 3 TABLET | Refills: 0 | Status: SHIPPED | OUTPATIENT
Start: 2024-07-06 | End: 2024-07-09

## 2024-07-02 RX ADMIN — Medication: at 14:35

## 2024-07-02 RX ADMIN — GENTAMICIN SULFATE: 1 CREAM TOPICAL at 08:40

## 2024-07-02 RX ADMIN — CEFEPIME 2000 MG: 2 INJECTION, POWDER, FOR SOLUTION INTRAVENOUS at 08:35

## 2024-07-02 RX ADMIN — TRAZODONE HYDROCHLORIDE 50 MG: 50 TABLET ORAL at 21:08

## 2024-07-02 RX ADMIN — SODIUM CHLORIDE, PRESERVATIVE FREE 10 ML: 5 INJECTION INTRAVENOUS at 21:09

## 2024-07-02 RX ADMIN — FINASTERIDE 5 MG: 5 TABLET, FILM COATED ORAL at 08:35

## 2024-07-02 RX ADMIN — LISINOPRIL 20 MG: 20 TABLET ORAL at 08:34

## 2024-07-02 RX ADMIN — PREDNISONE 15 MG: 5 TABLET ORAL at 08:36

## 2024-07-02 RX ADMIN — TAMSULOSIN HYDROCHLORIDE 0.4 MG: 0.4 CAPSULE ORAL at 08:35

## 2024-07-02 RX ADMIN — VANCOMYCIN HYDROCHLORIDE 1000 MG: 1 INJECTION, POWDER, LYOPHILIZED, FOR SOLUTION INTRAVENOUS at 17:06

## 2024-07-02 RX ADMIN — MELATONIN 3 MG: at 21:08

## 2024-07-02 RX ADMIN — ATORVASTATIN CALCIUM 20 MG: 20 TABLET, FILM COATED ORAL at 21:08

## 2024-07-02 RX ADMIN — DILTIAZEM HYDROCHLORIDE 60 MG: 60 CAPSULE, EXTENDED RELEASE ORAL at 05:35

## 2024-07-02 RX ADMIN — Medication: at 08:40

## 2024-07-02 RX ADMIN — ENOXAPARIN SODIUM 40 MG: 100 INJECTION SUBCUTANEOUS at 08:33

## 2024-07-02 RX ADMIN — ASPIRIN 81 MG: 81 TABLET, COATED ORAL at 17:07

## 2024-07-02 RX ADMIN — CEFEPIME 2000 MG: 2 INJECTION, POWDER, FOR SOLUTION INTRAVENOUS at 21:18

## 2024-07-02 ASSESSMENT — PAIN SCALES - GENERAL: PAINLEVEL_OUTOF10: 0

## 2024-07-02 NOTE — PLAN OF CARE
Problem: Discharge Planning  Goal: Discharge to home or other facility with appropriate resources  7/2/2024 1018 by Yue Mishra RN  Outcome: Progressing  7/2/2024 0053 by Angelina Nur RN  Outcome: Progressing     Problem: Safety - Adult  Goal: Free from fall injury  7/2/2024 1018 by Yue Mishra RN  Outcome: Progressing  7/2/2024 0053 by Angelina Nur RN  Outcome: Progressing     Problem: ABCDS Injury Assessment  Goal: Absence of physical injury  7/2/2024 1018 by Yue Mishra RN  Outcome: Progressing  7/2/2024 0053 by Angelina Nur RN  Outcome: Progressing     Problem: Chronic Conditions and Co-morbidities  Goal: Patient's chronic conditions and co-morbidity symptoms are monitored and maintained or improved  7/2/2024 1018 by Yue Mishra RN  Outcome: Progressing  7/2/2024 0053 by Angelina Nur RN  Outcome: Progressing     Problem: Skin/Tissue Integrity  Goal: Absence of new skin breakdown  Description: 1.  Monitor for areas of redness and/or skin breakdown  2.  Assess vascular access sites hourly  3.  Every 4-6 hours minimum:  Change oxygen saturation probe site  4.  Every 4-6 hours:  If on nasal continuous positive airway pressure, respiratory therapy assess nares and determine need for appliance change or resting period.  7/2/2024 1018 by Yue Mishra RN  Outcome: Progressing  7/2/2024 0053 by Angelina Nur RN  Outcome: Progressing

## 2024-07-02 NOTE — CARE COORDINATION
Transition of Care Plan:    RUR: 10%  Prior Level of Functioning: Independent  Disposition: HC Unit at [facility] [place]  If SNF or IPR: Date FOC offered: 7/2  Date FOC received: 7/2 (patient would like to go to [facility] HC Unit upon discharge)  Accepting facility: Patient is a resident of [facility]  Date authorization started with reference number: N/A  Follow up appointments: Defer to SNF  DME needed: Defer to SNF  Transportation at discharge: SonOg Jr, [phone]  IM/IMM Medicare/ letter given: Yes, 2nd IM given on 7/1  Is patient a [place] and connected with VA? N/A   If yes, was  transfer form completed and VA notified? N/A  Caregiver Contact: Og Cummings Jr, [phone]  Discharge Caregiver contacted prior to discharge? Yes, CM contacted on 7/2/24  Care Conference needed? Not at this time  Barriers to discharge:  Placement    CM spoke with pt's son, Og [phone], via phone to discuss pt's discharge plan. Per CM note from yesterday, pt expressed that he would like to go home to his apartment at [facility] with home health services. Og (pt's son) told CM that he spoke with pt this morning and discussed pt discharging to the HC Unit at [facility] instead of his own apartment. Og feels that pt is not safe to return home on his own at this time. Og told CM that pt is agreeable to this plan and that he has spoken with [facility] about pt discharging to the HC Unit. Og is going to speak with pt about this again to confirm that he would still like to go to the HC Unit. Og to call CM back.    CM called [facility] Admissions (Liz, [phone]) to confirm whether they are able to accept pt to the HC Unit at [facility]. Liz confirmed that she has already spoken with pt's son about this and does not anticipate any issues with pt discharging to the HC Unit. Liz requested a referral to be sent for pt via [facility].  CM sent referral as requested. Liz is not sure if the HC Unit will have a bed available for pt today, but she will follow up with MELISA to confirm.     Pt's son to provide transportation upon discharge.    1331: MELISA received confirmation from [facility] that pt can discharge to HC Unit today. Information provided:    Room:  212  Number to Call Report: [phone]  Transport: Son @ 1500    1349: Pt's discharge delayed due to pt's heart rate and cardiology consult. MELISA called Liz at [facility] to update her on the delay. CM left a VM and provided call back number. MELISA sent a message to Liz via [employer] as well. MELISA called pt's son and updated him on the delay. Pt's son did not answer the phone, so CM left a VM.    Elsie Santos LMSW,   [phone]

## 2024-07-02 NOTE — PLAN OF CARE
Problem: Discharge Planning  Goal: Discharge to home or other facility with appropriate resources  7/2/2024 1143 by Yue Mishra RN  Outcome: Adequate for Discharge  7/2/2024 1018 by Yue Mishra RN  Outcome: Progressing  7/2/2024 0053 by Angelina Nur RN  Outcome: Progressing     Problem: Safety - Adult  Goal: Free from fall injury  7/2/2024 1143 by Yue Mishra RN  Outcome: Adequate for Discharge  7/2/2024 1018 by Yue Mishra RN  Outcome: Progressing  7/2/2024 0053 by Angelina Nur RN  Outcome: Progressing     Problem: ABCDS Injury Assessment  Goal: Absence of physical injury  7/2/2024 1143 by Yue Mishra RN  Outcome: Adequate for Discharge  7/2/2024 1018 by Yue Mishra RN  Outcome: Progressing  7/2/2024 0053 by Angelina Nur RN  Outcome: Progressing     Problem: Chronic Conditions and Co-morbidities  Goal: Patient's chronic conditions and co-morbidity symptoms are monitored and maintained or improved  7/2/2024 1143 by Yue Mishra RN  Outcome: Adequate for Discharge  7/2/2024 1018 by Yue Mishra RN  Outcome: Progressing  7/2/2024 0053 by Angelina Nur RN  Outcome: Progressing     Problem: Skin/Tissue Integrity  Goal: Absence of new skin breakdown  Description: 1.  Monitor for areas of redness and/or skin breakdown  2.  Assess vascular access sites hourly  3.  Every 4-6 hours minimum:  Change oxygen saturation probe site  4.  Every 4-6 hours:  If on nasal continuous positive airway pressure, respiratory therapy assess nares and determine need for appliance change or resting period.  7/2/2024 1143 by Yue Mishra RN  Outcome: Adequate for Discharge  7/2/2024 1018 by Yue Mishra RN  Outcome: Progressing  7/2/2024 0053 by Angelina Nur RN  Outcome: Progressing

## 2024-07-02 NOTE — PROGRESS NOTES
Attempted to schedule hospital follow up PCP appointment. Office  will contact the patient with appointment information per office protocol. [facility] placed [facility] information AVS for patient resource. Pending patient discharge. Joanne Cárdenas, Care Management Assistant

## 2024-07-02 NOTE — PROGRESS NOTES
Physician Progress Note      PATIENT:               JUANIS UP  CSN #:                  [id number]  :                       1931  ADMIT DATE:       2024 12:00 PM  DISCH DATE:  RESPONDING  PROVIDER #:        Chace Ramirez MD          QUERY TEXT:    Dr Conway  Patient admitted with cellulitis non-healing ulcer/wound right foot. Noted   documentation of sepsis in progress notes under Chief Complaint on . In   order to support the diagnosis of sepsis, please include additional clinical   indicators in your documentation.  Or please document if the diagnosis of   sepsis has been ruled out after further study    The medical record reflects the following:  Risk Factors: DM, Afib  Clinical Indicators: \"Chief Complaint / Reason for Physician Visit: F/U for R   foot non healing ulcer/wound infection, Sepsis, cellulitis, ?OM, \";  PCT   negative;  lactic acid 1.7-1.29;  CRP 4.72; WBC 6.5-7.6-8.2  Treatment: Maxipime, Vancomycin,  Options provided:  -- Sepsis present as evidenced by, Please document evidence.  -- Sepsis was ruled out after study  -- Other - I will add my own diagnosis  -- Disagree - Not applicable / Not valid  -- Disagree - Clinically unable to determine / Unknown  -- Refer to Clinical Documentation Reviewer    PROVIDER RESPONSE TEXT:    Sepsis was ruled out after study.    Query created by: Mandi Mathews on 2024 4:50 PM      Electronically signed by:  Chace Ramirez MD 2024 12:04 PM

## 2024-07-02 NOTE — PLAN OF CARE
Problem: Discharge Planning  Goal: Discharge to home or other facility with appropriate resources  7/2/2024 0053 by Angelina Nur RN  Outcome: Progressing  7/1/2024 1607 by Mariah Rivera RN  Outcome: Progressing     Problem: Safety - Adult  Goal: Free from fall injury  7/2/2024 0053 by Angelina Nur RN  Outcome: Progressing  7/1/2024 1607 by Mariah Rivera RN  Outcome: Progressing     Problem: ABCDS Injury Assessment  Goal: Absence of physical injury  7/2/2024 0053 by Angelina Nur RN  Outcome: Progressing  7/1/2024 1607 by Mariah Rivera RN  Outcome: Progressing     Problem: Chronic Conditions and Co-morbidities  Goal: Patient's chronic conditions and co-morbidity symptoms are monitored and maintained or improved  7/2/2024 0053 by Angelina Nur RN  Outcome: Progressing  7/1/2024 1607 by Mariah Rivera RN  Outcome: Progressing     Problem: Skin/Tissue Integrity  Goal: Absence of new skin breakdown  Description: 1.  Monitor for areas of redness and/or skin breakdown  2.  Assess vascular access sites hourly  3.  Every 4-6 hours minimum:  Change oxygen saturation probe site  4.  Every 4-6 hours:  If on nasal continuous positive airway pressure, respiratory therapy assess nares and determine need for appliance change or resting period.  7/2/2024 0053 by Angelina Nur RN  Outcome: Progressing  7/1/2024 1607 by Mariah Rivera RN  Outcome: Progressing

## 2024-07-02 NOTE — PROGRESS NOTES
Bedside and Verbal shift change report given to KARTIK Turner (oncoming nurse) by KARTIK Alfaro (offgoing nurse). Report included the following information Nurse Handoff Report, Adult Overview, Intake/Output, MAR, Recent Results, and Cardiac Rhythm A fib .   Patient is up in chair during handover shift report and was included.

## 2024-07-02 NOTE — DISCHARGE SUMMARY
Hospitalist Discharge Summary     Patient ID:  Og Aleman  [id number]  89 y.o.  4/28/1931 6/27/2024    PCP on record: Herman Ortiz MD    Admit date: 6/27/2024  Discharge date and time: 7/2/2024    DISCHARGE DIAGNOSIS:    Right lower extremity cellulitis/history of BPH/hyperlipidemia/hypertension/history of atrial fibrillation/history of CVA    CONSULTATIONS:  IP CONSULT TO PODIATRY  IP CONSULT TO HOSPITALIST  IP CONSULT TO PHARMACY  IP CONSULT TO PODIATRY  IP WOUND CARE NURSE CONSULT TO EVAL  IP CONSULT TO CASE MANAGEMENT    Excerpted HPI from H&P of Fabienne Cao MD:  Og Aleman is a 89 y.o.  male with PMHx significant for Atrial fibrillation with Watchman device, HTN, HLD, stroke, BPH, former smoker.  He is a resident of [facility] and is independent with ADLs.     10 days ago, patient developed redness and swelling of his right foot.  He was evaluated by in-house nurse and was prescribed Keflex and Bactrim.  Despite taking the antibiotics for 10 days, if patient reports worsening of the swelling, redness and pain of his right foot.  Denies any fever or chills.  He does report nausea but no vomiting.  No chest pain, dyspnea, palpitation or syncope.  He was brought to the ED for further evaluation.     In the ED, patient was tachycardic with heart rate of 111.  Rest of his vital signs are stable.     Chest x-ray shows trace pleural effusion.  X-ray of right foot showed diffuse soft tissue swelling. Possible subtle bony erosion at the base of the first digit proximal phalanx raising concern for osteomyelitis. Consider further evaluation with MRI.  MRI of right foot pending.     Lab work shows hypokalemia 3.2.  Elevated total bilirubin of 1.2.  Thrombocytopenia 120.  Rest of the CBC, BMP and LFT within normal limits.  ESR elevated at 23.  2 sets of blood cultures were drawn.  Lactic acid 1.2.     Patient was treated as right lower extremity/foot cellulitis with possible osteomyelitis

## 2024-07-03 PROBLEM — I49.5 TACHY-BRADY SYNDROME (HCC): Status: ACTIVE | Noted: 2024-07-03

## 2024-07-03 PROBLEM — I48.21 PERMANENT ATRIAL FIBRILLATION (HCC): Status: ACTIVE | Noted: 2024-07-03

## 2024-07-03 LAB
ANION GAP SERPL CALC-SCNC: 5 MMOL/L (ref 5–15)
BUN SERPL-MCNC: 20 MG/DL (ref 6–20)
BUN/CREAT SERPL: 25 (ref 12–20)
CALCIUM SERPL-MCNC: 8.8 MG/DL (ref 8.5–10.1)
CHLORIDE SERPL-SCNC: 110 MMOL/L (ref 97–108)
CO2 SERPL-SCNC: 24 MMOL/L (ref 21–32)
CREAT SERPL-MCNC: 0.8 MG/DL (ref 0.7–1.3)
GLUCOSE BLD STRIP.AUTO-MCNC: 134 MG/DL (ref 65–117)
GLUCOSE SERPL-MCNC: 107 MG/DL (ref 65–100)
POTASSIUM SERPL-SCNC: 3.8 MMOL/L (ref 3.5–5.1)
SERVICE CMNT-IMP: ABNORMAL
SODIUM SERPL-SCNC: 139 MMOL/L (ref 136–145)

## 2024-07-03 PROCEDURE — 2580000003 HC RX 258: Performed by: INTERNAL MEDICINE

## 2024-07-03 PROCEDURE — 1100000003 HC PRIVATE W/ TELEMETRY

## 2024-07-03 PROCEDURE — 6360000002 HC RX W HCPCS: Performed by: INTERNAL MEDICINE

## 2024-07-03 PROCEDURE — 93005 ELECTROCARDIOGRAM TRACING: CPT | Performed by: STUDENT IN AN ORGANIZED HEALTH CARE EDUCATION/TRAINING PROGRAM

## 2024-07-03 PROCEDURE — 6370000000 HC RX 637 (ALT 250 FOR IP): Performed by: INTERNAL MEDICINE

## 2024-07-03 PROCEDURE — 97116 GAIT TRAINING THERAPY: CPT

## 2024-07-03 PROCEDURE — 82962 GLUCOSE BLOOD TEST: CPT

## 2024-07-03 PROCEDURE — 80048 BASIC METABOLIC PNL TOTAL CA: CPT

## 2024-07-03 PROCEDURE — 6370000000 HC RX 637 (ALT 250 FOR IP): Performed by: PODIATRIST

## 2024-07-03 PROCEDURE — 36415 COLL VENOUS BLD VENIPUNCTURE: CPT

## 2024-07-03 RX ADMIN — TRAZODONE HYDROCHLORIDE 50 MG: 50 TABLET ORAL at 20:49

## 2024-07-03 RX ADMIN — CEFEPIME 2000 MG: 2 INJECTION, POWDER, FOR SOLUTION INTRAVENOUS at 08:52

## 2024-07-03 RX ADMIN — MELATONIN 3 MG: at 20:49

## 2024-07-03 RX ADMIN — Medication: at 12:59

## 2024-07-03 RX ADMIN — PREDNISONE 10 MG: 5 TABLET ORAL at 08:49

## 2024-07-03 RX ADMIN — GENTAMICIN SULFATE: 1 CREAM TOPICAL at 09:09

## 2024-07-03 RX ADMIN — ATORVASTATIN CALCIUM 20 MG: 20 TABLET, FILM COATED ORAL at 20:49

## 2024-07-03 RX ADMIN — CEFEPIME 2000 MG: 2 INJECTION, POWDER, FOR SOLUTION INTRAVENOUS at 20:54

## 2024-07-03 RX ADMIN — ENOXAPARIN SODIUM 40 MG: 100 INJECTION SUBCUTANEOUS at 08:50

## 2024-07-03 RX ADMIN — VANCOMYCIN HYDROCHLORIDE 1000 MG: 1 INJECTION, POWDER, LYOPHILIZED, FOR SOLUTION INTRAVENOUS at 08:56

## 2024-07-03 RX ADMIN — FINASTERIDE 5 MG: 5 TABLET, FILM COATED ORAL at 08:49

## 2024-07-03 RX ADMIN — SODIUM CHLORIDE, PRESERVATIVE FREE 10 ML: 5 INJECTION INTRAVENOUS at 08:50

## 2024-07-03 RX ADMIN — SODIUM CHLORIDE, PRESERVATIVE FREE 10 ML: 5 INJECTION INTRAVENOUS at 20:49

## 2024-07-03 RX ADMIN — TAMSULOSIN HYDROCHLORIDE 0.4 MG: 0.4 CAPSULE ORAL at 08:49

## 2024-07-03 RX ADMIN — POLYETHYLENE GLYCOL 3350 17 G: 17 POWDER, FOR SOLUTION ORAL at 20:48

## 2024-07-03 RX ADMIN — ASPIRIN 81 MG: 81 TABLET, COATED ORAL at 18:01

## 2024-07-03 RX ADMIN — LISINOPRIL 20 MG: 20 TABLET ORAL at 08:49

## 2024-07-03 RX ADMIN — Medication: at 08:50

## 2024-07-03 NOTE — PROGRESS NOTES
Hospitalist Progress Note    NAME:   Og Aleman   : 1931   MRN: [id number]     Date/Time: 7/3/2024 6:29 PM  Patient PCP: Herman Ortiz MD    Estimated discharge date: 24  Barriers: EP procedure Monday, infection improvement      Assessment / Plan:    Right lower extremity cellulitis-of note patient presented with right lower extremity cellulitis, patient's MRI is negative for osteomyelitis,   wound cultures are showing Pseudomonas  blood cultures neg  Stopped vancomycin  - continue cefepime  Continue steroid taper  - will discuss cipro with pharmacy regarding qtc and patient's tachybrady syndrome  Continue wound care as per wound care recommendations  Podiatry consult appreciated, continue to follow recommendations     History of BPH-continue home medications     Hyperlipidemia-continue statin     Tachybrady syndrome  Hypertension  History of atrial fibrillation  - Cardiology consulted given bradycardia and tachycardia  - possible EP procedure Monday if infection clear however will be on antibiotics through   -continue current antihypertensive medications     History of CVA-continue medications     Prophylaxis-Lovenox  FEN-cardiac diet,  Full code        25.0 - 29.9 Overweight / Body mass index is 26.63 kg/m².     Code status: Full code  Prophylaxis: Lovenox    Subjective:     Chief Complaint / Reason for Physician Visit  \"Followup \".  Discussed with RN events overnight. No complaints today.      Objective:     VITALS:   Last 24hrs VS reviewed since prior progress note. Most recent are:  Patient Vitals for the past 24 hrs:   BP Temp Temp src Pulse Resp SpO2 Weight   24 1527 (!) 140/73 97.7 °F (36.5 °C) Axillary 75 18 99 % --   24 0720 (!) 169/94 97.5 °F (36.4 °C) Axillary 52 18 97 % --   24 0600 -- -- -- -- -- -- 81 kg (178 lb 9.2 oz)   24 0532 (!) 154/86 -- -- -- -- -- --   24 0257 (!) 145/103 (!) 96.7 °F (35.9 °C) Axillary 86 15 95 % --   24  130/73 97.3 °F (36.3 °C) Axillary 58 17 98 % --       No intake or output data in the 24 hours ending 07/03/24 1829     I had a face to face encounter and independently examined this patient on 7/3/2024, as outlined below:  PHYSICAL EXAM:  General: Alert, cooperative  EENT:  EOMI. Anicteric sclerae.  Resp:  CTA bilaterally, no wheezing or rales.  No accessory muscle use  CV:  Regular  rate,  No edema  GI:  Soft, Non distended, Non tender.  +Bowel sounds  Neurologic:  Alert and oriented X 3, normal speech,   Skin:  See wound care notes.     Reviewed most current lab test results and cultures  YES  Reviewed most current radiology test results   YES  Review and summation of old records today    NO  Reviewed patient's current orders and MAR    YES  PMH/SH reviewed - no change compared to H&P    Procedures: see electronic medical records for all procedures/Xrays and details which were not copied into this note but were reviewed prior to creation of Plan.      LABS:  I reviewed today's most current labs and imaging studies.  Pertinent labs include:  Recent Labs     07/02/24  0552   WBC 9.6   HGB 13.6   HCT 43.1   *     Recent Labs     07/01/24  0014 07/02/24  0552 07/03/24  0251    139 139   K 4.1 4.5 3.8   * 110* 110*   CO2 23 26 24   GLUCOSE 157* 104* 107*   BUN 22* 19 20   CREATININE 0.85 0.94 0.80   CALCIUM 8.9 9.3 8.8       Signed: David L Mendel, MD

## 2024-07-03 NOTE — CARE COORDINATION
Transition of Care Plan:     RUR: 11%  Prior Level of Functioning: Independent  Disposition: HC Unit at [facility]  If SNF or IPR: Date FOC offered: 7/2  Date FOC received: 7/2 (patient would like to go to [facility] HC Unit upon discharge)  Accepting facility: Patient is a resident of [facility]  Date authorization started with reference number: N/A  Follow up appointments: Defer to SNF  DME needed: Defer to SNF  Transportation at discharge: Og Cummings Jr, [phone]  IM/IMM Medicare/ letter given: Yes, 2nd IM given on 7/1  Is patient a [place] and connected with VA? N/A              If yes, was  transfer form completed and VA notified? N/A  Caregiver Contact: Og Cummings Jr, [phone]  Discharge Caregiver contacted prior to discharge? Yes, CM contacted on 7/2/24  Care Conference needed? Not at this time  Barriers to discharge:  Placement    Updated Liz at [facility] admissions of delay in discharge pending cardiology clearance. Will continue to follow.    ENA Crooks   Care Manager, [facility]  [phone]

## 2024-07-03 NOTE — CONSULTS
[facility]  [address] [place] [address]  [phone]  [email or web address]       GENERAL CARDIOLOGY CONSULTATION       Date of  Admission: 2024 12:00 PM     Admission type:Emergency   Primary Care Physician:Herman Ortiz MD     Attending Provider: Mendel, David L, MD  Cardiology Provider: Zach Cedeno  CC/REASON FOR CONSULT: Bradycardia/atrial fibrillation     Subjective:   89-year-old patient with history of stroke 4 years ago, chronic atrial fibrillation, watchman status for recurrent fall tendency, nonischemic cardiomyopathy (subsequent improvement in ejection fraction) and diabetes mellitus who is being managed in the hospital for diabetic foot ulcer/osteomyelitis    The patient was seen and examined at the bedside.  History somewhat limited although the patient denies any cardiac symptoms including chest pain, shortness of breath or passing out episodes.      Patient Active Problem List    Diagnosis Date Noted    Diabetic foot ulcer with osteomyelitis (HCC) 2024    Acute osteomyelitis of right foot (HCC) 2024    Non-pressure chronic ulcer of right lower leg, with fat layer exposed (HCC) 2021    Bilateral leg edema 2021      Herman Ortiz MD  Past Medical History:   Diagnosis Date    Arrhythmia 2017    a-fib    Hypertension     Stroke (HCC)       Social History     Socioeconomic History    Marital status: Single   Tobacco Use    Smoking status: Former     Current packs/day: 0.00     Types: Cigarettes     Quit date: 1995     Years since quittin.5    Smokeless tobacco: Never   Substance and Sexual Activity    Alcohol use: Yes     Alcohol/week: 1.0 standard drink of alcohol    Drug use: Never     Social Determinants of Health     Food Insecurity: No Food Insecurity (2024)    Hunger Vital Sign     Worried About Running Out of Food in the Last Year: Never true     Ran Out of Food in the Last  Year: Never true   Transportation Needs: No Transportation Needs (6/27/2024)    PRAPARE - Transportation     Lack of Transportation (Medical): No     Lack of Transportation (Non-Medical): No   Housing Stability: Low Risk  (6/27/2024)    Housing Stability Vital Sign     Unable to Pay for Housing in the Last Year: No     Number of Places Lived in the Last Year: 1     Unstable Housing in the Last Year: No     No Known Allergies   No family history on file.   Current Facility-Administered Medications   Medication Dose Route Frequency    gentamicin (GARAMYCIN) 0.1 % cream   Topical Daily    ammonium lactate (LAC-HYDRIN) 12 % lotion   Topical BID    aspirin EC tablet 81 mg  81 mg Oral Daily    traZODone (DESYREL) tablet 50 mg  50 mg Oral Nightly    tamsulosin (FLOMAX) capsule 0.4 mg  0.4 mg Oral Daily    methIMAzole (TAPAZOLE) tablet 5 mg  5 mg Oral Weekly    vitamin D (ERGOCALCIFEROL) capsule 50,000 Units  50,000 Units Oral Weekly    dilTIAZem injection 5 mg  5 mg IntraVENous Q4H PRN    finasteride (PROSCAR) tablet 5 mg  5 mg Oral Daily    lisinopril (PRINIVIL;ZESTRIL) tablet 20 mg  20 mg Oral Daily    sodium chloride flush 0.9 % injection 5-40 mL  5-40 mL IntraVENous 2 times per day    sodium chloride flush 0.9 % injection 5-40 mL  5-40 mL IntraVENous PRN    0.9 % sodium chloride infusion   IntraVENous PRN    enoxaparin (LOVENOX) injection 40 mg  40 mg SubCUTAneous Daily    ondansetron (ZOFRAN-ODT) disintegrating tablet 4 mg  4 mg Oral Q8H PRN    Or    ondansetron (ZOFRAN) injection 4 mg  4 mg IntraVENous Q6H PRN    polyethylene glycol (GLYCOLAX) packet 17 g  17 g Oral Daily PRN    acetaminophen (TYLENOL) tablet 650 mg  650 mg Oral Q6H PRN    Or    acetaminophen (TYLENOL) suppository 650 mg  650 mg Rectal Q6H PRN    melatonin tablet 3 mg  3 mg Oral Nightly    aluminum & magnesium hydroxide-simethicone (MAALOX) 200-200-20 MG/5ML suspension 30 mL  30 mL Oral Q6H PRN    ceFEPIme (MAXIPIME) 2,000 mg in sodium chloride 0.9 %

## 2024-07-03 NOTE — PROGRESS NOTES
Reviewed all labs.    Wound cultures grew pseudomonas.  Recommend patient be discharged with Cipro 500mg to take one bid for 10 days.  Case management orders placed.  Patient to follow with Dr. Ojeda with in 1-2 weeks of discharge at the wound clinic at [facility]

## 2024-07-03 NOTE — PROGRESS NOTES
Bedside and Verbal shift change report given to KARTIK Mendenhall (oncoming nurse) by KARTIK Alfaro (offgoing nurse). Report included the following information Nurse Handoff Report, Adult Overview, Surgery Report, Intake/Output, MAR, Recent Results, and Cardiac Rhythm Afib .   Patient's blood pressure is elevates from the 030 vital signs, covering APRN was informed. BP ws rechecked and is till elevated.

## 2024-07-03 NOTE — CONSULTS
[facility]  [address] [place], [place] [id number] [phone]  [email or web address]     PLEASE NOTE THAT WE CONFIRMED WITH THE REFERRING PHYSICIAN PRIOR TO SEEING THE PATIENT THAT THE PATIENT IS BEING REFERRED FOR INITIAL HOSPITAL EVALUATION AND FOR LONG-TERM ONGOING CARDIAC CARE.      Date of  Admission: 2024 12:00 PM     Admission type:Emergency    Og Aleman is a 89 y.o. male admitted for Diabetic foot ulcer with osteomyelitis (HCC) [E11.621, E11.69, L97.509, M86.9]  Acute osteomyelitis of right foot (HCC) [M86.171].    Asked to see regarding permanent AF with tachy-coleen syndrome. Coleen down to 40 at 8pm and tachy upto 160s today. Pt being treated for diabetic foot ulcer.      Patient Active Problem List    Diagnosis Date Noted    Permanent atrial fibrillation (HCC) 2024    Tachy-coleen syndrome (HCC) 2024    Diabetic foot ulcer with osteomyelitis (HCC) 2024    Acute osteomyelitis of right foot (HCC) 2024    Non-pressure chronic ulcer of right lower leg, with fat layer exposed (HCC) 2021    Bilateral leg edema 2021      Herman Ortiz MD  Past Medical History:   Diagnosis Date    Arrhythmia 2017    a-fib    Hypertension     Stroke (HCC)       Past Surgical History:   Procedure Laterality Date    OTHER SURGICAL HISTORY  2021    Watchman placement    PACEMAKER      UROLOGICAL  2016    bladder stone removal     No Known Allergies  Social History     Tobacco Use    Smoking status: Former     Current packs/day: 0.00     Types: Cigarettes     Quit date: 1995     Years since quittin.5    Smokeless tobacco: Never   Substance Use Topics    Alcohol use: Yes     Alcohol/week: 1.0 standard drink of alcohol    Drug use: Never     No family history on file.   Current Facility-Administered Medications   Medication Dose Route Frequency    gentamicin (GARAMYCIN) 0.1 % cream   Topical Daily    ammonium lactate (LAC-HYDRIN)  negative  Behvioral/Psych: negative  Endocrine: negative     Subjective:      Vitals:    07/03/24 0720   BP: (!) 169/94   Pulse: 52   Resp: 18   Temp: 97.5 °F (36.4 °C)   SpO2: 97%       Physical Exam:  General:  Alert, cooperative, well noursished, well developed, appears stated age   Eyes:  Sclera anicteric. Pupils equally round and reactive to light.   Mouth/Throat: Mucous membranes normal, oral pharynx clear   Neck: Supple   Lungs:   Clear to auscultation bilaterally, good effort   CV:  Irr irr, tachy,no murmur, click, rub or gallop   Abdomen:   Soft, non-tender. bowel sounds normal. non-distended   Extremities: Foot - c/d/i   Skin: Skin color, texture, turgor normal. no acute rash or lesions   Lymph nodes: Cervical and supraclavicular normal   Musculoskeletal: No swelling or deformity       Psych: Alert and oriented, normal mood affect given the setting         Cardiographics    Telemetry: afib with rvr      Labs:  Recent Labs     07/02/24  0552   WBC 9.6   HGB 13.6   HCT 43.1   *     Recent Labs     07/01/24  0014 07/02/24  0552 07/03/24  0251    139 139   K 4.1 4.5 3.8   * 110* 110*   CO2 23 26 24   BUN 22* 19 20       No results for input(s): \"CPK\", \"CKMB\" in the last 72 hours.    Invalid input(s): \"TROIQ\"    No intake or output data in the 24 hours ending 07/03/24 1222      Assessment:     Assessment:       Principal Problem:    Diabetic foot ulcer with osteomyelitis (HCC)  Active Problems:    Permanent atrial fibrillation (HCC)    Tachy-coleen syndrome (HCC)    Acute osteomyelitis of right foot (HCC)  Resolved Problems:    * No resolved hospital problems. *       Plan:     Og Aleman is a pleasant gentleman with permanent AF. He was coleen to 40 bpm yesterday. This limits our rate control med options. He is tachy upto the 160s while sitting. Hopefully, his rate control will improve as his infection clears. An invasive approach is not optimal secondary to the infection. If the infection

## 2024-07-03 NOTE — PROGRESS NOTES
0810 Pt HR elevated to 160's  on ambulatio and BP is been High , informed Dr Mendel and Dr Bocanegra about that, no new order received

## 2024-07-03 NOTE — PLAN OF CARE
Cane, Rollator, Wheelchair - Manual (transport chair)  Has the patient had two or more falls in the past year or any fall with injury in the past year?: No  ADL Assistance: Independent  Homemaking Assistance: Needs assistance  Ambulation Assistance: Independent  Transfer Assistance: Independent  Active : No  Occupation: Retired  Critical Behavior:  Orientation  Overall Orientation Status: Within Normal Limits  Orientation Level: Oriented X4  Cognition  Overall Cognitive Status: WNL    Hearing:   Hearing  Hearing: Within functional limits    Vision/Perceptual:          Vision  Vision: Impaired  Vision Exceptions: Wears glasses at all times       Strength:    Strength: Generally decreased, functional    Tone & Sensation:   Tone: Normal  Sensation: Intact    Range Of Motion:  AROM: Within functional limits       Functional Mobility:  Bed Mobility:     Bed Mobility Training  Bed Mobility Training: No (pt sitting up in chair)  Transfers:     Transfer Training  Transfer Training: Yes  Sit to Stand: Contact-guard assistance;Additional time  Stand to Sit: Contact-guard assistance  Balance:     Balance  Sitting: Intact  Standing: With support  Standing - Static: Good;Constant support  Standing - Dynamic: Good;Constant support;Fair  Ambulation/Gait Training:    Gait  Gait Training: Yes  Overall Level of Assistance: Contact-guard assistance  Distance (ft): 30 Feet (15x2)  Assistive Device: Gait belt;Walker, rolling  Interventions: Verbal cues  Speed/Alivia: Pace decreased (< 100 feet/min);Shuffled  Step Length: Left shortened;Right shortened  Gait Abnormalities: Decreased step clearance;Shuffling gait    Wheelchair Management  Wheelchair Management: No                                                                                                                                                                                                                                    Tinetti test:    Tinetti  Sitting Balance:  Steady, safe  Arises: Able without using arms  Attempts to Arise: Able, requires more than one attempt  Immediate Standing Balance (First 5 Seconds): Steady but uses walker or other support  Standing Balance: Steady but wide stance, uses cane or other support  Nudged: Begins to fall  Eyes Closed: Unsteady  Turned 360 Degrees: Steadiness: Unsteady (grabs, staggers)  Turned 360 Degrees: Continuity of Steps: Discontinuous steps  Sitting Down: Safe, smooth motion  Balance Score: 8  Initiation of Gait: No hesitancy  Step Height: R Swing Foot: Right foot does not clear floor completely with step  Step Length: R Swing Foot: Passes left stance foot  Step Height: L Swing Foot: Left foot does not clear floor completely with step  Step Length: L Swing Foot: Passes right stance foot  Step Symmetry: Right and left step appear equal  Step Continuity: Steps appear continuous  Path: Mild/moderate deviation or uses walking aid  Trunk: Marked sway or uses walking aid  Walking Time: Heels apart  Gait Score: 6  Tinetti Total Score: 14           Tinetti Tool Score Risk of Falls  <19 = High Fall Risk  19-24 = Moderate Fall Risk  25-28 = Low Fall Risk  Tinetti ME. Performance-Oriented Assessment of Mobility Problems in Elderly Patients. JAGS 1986; 34:119-126. (Scoring Description: PT Bulletin Feb. 10, 1993)    Older adults: (Ko et al, 2009; n = 1000 [place] elderly evaluated with ABC, HOANG, ADL, and IADL)  · Mean HOANG score for males aged 65-79 years = 26.21(3.40)  · Mean HOANG score for females age 65-79 years = 25.16(4.30)  · Mean HOANG score for males over 80 years = 23.29(6.02)  · Mean HOANG score for females over 80 years = 17.20(8.32)                                                                                                                                                                                                                               Pain Ratin/10     Activity Tolerance:   Fair  and elevated HR with

## 2024-07-04 LAB
ANION GAP SERPL CALC-SCNC: 5 MMOL/L (ref 5–15)
BACTERIA SPEC CULT: NORMAL
BACTERIA SPEC CULT: NORMAL
BUN SERPL-MCNC: 22 MG/DL (ref 6–20)
BUN/CREAT SERPL: 28 (ref 12–20)
CALCIUM SERPL-MCNC: 8.8 MG/DL (ref 8.5–10.1)
CHLORIDE SERPL-SCNC: 111 MMOL/L (ref 97–108)
CO2 SERPL-SCNC: 24 MMOL/L (ref 21–32)
CREAT SERPL-MCNC: 0.79 MG/DL (ref 0.7–1.3)
GLUCOSE BLD STRIP.AUTO-MCNC: 117 MG/DL (ref 65–117)
GLUCOSE BLD STRIP.AUTO-MCNC: 85 MG/DL (ref 65–117)
GLUCOSE SERPL-MCNC: 93 MG/DL (ref 65–100)
POTASSIUM SERPL-SCNC: 3.6 MMOL/L (ref 3.5–5.1)
SERVICE CMNT-IMP: NORMAL
SODIUM SERPL-SCNC: 140 MMOL/L (ref 136–145)

## 2024-07-04 PROCEDURE — 36415 COLL VENOUS BLD VENIPUNCTURE: CPT

## 2024-07-04 PROCEDURE — 6360000002 HC RX W HCPCS: Performed by: INTERNAL MEDICINE

## 2024-07-04 PROCEDURE — 93005 ELECTROCARDIOGRAM TRACING: CPT | Performed by: STUDENT IN AN ORGANIZED HEALTH CARE EDUCATION/TRAINING PROGRAM

## 2024-07-04 PROCEDURE — 6370000000 HC RX 637 (ALT 250 FOR IP): Performed by: INTERNAL MEDICINE

## 2024-07-04 PROCEDURE — 1100000003 HC PRIVATE W/ TELEMETRY

## 2024-07-04 PROCEDURE — 2580000003 HC RX 258: Performed by: INTERNAL MEDICINE

## 2024-07-04 PROCEDURE — 82962 GLUCOSE BLOOD TEST: CPT

## 2024-07-04 PROCEDURE — 6370000000 HC RX 637 (ALT 250 FOR IP): Performed by: PODIATRIST

## 2024-07-04 PROCEDURE — 80048 BASIC METABOLIC PNL TOTAL CA: CPT

## 2024-07-04 RX ADMIN — ASPIRIN 81 MG: 81 TABLET, COATED ORAL at 17:38

## 2024-07-04 RX ADMIN — CEFEPIME 2000 MG: 2 INJECTION, POWDER, FOR SOLUTION INTRAVENOUS at 21:44

## 2024-07-04 RX ADMIN — LISINOPRIL 20 MG: 20 TABLET ORAL at 09:15

## 2024-07-04 RX ADMIN — GENTAMICIN SULFATE: 1 CREAM TOPICAL at 09:21

## 2024-07-04 RX ADMIN — ENOXAPARIN SODIUM 40 MG: 100 INJECTION SUBCUTANEOUS at 09:15

## 2024-07-04 RX ADMIN — PREDNISONE 5 MG: 5 TABLET ORAL at 09:15

## 2024-07-04 RX ADMIN — ATORVASTATIN CALCIUM 20 MG: 20 TABLET, FILM COATED ORAL at 21:38

## 2024-07-04 RX ADMIN — Medication: at 13:47

## 2024-07-04 RX ADMIN — FINASTERIDE 5 MG: 5 TABLET, FILM COATED ORAL at 09:16

## 2024-07-04 RX ADMIN — TRAZODONE HYDROCHLORIDE 50 MG: 50 TABLET ORAL at 21:38

## 2024-07-04 RX ADMIN — Medication: at 09:22

## 2024-07-04 RX ADMIN — CEFEPIME 2000 MG: 2 INJECTION, POWDER, FOR SOLUTION INTRAVENOUS at 09:16

## 2024-07-04 RX ADMIN — MELATONIN 3 MG: at 21:38

## 2024-07-04 RX ADMIN — SODIUM CHLORIDE, PRESERVATIVE FREE 10 ML: 5 INJECTION INTRAVENOUS at 21:38

## 2024-07-04 RX ADMIN — TAMSULOSIN HYDROCHLORIDE 0.4 MG: 0.4 CAPSULE ORAL at 09:15

## 2024-07-04 ASSESSMENT — PAIN SCALES - GENERAL: PAINLEVEL_OUTOF10: 0

## 2024-07-04 NOTE — PROGRESS NOTES
[facility]  [address], [place] [id number] [phone]  [email or web address]     PLEASE NOTE THAT WE CONFIRMED WITH THE REFERRING PHYSICIAN PRIOR TO SEEING THE PATIENT THAT THE PATIENT IS BEING REFERRED FOR INITIAL HOSPITAL EVALUATION AND FOR LONG-TERM ONGOING CARDIAC CARE.      Date of  Admission: 2024 12:00 PM     Admission type:Emergency    Og Aleman is a 89 y.o. male admitted for Diabetic foot ulcer with osteomyelitis (HCC) [E11.621, E11.69, L97.509, M86.9]  Acute osteomyelitis of right foot (HCC) [M86.171].    Asked to see regarding permanent AF with tachy-coleen syndrome. Coleen down to 40 at 8pm and tachy upto 160s today. Pt being treated for diabetic foot ulcer.    Interim:  He continues to have rate fluctuation 70-170s in AF. Up in chair, asymptomatic. Family at bedside.     Patient Active Problem List    Diagnosis Date Noted    Permanent atrial fibrillation (HCC) 2024    Tachy-coleen syndrome (HCC) 2024    Diabetic foot ulcer with osteomyelitis (HCC) 2024    Acute osteomyelitis of right foot (HCC) 2024    Non-pressure chronic ulcer of right lower leg, with fat layer exposed (HCC) 2021    Bilateral leg edema 2021      Herman Ortiz MD  Past Medical History:   Diagnosis Date    Arrhythmia 2017    a-fib    Hypertension     Stroke (HCC)       Past Surgical History:   Procedure Laterality Date    OTHER SURGICAL HISTORY  2021    Watchman placement    PACEMAKER      UROLOGICAL  2016    bladder stone removal     No Known Allergies  Social History     Tobacco Use    Smoking status: Former     Current packs/day: 0.00     Types: Cigarettes     Quit date: 1995     Years since quittin.5    Smokeless tobacco: Never   Substance Use Topics    Alcohol use: Yes     Alcohol/week: 1.0 standard drink of alcohol    Drug use: Never     No family history on file.   Current Facility-Administered Medications

## 2024-07-04 NOTE — PROGRESS NOTES
Hospitalist Progress Note    NAME:   Og Aleman   : 1931   MRN: [id number]     Date/Time: 2024 3:59 PM  Patient PCP: Herman Ortiz MD    Estimated discharge date: 24  Barriers: EP procedure Monday, infection improvement      Assessment / Plan:    Right lower extremity cellulitis-of note patient presented with right lower extremity cellulitis, patient's MRI is negative for osteomyelitis,   wound cultures are showing Pseudomonas  blood cultures neg  Stopped vancomycin  - continue cefepime  Continue steroid taper  - patient's HR increases to 170s when he stands up per nursing, do not think it is safe for him to discharge home on abx and return for EP procedure, discussed with Cardiology   - will continue Cefepime to finish 10 day course on  (started )  Continue wound care as per wound care recommendations  Podiatry consult appreciated, continue to follow recommendations     History of BPH-continue home medications     Hyperlipidemia-continue statin     Tachybrady syndrome  Hypertension  History of atrial fibrillation  - Cardiology consulted given bradycardia and tachycardia  - possible EP procedure Monday if infection clear, will be on antibiotics through   -continue current antihypertensive medications     History of CVA-continue medications     Prophylaxis-Lovenox  FEN-cardiac diet,  Full code        25.0 - 29.9 Overweight / Body mass index is 26.63 kg/m².     Code status: Full code  Prophylaxis: Lovenox    Subjective:     Chief Complaint / Reason for Physician Visit  \"Followup \".  Discussed with RN events overnight. Feels ok today.      Objective:     VITALS:   Last 24hrs VS reviewed since prior progress note. Most recent are:  Patient Vitals for the past 24 hrs:   BP Temp Temp src Pulse Resp SpO2   24 1519 124/68 -- -- 74 -- 98 %   24 0745 (!) 159/93 97.4 °F (36.3 °C) Axillary 87 16 96 %   24 0552 (!) 157/86 97.5 °F (36.4 °C) Oral 73 16 98 %

## 2024-07-05 LAB
ANION GAP SERPL CALC-SCNC: 5 MMOL/L (ref 5–15)
BUN SERPL-MCNC: 22 MG/DL (ref 6–20)
BUN/CREAT SERPL: 28 (ref 12–20)
CALCIUM SERPL-MCNC: 8.6 MG/DL (ref 8.5–10.1)
CHLORIDE SERPL-SCNC: 111 MMOL/L (ref 97–108)
CO2 SERPL-SCNC: 26 MMOL/L (ref 21–32)
CREAT SERPL-MCNC: 0.8 MG/DL (ref 0.7–1.3)
GLUCOSE SERPL-MCNC: 102 MG/DL (ref 65–100)
POTASSIUM SERPL-SCNC: 4 MMOL/L (ref 3.5–5.1)
SODIUM SERPL-SCNC: 142 MMOL/L (ref 136–145)

## 2024-07-05 PROCEDURE — 6370000000 HC RX 637 (ALT 250 FOR IP): Performed by: STUDENT IN AN ORGANIZED HEALTH CARE EDUCATION/TRAINING PROGRAM

## 2024-07-05 PROCEDURE — 1100000003 HC PRIVATE W/ TELEMETRY

## 2024-07-05 PROCEDURE — 6370000000 HC RX 637 (ALT 250 FOR IP): Performed by: PODIATRIST

## 2024-07-05 PROCEDURE — 6360000002 HC RX W HCPCS: Performed by: INTERNAL MEDICINE

## 2024-07-05 PROCEDURE — 2580000003 HC RX 258: Performed by: INTERNAL MEDICINE

## 2024-07-05 PROCEDURE — 2500000003 HC RX 250 WO HCPCS: Performed by: INTERNAL MEDICINE

## 2024-07-05 PROCEDURE — 80048 BASIC METABOLIC PNL TOTAL CA: CPT

## 2024-07-05 PROCEDURE — 6370000000 HC RX 637 (ALT 250 FOR IP): Performed by: INTERNAL MEDICINE

## 2024-07-05 PROCEDURE — 36415 COLL VENOUS BLD VENIPUNCTURE: CPT

## 2024-07-05 RX ORDER — POLYETHYLENE GLYCOL 3350 17 G/17G
17 POWDER, FOR SOLUTION ORAL 2 TIMES DAILY
Status: DISCONTINUED | OUTPATIENT
Start: 2024-07-05 | End: 2024-07-11 | Stop reason: HOSPADM

## 2024-07-05 RX ORDER — SENNOSIDES A AND B 8.6 MG/1
2 TABLET, FILM COATED ORAL 2 TIMES DAILY
Status: DISCONTINUED | OUTPATIENT
Start: 2024-07-05 | End: 2024-07-11 | Stop reason: HOSPADM

## 2024-07-05 RX ADMIN — CEFEPIME 2000 MG: 2 INJECTION, POWDER, FOR SOLUTION INTRAVENOUS at 10:08

## 2024-07-05 RX ADMIN — TAMSULOSIN HYDROCHLORIDE 0.4 MG: 0.4 CAPSULE ORAL at 10:10

## 2024-07-05 RX ADMIN — ASPIRIN 81 MG: 81 TABLET, COATED ORAL at 18:54

## 2024-07-05 RX ADMIN — DILTIAZEM HYDROCHLORIDE 5 MG: 5 INJECTION, SOLUTION INTRAVENOUS at 09:53

## 2024-07-05 RX ADMIN — Medication: at 15:23

## 2024-07-05 RX ADMIN — TRAZODONE HYDROCHLORIDE 50 MG: 50 TABLET ORAL at 20:04

## 2024-07-05 RX ADMIN — ATORVASTATIN CALCIUM 20 MG: 20 TABLET, FILM COATED ORAL at 20:04

## 2024-07-05 RX ADMIN — SENNOSIDES 17.2 MG: 8.6 TABLET, FILM COATED ORAL at 18:54

## 2024-07-05 RX ADMIN — SODIUM CHLORIDE, PRESERVATIVE FREE 10 ML: 5 INJECTION INTRAVENOUS at 20:07

## 2024-07-05 RX ADMIN — FINASTERIDE 5 MG: 5 TABLET, FILM COATED ORAL at 10:11

## 2024-07-05 RX ADMIN — SODIUM CHLORIDE, PRESERVATIVE FREE 10 ML: 5 INJECTION INTRAVENOUS at 10:11

## 2024-07-05 RX ADMIN — POLYETHYLENE GLYCOL 3350 17 G: 17 POWDER, FOR SOLUTION ORAL at 20:05

## 2024-07-05 RX ADMIN — Medication: at 10:11

## 2024-07-05 RX ADMIN — MELATONIN 3 MG: at 20:04

## 2024-07-05 RX ADMIN — PREDNISONE 10 MG: 5 TABLET ORAL at 10:12

## 2024-07-05 RX ADMIN — GENTAMICIN SULFATE: 1 CREAM TOPICAL at 15:23

## 2024-07-05 RX ADMIN — CEFEPIME 2000 MG: 2 INJECTION, POWDER, FOR SOLUTION INTRAVENOUS at 20:12

## 2024-07-05 RX ADMIN — ENOXAPARIN SODIUM 40 MG: 100 INJECTION SUBCUTANEOUS at 10:11

## 2024-07-05 RX ADMIN — LISINOPRIL 20 MG: 20 TABLET ORAL at 10:11

## 2024-07-05 ASSESSMENT — PAIN SCALES - GENERAL
PAINLEVEL_OUTOF10: 0
PAINLEVEL_OUTOF10: 0

## 2024-07-05 NOTE — PROGRESS NOTES
Bedside and Verbal shift change report given to KARTIK Bustos (oncoming nurse) by KARTIK Alfaro (offgoing nurse). Report included the following information Nurse Handoff Report, Adult Overview, Intake/Output, MAR, Recent Results, and Cardiac Rhythm A fib.  Patient is sitting in the chair during shift change and awake.

## 2024-07-05 NOTE — PROGRESS NOTES
[facility]  [address] [place] [address]  [phone]  [email or web address]  CARDIOLOGY PROGRESS NOTE    7/5/2024 11:48 AM    Admit Date: 6/27/2024    Admit Diagnosis:   Diabetic foot ulcer with osteomyelitis (HCC) [E11.621, E11.69, L97.509, M86.9]  Acute osteomyelitis of right foot (HCC) [M86.171]    Subjective:     Og Aleman 89 YO m with osteomylitis and afib along w tachybrady syndrome. Pt denies any new complaints today. Denies any dizziness or palpitations, PND, orthopnea. Tele reviewed his HR staying in 60s with episodes of tachycardia to 160s. Flutter waves seen on tele.  Pt informed he is not able to lie flat on the bed due to his back pain.    BP (!) 148/93   Pulse 60   Temp 98.5 °F (36.9 °C) (Rectal)   Resp 16   Ht 1.676 m (5' 6\")   Wt 81 kg (178 lb 9.2 oz)   SpO2 99%   BMI 28.82 kg/m²     Current Facility-Administered Medications   Medication Dose Route Frequency    gentamicin (GARAMYCIN) 0.1 % cream   Topical Daily    ammonium lactate (LAC-HYDRIN) 12 % lotion   Topical BID    aspirin EC tablet 81 mg  81 mg Oral Daily    traZODone (DESYREL) tablet 50 mg  50 mg Oral Nightly    tamsulosin (FLOMAX) capsule 0.4 mg  0.4 mg Oral Daily    methIMAzole (TAPAZOLE) tablet 5 mg  5 mg Oral Weekly    vitamin D (ERGOCALCIFEROL) capsule 50,000 Units  50,000 Units Oral Weekly    dilTIAZem injection 5 mg  5 mg IntraVENous Q4H PRN    finasteride (PROSCAR) tablet 5 mg  5 mg Oral Daily    lisinopril (PRINIVIL;ZESTRIL) tablet 20 mg  20 mg Oral Daily    sodium chloride flush 0.9 % injection 5-40 mL  5-40 mL IntraVENous 2 times per day    sodium chloride flush 0.9 % injection 5-40 mL  5-40 mL IntraVENous PRN    0.9 % sodium chloride infusion   IntraVENous PRN    enoxaparin (LOVENOX) injection 40 mg  40 mg SubCUTAneous Daily    ondansetron (ZOFRAN-ODT) disintegrating tablet 4 mg  4 mg Oral Q8H PRN    Or    ondansetron (ZOFRAN) injection 4 mg  4 mg IntraVENous

## 2024-07-05 NOTE — PROGRESS NOTES
Comprehensive Nutrition Assessment    Type and Reason for Visit:  Reassess    Nutrition Recommendations/Plan:   Continue current diet and supplement     Malnutrition Assessment:  Malnutrition Status:  No malnutrition (07/05/24 1131)      Nutrition Assessment:    Chart reviewed; patient continues on a cardiac diet. Reports a good appetite and eating well. Denies any recent weight changes. Suspects current weight of 178# is inaccurate; reports a UBW closer to 165#. He likes the ensure plus high protein and would like to continue it daily. No nutrition questions or concerns reported. Encouraged intake of meals.     Nutrition Related Findings:    Labs reviewed   BM 7/4   Atorvastatin, Prednisone, Vitamin D   Wound Type: Skin Tears       Current Nutrition Intake & Therapies:    Average Meal Intake: 51-75%  Average Supplements Intake: %  ADULT DIET; Regular; Low Fat/Low Chol/High Fiber/2 gm Na  ADULT ORAL NUTRITION SUPPLEMENT; Breakfast; Standard High Calorie/High Protein Oral Supplement  Diet NPO    Anthropometric Measures:  Height: 167.6 cm (5' 6\")  Ideal Body Weight (IBW): 142 lbs (65 kg)       Current Body Weight: 81 kg (178 lb 9.2 oz),   IBW. Weight Source: Bed Scale  Current BMI (kg/m2): 28.8  Usual Body Weight: 74.8 kg (165 lb)  % Weight Change (Calculated): 8.2                    BMI Categories: Overweight (BMI 25.0-29.9)    Estimated Daily Nutrient Needs:  Energy Requirements Based On: Formula  Weight Used for Energy Requirements: Usual  Energy (kcal/day): 1739 kcals (BMR x 1.3AF)  Weight Used for Protein Requirements: Usual  Protein (g/day): 75-90g (1.0-1.2 g/kg bw)  Method Used for Fluid Requirements: 1 ml/kcal  Fluid (ml/day): 1750 mL    Nutrition Diagnosis:   No nutrition diagnosis at this time    Nutrition Interventions:   Food and/or Nutrient Delivery: Continue Current Diet, Continue Oral Nutrition Supplement  Nutrition Education/Counseling: No recommendation at this time  Coordination of Nutrition  Care: Continue to monitor while inpatient       Goals:  Previous Goal Met: Goal(s) Achieved  Goals: PO intake 50% or greater, PO intake 75% or greater, by next RD assessment       Nutrition Monitoring and Evaluation:   Behavioral-Environmental Outcomes: None Identified  Food/Nutrient Intake Outcomes: Food and Nutrient Intake, Supplement Intake  Physical Signs/Symptoms Outcomes: Biochemical Data, Weight    Discharge Planning:    Continue current diet, Continue Oral Nutrition Supplement     Vanda Hines RD  Contact: ext [phone]

## 2024-07-05 NOTE — PROGRESS NOTES
Hospitalist Progress Note    NAME:   Og Aleman   : 1931   MRN: [id number]     Date/Time: 2024 5:31 PM  Patient PCP: Herman Ortiz MD    Estimated discharge date: 24  Barriers: EP procedure Monday, infection improvement      Assessment / Plan:    Right lower extremity cellulitis-of note patient presented with right lower extremity cellulitis, patient's MRI is negative for osteomyelitis,   wound cultures are showing Pseudomonas  blood cultures neg  Stopped vancomycin  - continue cefepime  Continue steroid taper  - patient's HR increases to 170s when he stands up per nursing, do not think it is safe for him to discharge home on abx and return for EP procedure, discussed with Cardiology   - will continue Cefepime to finish 10 day course on  (started )  Continue wound care as per wound care recommendations  Podiatry consult appreciated, continue to follow recommendations     History of BPH-continue home medications    Constipation  - increased bowel regimen     Hyperlipidemia-continue statin     Tachybrady syndrome  Hypertension  History of atrial fibrillation  - Cardiology consulted given bradycardia and tachycardia  - possible EP procedure Monday if infection clear, will be on antibiotics through   -continue current antihypertensive medications     History of CVA-continue medications     Prophylaxis-Lovenox  FEN-cardiac diet,  Full code        25.0 - 29.9 Overweight / Body mass index is 26.63 kg/m².     Code status: Full code  Prophylaxis: Lovenox    Subjective:     Chief Complaint / Reason for Physician Visit  \"Followup \".  Discussed with RN events overnight. No complaints today.      Objective:     VITALS:   Last 24hrs VS reviewed since prior progress note. Most recent are:  Patient Vitals for the past 24 hrs:   BP Temp Temp src Pulse Resp SpO2   24 1544 131/69 97.3 °F (36.3 °C) -- 77 19 98 %   24 0915 -- 98.5 °F (36.9 °C) Rectal -- -- --   24 0830 (!)  148/93 -- -- 60 16 99 %   07/05/24 0446 (!) 147/80 97.7 °F (36.5 °C) Axillary 62 17 97 %   07/05/24 0008 (!) 157/97 -- -- 90 -- 98 %   07/04/24 2136 (!) 162/84 97.4 °F (36.3 °C) Oral 88 17 98 %           Intake/Output Summary (Last 24 hours) at 7/5/2024 1731  Last data filed at 7/5/2024 0959  Gross per 24 hour   Intake 340 ml   Output 1 ml   Net 339 ml        I had a face to face encounter and independently examined this patient on 7/5/2024, as outlined below:  PHYSICAL EXAM:  General: Alert, cooperative  EENT:  EOMI. Anicteric sclerae.  Resp:  CTA bilaterally, no wheezing or rales.  No accessory muscle use  CV:  Regular  rate,  No edema  GI:  Soft, Non distended, Non tender.  +Bowel sounds  Neurologic:  Alert and oriented X 3, normal speech,   Skin:  See wound care notes.     Reviewed most current lab test results and cultures  YES  Reviewed most current radiology test results   YES  Review and summation of old records today    NO  Reviewed patient's current orders and MAR    YES  PMH/SH reviewed - no change compared to H&P    Procedures: see electronic medical records for all procedures/Xrays and details which were not copied into this note but were reviewed prior to creation of Plan.      LABS:  I reviewed today's most current labs and imaging studies.  Pertinent labs include:  No results for input(s): \"WBC\", \"HGB\", \"HCT\", \"PLT\" in the last 72 hours.    Recent Labs     07/03/24  0251 07/04/24  0556 07/05/24  0435    140 142   K 3.8 3.6 4.0   * 111* 111*   CO2 24 24 26   GLUCOSE 107* 93 102*   BUN 20 22* 22*   CREATININE 0.80 0.79 0.80   CALCIUM 8.8 8.8 8.6         Signed: David L Mendel, MD

## 2024-07-05 NOTE — CARE COORDINATION
Transition of Care Plan:     RUR: 11%  Prior Level of Functioning: Independent  Disposition: HC Unit at [facility]  If SNF or IPR: Date FOC offered: 7/2  Date FOC received: 7/2 (patient would like to go to [facility] HC Unit upon discharge)  Accepting facility: Patient is a resident of [facility]  Date authorization started with reference number: N/A  Follow up appointments: Defer to SNF  DME needed: Defer to SNF  Transportation at discharge: Og Cummings Jr, [phone]  IM/IMM Medicare/ letter given: Yes, 2nd IM given on 7/1  Is patient a [place] and connected with [facility]? N/A              If yes, was  transfer form completed and [facility] notified? N/A  Caregiver Contact: Og Cummings Jr, [phone]  Discharge Caregiver contacted prior to discharge? Yes, CM contacted on 7/2/24  Care Conference needed? Not at this time  Barriers to discharge:  cardiology clearance    Chart reviewed. CM continuing to follow for discharge planning. Pt not yet medically stable for d/c at this time pending cardiology clearance and potential for procedure on Monday 7/8. Updated Liz in admissions at [facility] this AM. Will continue to follow.    ENA Crooks   Care Manager, [facility]  [phone]

## 2024-07-06 LAB
ANION GAP SERPL CALC-SCNC: 6 MMOL/L (ref 5–15)
BUN SERPL-MCNC: 21 MG/DL (ref 6–20)
BUN/CREAT SERPL: 30 (ref 12–20)
CALCIUM SERPL-MCNC: 8.5 MG/DL (ref 8.5–10.1)
CHLORIDE SERPL-SCNC: 111 MMOL/L (ref 97–108)
CO2 SERPL-SCNC: 24 MMOL/L (ref 21–32)
CREAT SERPL-MCNC: 0.69 MG/DL (ref 0.7–1.3)
GLUCOSE SERPL-MCNC: 99 MG/DL (ref 65–100)
POTASSIUM SERPL-SCNC: 3.9 MMOL/L (ref 3.5–5.1)
SODIUM SERPL-SCNC: 141 MMOL/L (ref 136–145)

## 2024-07-06 PROCEDURE — 1100000003 HC PRIVATE W/ TELEMETRY

## 2024-07-06 PROCEDURE — 6360000002 HC RX W HCPCS: Performed by: INTERNAL MEDICINE

## 2024-07-06 PROCEDURE — 6370000000 HC RX 637 (ALT 250 FOR IP): Performed by: INTERNAL MEDICINE

## 2024-07-06 PROCEDURE — 80048 BASIC METABOLIC PNL TOTAL CA: CPT

## 2024-07-06 PROCEDURE — 2500000003 HC RX 250 WO HCPCS: Performed by: INTERNAL MEDICINE

## 2024-07-06 PROCEDURE — 36415 COLL VENOUS BLD VENIPUNCTURE: CPT

## 2024-07-06 PROCEDURE — 6370000000 HC RX 637 (ALT 250 FOR IP): Performed by: STUDENT IN AN ORGANIZED HEALTH CARE EDUCATION/TRAINING PROGRAM

## 2024-07-06 PROCEDURE — 2580000003 HC RX 258: Performed by: INTERNAL MEDICINE

## 2024-07-06 PROCEDURE — 6370000000 HC RX 637 (ALT 250 FOR IP): Performed by: PODIATRIST

## 2024-07-06 RX ADMIN — SODIUM CHLORIDE, PRESERVATIVE FREE 10 ML: 5 INJECTION INTRAVENOUS at 20:56

## 2024-07-06 RX ADMIN — Medication: at 09:55

## 2024-07-06 RX ADMIN — PREDNISONE 5 MG: 5 TABLET ORAL at 09:49

## 2024-07-06 RX ADMIN — POLYETHYLENE GLYCOL 3350 17 G: 17 POWDER, FOR SOLUTION ORAL at 21:07

## 2024-07-06 RX ADMIN — TRAZODONE HYDROCHLORIDE 50 MG: 50 TABLET ORAL at 20:56

## 2024-07-06 RX ADMIN — ASPIRIN 81 MG: 81 TABLET, COATED ORAL at 18:51

## 2024-07-06 RX ADMIN — POLYETHYLENE GLYCOL 3350 17 G: 17 POWDER, FOR SOLUTION ORAL at 09:50

## 2024-07-06 RX ADMIN — DILTIAZEM HYDROCHLORIDE 5 MG: 5 INJECTION, SOLUTION INTRAVENOUS at 08:40

## 2024-07-06 RX ADMIN — SENNOSIDES 17.2 MG: 8.6 TABLET, FILM COATED ORAL at 20:56

## 2024-07-06 RX ADMIN — GENTAMICIN SULFATE: 1 CREAM TOPICAL at 09:56

## 2024-07-06 RX ADMIN — ATORVASTATIN CALCIUM 20 MG: 20 TABLET, FILM COATED ORAL at 20:56

## 2024-07-06 RX ADMIN — LISINOPRIL 20 MG: 20 TABLET ORAL at 09:50

## 2024-07-06 RX ADMIN — SENNOSIDES 17.2 MG: 8.6 TABLET, FILM COATED ORAL at 09:48

## 2024-07-06 RX ADMIN — SODIUM CHLORIDE, PRESERVATIVE FREE 10 ML: 5 INJECTION INTRAVENOUS at 09:50

## 2024-07-06 RX ADMIN — CEFEPIME 2000 MG: 2 INJECTION, POWDER, FOR SOLUTION INTRAVENOUS at 20:59

## 2024-07-06 RX ADMIN — MELATONIN 3 MG: at 21:07

## 2024-07-06 RX ADMIN — FINASTERIDE 5 MG: 5 TABLET, FILM COATED ORAL at 09:48

## 2024-07-06 RX ADMIN — Medication: at 14:26

## 2024-07-06 RX ADMIN — CEFEPIME 2000 MG: 2 INJECTION, POWDER, FOR SOLUTION INTRAVENOUS at 09:54

## 2024-07-06 RX ADMIN — TAMSULOSIN HYDROCHLORIDE 0.4 MG: 0.4 CAPSULE ORAL at 09:49

## 2024-07-06 RX ADMIN — ENOXAPARIN SODIUM 40 MG: 100 INJECTION SUBCUTANEOUS at 09:50

## 2024-07-06 ASSESSMENT — PAIN SCALES - GENERAL
PAINLEVEL_OUTOF10: 0

## 2024-07-06 NOTE — PROGRESS NOTES
End of Shift Note    Bedside shift change report given to Yue VERDUGO (oncoming nurse) by Ganga Mirza RN (offgoing nurse).  Report included the following information SBAR, Kardex, Procedure Summary, Intake/Output, MAR, Recent Results, and Cardiac Rhythm A fib, ST    Shift worked:  nights     Shift summary and any significant changes:     none     Concerns for physician to address:  none     Zone phone for oncoming shift:   1156       Activity:     Number times ambulated in hallways past shift: 0  Number of times OOB to chair past shift: 0    Cardiac:   Cardiac Monitoring: Yes           Access:  Current line(s): PIV     Genitourinary:   Urinary status: voiding    Respiratory:      Chronic home O2 use?: NO  Incentive spirometer at bedside: YES       GI:     Current diet:  ADULT DIET; Regular; Low Fat/Low Chol/High Fiber/2 gm Na  ADULT ORAL NUTRITION SUPPLEMENT; Breakfast; Standard High Calorie/High Protein Oral Supplement  Diet NPO  Passing flatus: YES  Tolerating current diet: YES       Pain Management:   Patient states pain is manageable on current regimen: YES    Skin:     Interventions: float heels and increase time out of bed    Patient Safety:  Fall Score:    Interventions: bed/chair alarm       Length of Stay:  Expected LOS: 12  Actual LOS: 9      Ganga Mirza RN

## 2024-07-06 NOTE — PROGRESS NOTES
Hospitalist Progress Note    NAME:   Og Aleman   : 1931   MRN: [id number]     Date/Time: 2024 3:10 PM  Patient PCP: Herman Ortiz MD    Estimated discharge date: 24  Barriers: EP procedure Monday, infection improvement      Assessment / Plan:    Right lower extremity cellulitis-of note patient presented with right lower extremity cellulitis, patient's MRI is negative for osteomyelitis,   wound cultures are showing Pseudomonas  blood cultures neg  Stopped vancomycin  - continue cefepime  Continue steroid taper  - patient's HR increases to 170s when he stands up per nursing, do not think it is safe for him to discharge home on abx and return for EP procedure, discussed with Cardiology   - will continue Cefepime to finish 10 day course on  (started )  Continue wound care as per wound care recommendations  Podiatry consult appreciated, continue to follow recommendations     History of BPH-continue home medications    Constipation  - increased bowel regimen     Hyperlipidemia-continue statin     Tachybrady syndrome  Hypertension  History of atrial fibrillation  - Cardiology consulted given bradycardia and tachycardia  - possible EP procedure Monday if infection clear, will be on antibiotics through   -continue current antihypertensive medications     History of CVA-continue medications     Prophylaxis-Lovenox  FEN-cardiac diet,  Full code        25.0 - 29.9 Overweight / Body mass index is 26.63 kg/m².     Code status: Full code  Prophylaxis: Lovenox    Subjective:     Chief Complaint / Reason for Physician Visit  \"Followup \".  Discussed with RN events overnight. Feels ok today.      Objective:     VITALS:   Last 24hrs VS reviewed since prior progress note. Most recent are:  Patient Vitals for the past 24 hrs:   BP Temp Temp src Pulse Resp SpO2 Weight   24 0950 136/69 -- -- -- -- -- --   24 0817 (!) 163/80 97.3 °F (36.3 °C) Axillary 89 18 96 % --   24 0600

## 2024-07-06 NOTE — PLAN OF CARE
Problem: Chronic Conditions and Co-morbidities  Goal: Patient's chronic conditions and co-morbidity symptoms are monitored and maintained or improved  7/5/2024 2141 by Ganga Mirza, RN  Outcome: Progressing  7/5/2024 2024 by Akash Lundberg, RN  Outcome: Progressing

## 2024-07-07 LAB
ANION GAP SERPL CALC-SCNC: 4 MMOL/L (ref 5–15)
BASOPHILS # BLD: 0.1 K/UL (ref 0–0.1)
BASOPHILS NFR BLD: 1 % (ref 0–1)
BUN SERPL-MCNC: 22 MG/DL (ref 6–20)
BUN/CREAT SERPL: 26 (ref 12–20)
CALCIUM SERPL-MCNC: 8.8 MG/DL (ref 8.5–10.1)
CHLORIDE SERPL-SCNC: 110 MMOL/L (ref 97–108)
CO2 SERPL-SCNC: 28 MMOL/L (ref 21–32)
CREAT SERPL-MCNC: 0.85 MG/DL (ref 0.7–1.3)
DIFFERENTIAL METHOD BLD: ABNORMAL
EKG DIAGNOSIS: NORMAL
EKG DIAGNOSIS: NORMAL
EKG Q-T INTERVAL: 346 MS
EKG Q-T INTERVAL: 358 MS
EKG QRS DURATION: 86 MS
EKG QRS DURATION: 92 MS
EKG QTC CALCULATION (BAZETT): 468 MS
EKG QTC CALCULATION (BAZETT): 477 MS
EKG R AXIS: 15 DEGREES
EKG R AXIS: 16 DEGREES
EKG T AXIS: -15 DEGREES
EKG T AXIS: 11 DEGREES
EKG VENTRICULAR RATE: 107 BPM
EKG VENTRICULAR RATE: 110 BPM
EOSINOPHIL # BLD: 0.3 K/UL (ref 0–0.4)
EOSINOPHIL NFR BLD: 2 % (ref 0–7)
ERYTHROCYTE [DISTWIDTH] IN BLOOD BY AUTOMATED COUNT: 14.2 % (ref 11.5–14.5)
GLUCOSE SERPL-MCNC: 129 MG/DL (ref 65–100)
HCT VFR BLD AUTO: 42.1 % (ref 36.6–50.3)
HGB BLD-MCNC: 13.6 G/DL (ref 12.1–17)
IMM GRANULOCYTES # BLD AUTO: 0 K/UL (ref 0–0.04)
IMM GRANULOCYTES NFR BLD AUTO: 0 % (ref 0–0.5)
LYMPHOCYTES # BLD: 4.9 K/UL (ref 0.8–3.5)
LYMPHOCYTES NFR BLD: 41 % (ref 12–49)
MAGNESIUM SERPL-MCNC: 2.3 MG/DL (ref 1.6–2.4)
MCH RBC QN AUTO: 31.9 PG (ref 26–34)
MCHC RBC AUTO-ENTMCNC: 32.3 G/DL (ref 30–36.5)
MCV RBC AUTO: 98.8 FL (ref 80–99)
MONOCYTES # BLD: 1.2 K/UL (ref 0–1)
MONOCYTES NFR BLD: 10 % (ref 5–13)
NEUTS SEG # BLD: 5.5 K/UL (ref 1.8–8)
NEUTS SEG NFR BLD: 46 % (ref 32–75)
NRBC # BLD: 0 K/UL (ref 0–0.01)
NRBC BLD-RTO: 0 PER 100 WBC
PHOSPHATE SERPL-MCNC: 2.8 MG/DL (ref 2.6–4.7)
PLATELET # BLD AUTO: 148 K/UL (ref 150–400)
PMV BLD AUTO: 12.3 FL (ref 8.9–12.9)
POTASSIUM SERPL-SCNC: 4 MMOL/L (ref 3.5–5.1)
RBC # BLD AUTO: 4.26 M/UL (ref 4.1–5.7)
SODIUM SERPL-SCNC: 142 MMOL/L (ref 136–145)
WBC # BLD AUTO: 11.9 K/UL (ref 4.1–11.1)

## 2024-07-07 PROCEDURE — 6360000002 HC RX W HCPCS: Performed by: STUDENT IN AN ORGANIZED HEALTH CARE EDUCATION/TRAINING PROGRAM

## 2024-07-07 PROCEDURE — 1100000003 HC PRIVATE W/ TELEMETRY

## 2024-07-07 PROCEDURE — 6370000000 HC RX 637 (ALT 250 FOR IP): Performed by: INTERNAL MEDICINE

## 2024-07-07 PROCEDURE — 6370000000 HC RX 637 (ALT 250 FOR IP): Performed by: PODIATRIST

## 2024-07-07 PROCEDURE — 2580000003 HC RX 258: Performed by: STUDENT IN AN ORGANIZED HEALTH CARE EDUCATION/TRAINING PROGRAM

## 2024-07-07 PROCEDURE — 6360000002 HC RX W HCPCS: Performed by: INTERNAL MEDICINE

## 2024-07-07 PROCEDURE — 85025 COMPLETE CBC W/AUTO DIFF WBC: CPT

## 2024-07-07 PROCEDURE — 6370000000 HC RX 637 (ALT 250 FOR IP): Performed by: STUDENT IN AN ORGANIZED HEALTH CARE EDUCATION/TRAINING PROGRAM

## 2024-07-07 PROCEDURE — 84100 ASSAY OF PHOSPHORUS: CPT

## 2024-07-07 PROCEDURE — 80048 BASIC METABOLIC PNL TOTAL CA: CPT

## 2024-07-07 PROCEDURE — 36415 COLL VENOUS BLD VENIPUNCTURE: CPT

## 2024-07-07 PROCEDURE — 2580000003 HC RX 258: Performed by: INTERNAL MEDICINE

## 2024-07-07 PROCEDURE — 83735 ASSAY OF MAGNESIUM: CPT

## 2024-07-07 RX ADMIN — POLYETHYLENE GLYCOL 3350 17 G: 17 POWDER, FOR SOLUTION ORAL at 20:23

## 2024-07-07 RX ADMIN — Medication: at 11:25

## 2024-07-07 RX ADMIN — FINASTERIDE 5 MG: 5 TABLET, FILM COATED ORAL at 11:12

## 2024-07-07 RX ADMIN — SENNOSIDES 17.2 MG: 8.6 TABLET, FILM COATED ORAL at 11:20

## 2024-07-07 RX ADMIN — LISINOPRIL 20 MG: 20 TABLET ORAL at 11:12

## 2024-07-07 RX ADMIN — POLYETHYLENE GLYCOL 3350 17 G: 17 POWDER, FOR SOLUTION ORAL at 11:12

## 2024-07-07 RX ADMIN — CEFEPIME 2000 MG: 2 INJECTION, POWDER, FOR SOLUTION INTRAVENOUS at 11:11

## 2024-07-07 RX ADMIN — ENOXAPARIN SODIUM 40 MG: 100 INJECTION SUBCUTANEOUS at 11:11

## 2024-07-07 RX ADMIN — ATORVASTATIN CALCIUM 20 MG: 20 TABLET, FILM COATED ORAL at 20:22

## 2024-07-07 RX ADMIN — ERGOCALCIFEROL 50000 UNITS: 1.25 CAPSULE ORAL at 11:11

## 2024-07-07 RX ADMIN — SODIUM CHLORIDE, PRESERVATIVE FREE 10 ML: 5 INJECTION INTRAVENOUS at 11:13

## 2024-07-07 RX ADMIN — SODIUM CHLORIDE, PRESERVATIVE FREE 10 ML: 5 INJECTION INTRAVENOUS at 20:23

## 2024-07-07 RX ADMIN — SENNOSIDES 17.2 MG: 8.6 TABLET, FILM COATED ORAL at 20:23

## 2024-07-07 RX ADMIN — TRAZODONE HYDROCHLORIDE 50 MG: 50 TABLET ORAL at 20:23

## 2024-07-07 RX ADMIN — GENTAMICIN SULFATE: 1 CREAM TOPICAL at 13:34

## 2024-07-07 RX ADMIN — TAMSULOSIN HYDROCHLORIDE 0.4 MG: 0.4 CAPSULE ORAL at 11:11

## 2024-07-07 RX ADMIN — ASPIRIN 81 MG: 81 TABLET, COATED ORAL at 18:55

## 2024-07-07 RX ADMIN — Medication: at 15:06

## 2024-07-07 RX ADMIN — CEFEPIME 2000 MG: 2 INJECTION, POWDER, FOR SOLUTION INTRAVENOUS at 23:12

## 2024-07-07 RX ADMIN — METHIMAZOLE 5 MG: 5 TABLET ORAL at 11:11

## 2024-07-07 RX ADMIN — MELATONIN 3 MG: at 20:22

## 2024-07-07 RX ADMIN — PREDNISONE 5 MG: 5 TABLET ORAL at 11:14

## 2024-07-07 ASSESSMENT — PAIN SCALES - GENERAL: PAINLEVEL_OUTOF10: 0

## 2024-07-07 NOTE — PROGRESS NOTES
Hospitalist Progress Note    NAME:   Og Aleman   : 1931   MRN: [id number]     Date/Time: 2024 2:41 PM  Patient PCP: Herman Ortiz MD    Estimated discharge date: 24  Barriers: EP procedure Monday, infection improvement      Assessment / Plan:    Right lower extremity cellulitis-of note patient presented with right lower extremity cellulitis, patient's MRI is negative for osteomyelitis,   wound cultures are showing Pseudomonas  blood cultures neg  Stopped vancomycin  - continue cefepime  Continue steroid taper  - patient's HR increases to 170s when he stands up per nursing, do not think it is safe for him to discharge home on abx and return for EP procedure, discussed with Cardiology   - Cefepime finishing 10 day course on  (started )  Continue wound care as per wound care recommendations  Podiatry consult appreciated, continue to follow recommendations     History of BPH-continue home medications    Constipation  - increased bowel regimen     Hyperlipidemia-continue statin     Tachybrady syndrome  Hypertension  History of atrial fibrillation  - Cardiology consulted given bradycardia and tachycardia  - possible EP procedure Monday if infection clear, will be on antibiotics through   -continue current antihypertensive medications     History of CVA-continue medications     Prophylaxis-Lovenox  FEN-cardiac diet,  Full code        25.0 - 29.9 Overweight / Body mass index is 26.63 kg/m².     Code status: Full code  Prophylaxis: Lovenox    Subjective:     Chief Complaint / Reason for Physician Visit  \"Followup \".  Discussed with RN events overnight. No complaints today.      Objective:     VITALS:   Last 24hrs VS reviewed since prior progress note. Most recent are:  Patient Vitals for the past 24 hrs:   BP Temp Temp src Pulse Resp SpO2 Weight   24 0800 (!) 145/79 97.4 °F (36.3 °C) Oral (!) 104 16 93 % --   24 0620 (!) 151/82 97.4 °F (36.3 °C) Oral 82 16 97 % --

## 2024-07-07 NOTE — PROGRESS NOTES
Bedside and Verbal shift change report given to kartik Bustos (oncoming nurse) by KARTIK Alfaro (offgoing nurse). Report included the following information Nurse Handoff Report, Adult Overview, Intake/Output, MAR, and Cardiac Rhythm Afib .

## 2024-07-07 NOTE — PLAN OF CARE
Problem: Chronic Conditions and Co-morbidities  Goal: Patient's chronic conditions and co-morbidity symptoms are monitored and maintained or improved  7/6/2024 2311 by Angelina Nur, RN  Outcome: Progressing  7/6/2024 1151 by Micaela Wright, RN  Outcome: Progressing

## 2024-07-08 ENCOUNTER — ANESTHESIA EVENT (OUTPATIENT)
Facility: HOSPITAL | Age: 89
End: 2024-07-08
Payer: MEDICARE

## 2024-07-08 ENCOUNTER — ANESTHESIA (OUTPATIENT)
Facility: HOSPITAL | Age: 89
End: 2024-07-08
Payer: MEDICARE

## 2024-07-08 ENCOUNTER — APPOINTMENT (OUTPATIENT)
Facility: HOSPITAL | Age: 89
End: 2024-07-08
Attending: HOSPITALIST
Payer: MEDICARE

## 2024-07-08 ENCOUNTER — APPOINTMENT (OUTPATIENT)
Facility: HOSPITAL | Age: 89
End: 2024-07-08
Payer: MEDICARE

## 2024-07-08 LAB
ANION GAP SERPL CALC-SCNC: 6 MMOL/L (ref 5–15)
ANION GAP SERPL CALC-SCNC: 6 MMOL/L (ref 5–15)
ARTERIAL PATENCY WRIST A: ABNORMAL
BASE DEFICIT BLDA-SCNC: 3.7 MMOL/L
BASOPHILS # BLD: 0.1 K/UL (ref 0–0.1)
BASOPHILS NFR BLD: 1 % (ref 0–1)
BDY SITE: ABNORMAL
BUN SERPL-MCNC: 18 MG/DL (ref 6–20)
BUN SERPL-MCNC: 20 MG/DL (ref 6–20)
BUN/CREAT SERPL: 17 (ref 12–20)
BUN/CREAT SERPL: 23 (ref 12–20)
CALCIUM SERPL-MCNC: 9.4 MG/DL (ref 8.5–10.1)
CALCIUM SERPL-MCNC: 9.4 MG/DL (ref 8.5–10.1)
CHLORIDE SERPL-SCNC: 107 MMOL/L (ref 97–108)
CHLORIDE SERPL-SCNC: 109 MMOL/L (ref 97–108)
CO2 SERPL-SCNC: 25 MMOL/L (ref 21–32)
CO2 SERPL-SCNC: 25 MMOL/L (ref 21–32)
CREAT SERPL-MCNC: 0.86 MG/DL (ref 0.7–1.3)
CREAT SERPL-MCNC: 1.07 MG/DL (ref 0.7–1.3)
DIFFERENTIAL METHOD BLD: ABNORMAL
ECHO AO ROOT DIAM: 3 CM
ECHO AO ROOT INDEX: 1.6 CM/M2
ECHO AR MAX VEL PISA: 3.4 M/S
ECHO AV AREA PEAK VELOCITY: 1.9 CM2
ECHO AV AREA/BSA PEAK VELOCITY: 1 CM2/M2
ECHO AV PEAK GRADIENT: 14 MMHG
ECHO AV PEAK VELOCITY: 1.8 M/S
ECHO AV REGURGITANT PHT: 441.8 MILLISECOND
ECHO AV VELOCITY RATIO: 0.61
ECHO BSA: 1.87 M2
ECHO BSA: 1.9 M2
ECHO LA DIAMETER INDEX: 1.76 CM/M2
ECHO LA DIAMETER: 3.3 CM
ECHO LA TO AORTIC ROOT RATIO: 1.1
ECHO LA VOL A-L A2C: 85 ML (ref 18–58)
ECHO LA VOL A-L A4C: 75 ML (ref 18–58)
ECHO LA VOL MOD A2C: 80 ML (ref 18–58)
ECHO LA VOL MOD A4C: 71 ML (ref 18–58)
ECHO LA VOLUME AREA LENGTH: 83 ML
ECHO LA VOLUME INDEX A-L A2C: 45 ML/M2 (ref 16–34)
ECHO LA VOLUME INDEX A-L A4C: 40 ML/M2 (ref 16–34)
ECHO LA VOLUME INDEX AREA LENGTH: 44 ML/M2 (ref 16–34)
ECHO LA VOLUME INDEX MOD A2C: 43 ML/M2 (ref 16–34)
ECHO LA VOLUME INDEX MOD A4C: 38 ML/M2 (ref 16–34)
ECHO LV E' SEPTAL VELOCITY: 6 CM/S
ECHO LV EDV A4C: 111 ML
ECHO LV EDV INDEX A4C: 59 ML/M2
ECHO LV EJECTION FRACTION A4C: 54 %
ECHO LV ESV A4C: 52 ML
ECHO LV ESV INDEX A4C: 28 ML/M2
ECHO LV FRACTIONAL SHORTENING: 22 % (ref 28–44)
ECHO LV INTERNAL DIMENSION DIASTOLE INDEX: 2.19 CM/M2
ECHO LV INTERNAL DIMENSION DIASTOLIC: 4.1 CM (ref 4.2–5.9)
ECHO LV INTERNAL DIMENSION SYSTOLIC INDEX: 1.71 CM/M2
ECHO LV INTERNAL DIMENSION SYSTOLIC: 3.2 CM
ECHO LV IVSD: 1.9 CM (ref 0.6–1)
ECHO LV MASS 2D: 294.3 G (ref 88–224)
ECHO LV MASS INDEX 2D: 157.4 G/M2 (ref 49–115)
ECHO LV POSTERIOR WALL DIASTOLIC: 1.5 CM (ref 0.6–1)
ECHO LV RELATIVE WALL THICKNESS RATIO: 0.73
ECHO LVOT AREA: 3.1 CM2
ECHO LVOT DIAM: 2 CM
ECHO LVOT PEAK GRADIENT: 5 MMHG
ECHO LVOT PEAK VELOCITY: 1.1 M/S
ECHO RA VOLUME: 65 ML
ECHO RA VOLUME: 67 ML
ECHO RV TAPSE: 1.2 CM (ref 1.7–?)
ECHO TV REGURGITANT MAX VELOCITY: 3.34 M/S
ECHO TV REGURGITANT PEAK GRADIENT: 45 MMHG
EOSINOPHIL # BLD: 0.2 K/UL (ref 0–0.4)
EOSINOPHIL NFR BLD: 2 % (ref 0–7)
ERYTHROCYTE [DISTWIDTH] IN BLOOD BY AUTOMATED COUNT: 14.1 % (ref 11.5–14.5)
FIO2 ON VENT: 70 %
GLUCOSE SERPL-MCNC: 200 MG/DL (ref 65–100)
GLUCOSE SERPL-MCNC: 97 MG/DL (ref 65–100)
HCO3 BLDA-SCNC: 21 MMOL/L (ref 22–26)
HCT VFR BLD AUTO: 46 % (ref 36.6–50.3)
HCT VFR BLD AUTO: 51.4 % (ref 36.6–50.3)
HCT VFR BLD AUTO: 54.3 % (ref 36.6–50.3)
HGB BLD-MCNC: 14.8 G/DL (ref 12.1–17)
HGB BLD-MCNC: 16.1 G/DL (ref 12.1–17)
HGB BLD-MCNC: 17 G/DL (ref 12.1–17)
IMM GRANULOCYTES # BLD AUTO: 0 K/UL (ref 0–0.04)
IMM GRANULOCYTES NFR BLD AUTO: 0 % (ref 0–0.5)
IPAP/PIP: 12
LACTATE SERPL-SCNC: 2.3 MMOL/L (ref 0.4–2)
LACTATE SERPL-SCNC: 3.9 MMOL/L (ref 0.4–2)
LYMPHOCYTES # BLD: 5.2 K/UL (ref 0.8–3.5)
LYMPHOCYTES NFR BLD: 45 % (ref 12–49)
MAGNESIUM SERPL-MCNC: 2.3 MG/DL (ref 1.6–2.4)
MCH RBC QN AUTO: 31.6 PG (ref 26–34)
MCH RBC QN AUTO: 31.7 PG (ref 26–34)
MCH RBC QN AUTO: 31.8 PG (ref 26–34)
MCHC RBC AUTO-ENTMCNC: 31.3 G/DL (ref 30–36.5)
MCHC RBC AUTO-ENTMCNC: 31.3 G/DL (ref 30–36.5)
MCHC RBC AUTO-ENTMCNC: 32.2 G/DL (ref 30–36.5)
MCV RBC AUTO: 101 FL (ref 80–99)
MCV RBC AUTO: 101.7 FL (ref 80–99)
MCV RBC AUTO: 98.5 FL (ref 80–99)
MONOCYTES # BLD: 0.8 K/UL (ref 0–1)
MONOCYTES NFR BLD: 7 % (ref 5–13)
NEUTS SEG # BLD: 5.2 K/UL (ref 1.8–8)
NEUTS SEG NFR BLD: 45 % (ref 32–75)
NRBC # BLD: 0 K/UL (ref 0–0.01)
NRBC BLD-RTO: 0 PER 100 WBC
PCO2 BLDA: 39 MMHG (ref 35–45)
PEEP RESPIRATORY: 6
PH BLDA: 7.36 (ref 7.35–7.45)
PHOSPHATE SERPL-MCNC: 3.1 MG/DL (ref 2.6–4.7)
PLATELET # BLD AUTO: 146 K/UL (ref 150–400)
PLATELET # BLD AUTO: 158 K/UL (ref 150–400)
PLATELET # BLD AUTO: 175 K/UL (ref 150–400)
PMV BLD AUTO: 12.3 FL (ref 8.9–12.9)
PMV BLD AUTO: 12.5 FL (ref 8.9–12.9)
PMV BLD AUTO: 12.8 FL (ref 8.9–12.9)
PO2 BLDA: 104 MMHG (ref 80–100)
POTASSIUM SERPL-SCNC: 4.2 MMOL/L (ref 3.5–5.1)
POTASSIUM SERPL-SCNC: 4.6 MMOL/L (ref 3.5–5.1)
RBC # BLD AUTO: 4.67 M/UL (ref 4.1–5.7)
RBC # BLD AUTO: 5.09 M/UL (ref 4.1–5.7)
RBC # BLD AUTO: 5.34 M/UL (ref 4.1–5.7)
RBC MORPH BLD: ABNORMAL
SAO2 % BLD: 98 % (ref 92–97)
SAO2% DEVICE SAO2% SENSOR NAME: ABNORMAL
SODIUM SERPL-SCNC: 138 MMOL/L (ref 136–145)
SODIUM SERPL-SCNC: 140 MMOL/L (ref 136–145)
SPECIMEN SITE: ABNORMAL
TROPONIN I SERPL HS-MCNC: 3243 NG/L (ref 0–76)
TROPONIN I SERPL HS-MCNC: 4345 NG/L (ref 0–76)
VENTILATION MODE VENT: ABNORMAL
WBC # BLD AUTO: 11.5 K/UL (ref 4.1–11.1)
WBC # BLD AUTO: 14.7 K/UL (ref 4.1–11.1)
WBC # BLD AUTO: 19 K/UL (ref 4.1–11.1)
WBC MORPH BLD: ABNORMAL

## 2024-07-08 PROCEDURE — 6370000000 HC RX 637 (ALT 250 FOR IP): Performed by: HOSPITALIST

## 2024-07-08 PROCEDURE — 82803 BLOOD GASES ANY COMBINATION: CPT

## 2024-07-08 PROCEDURE — C1732 CATH, EP, DIAG/ABL, 3D/VECT: HCPCS | Performed by: INTERNAL MEDICINE

## 2024-07-08 PROCEDURE — 93005 ELECTROCARDIOGRAM TRACING: CPT | Performed by: STUDENT IN AN ORGANIZED HEALTH CARE EDUCATION/TRAINING PROGRAM

## 2024-07-08 PROCEDURE — 83605 ASSAY OF LACTIC ACID: CPT

## 2024-07-08 PROCEDURE — 85027 COMPLETE CBC AUTOMATED: CPT

## 2024-07-08 PROCEDURE — 2580000003 HC RX 258: Performed by: INTERNAL MEDICINE

## 2024-07-08 PROCEDURE — 6370000000 HC RX 637 (ALT 250 FOR IP): Performed by: STUDENT IN AN ORGANIZED HEALTH CARE EDUCATION/TRAINING PROGRAM

## 2024-07-08 PROCEDURE — 3700000001 HC ADD 15 MINUTES (ANESTHESIA): Performed by: INTERNAL MEDICINE

## 2024-07-08 PROCEDURE — 2580000003 HC RX 258: Performed by: HOSPITALIST

## 2024-07-08 PROCEDURE — 2580000003 HC RX 258

## 2024-07-08 PROCEDURE — 6360000002 HC RX W HCPCS

## 2024-07-08 PROCEDURE — 93650 ICAR CATH ABLTJ AV NODE FUNC: CPT | Performed by: INTERNAL MEDICINE

## 2024-07-08 PROCEDURE — 36415 COLL VENOUS BLD VENIPUNCTURE: CPT

## 2024-07-08 PROCEDURE — C1894 INTRO/SHEATH, NON-LASER: HCPCS | Performed by: INTERNAL MEDICINE

## 2024-07-08 PROCEDURE — 2500000003 HC RX 250 WO HCPCS: Performed by: HOSPITALIST

## 2024-07-08 PROCEDURE — 2500000003 HC RX 250 WO HCPCS: Performed by: INTERNAL MEDICINE

## 2024-07-08 PROCEDURE — 2720000010 HC SURG SUPPLY STERILE: Performed by: INTERNAL MEDICINE

## 2024-07-08 PROCEDURE — 93306 TTE W/DOPPLER COMPLETE: CPT

## 2024-07-08 PROCEDURE — C1713 ANCHOR/SCREW BN/BN,TIS/BN: HCPCS | Performed by: INTERNAL MEDICINE

## 2024-07-08 PROCEDURE — 6360000002 HC RX W HCPCS: Performed by: HOSPITALIST

## 2024-07-08 PROCEDURE — 5A09357 ASSISTANCE WITH RESPIRATORY VENTILATION, LESS THAN 24 CONSECUTIVE HOURS, CONTINUOUS POSITIVE AIRWAY PRESSURE: ICD-10-PCS | Performed by: HOSPITALIST

## 2024-07-08 PROCEDURE — 6370000000 HC RX 637 (ALT 250 FOR IP): Performed by: INTERNAL MEDICINE

## 2024-07-08 PROCEDURE — 02HK3NZ INSERTION OF INTRACARDIAC PACEMAKER INTO RIGHT VENTRICLE, PERCUTANEOUS APPROACH: ICD-10-PCS | Performed by: INTERNAL MEDICINE

## 2024-07-08 PROCEDURE — 71045 X-RAY EXAM CHEST 1 VIEW: CPT

## 2024-07-08 PROCEDURE — 6360000002 HC RX W HCPCS: Performed by: STUDENT IN AN ORGANIZED HEALTH CARE EDUCATION/TRAINING PROGRAM

## 2024-07-08 PROCEDURE — 3700000000 HC ANESTHESIA ATTENDED CARE: Performed by: INTERNAL MEDICINE

## 2024-07-08 PROCEDURE — 51702 INSERT TEMP BLADDER CATH: CPT

## 2024-07-08 PROCEDURE — 83735 ASSAY OF MAGNESIUM: CPT

## 2024-07-08 PROCEDURE — 2709999900 HC NON-CHARGEABLE SUPPLY: Performed by: INTERNAL MEDICINE

## 2024-07-08 PROCEDURE — 33274 TCAT INSJ/RPL PERM LDLS PM: CPT | Performed by: INTERNAL MEDICINE

## 2024-07-08 PROCEDURE — 94660 CPAP INITIATION&MGMT: CPT

## 2024-07-08 PROCEDURE — 84484 ASSAY OF TROPONIN QUANT: CPT

## 2024-07-08 PROCEDURE — C1766 INTRO/SHEATH,STRBLE,NON-PEEL: HCPCS | Performed by: INTERNAL MEDICINE

## 2024-07-08 PROCEDURE — 85025 COMPLETE CBC W/AUTO DIFF WBC: CPT

## 2024-07-08 PROCEDURE — 2000000000 HC ICU R&B

## 2024-07-08 PROCEDURE — 84100 ASSAY OF PHOSPHORUS: CPT

## 2024-07-08 PROCEDURE — 02583ZZ DESTRUCTION OF CONDUCTION MECHANISM, PERCUTANEOUS APPROACH: ICD-10-PCS | Performed by: INTERNAL MEDICINE

## 2024-07-08 PROCEDURE — 36600 WITHDRAWAL OF ARTERIAL BLOOD: CPT

## 2024-07-08 PROCEDURE — C1786 PMKR, SINGLE, RATE-RESP: HCPCS | Performed by: INTERNAL MEDICINE

## 2024-07-08 PROCEDURE — 80048 BASIC METABOLIC PNL TOTAL CA: CPT

## 2024-07-08 PROCEDURE — 2500000003 HC RX 250 WO HCPCS

## 2024-07-08 PROCEDURE — C1769 GUIDE WIRE: HCPCS | Performed by: INTERNAL MEDICINE

## 2024-07-08 DEVICE — TPS MC1VR01US MICRA US SCALABLE
Type: IMPLANTABLE DEVICE | Status: FUNCTIONAL
Brand: MICRA™

## 2024-07-08 RX ORDER — FENTANYL CITRATE 50 UG/ML
INJECTION, SOLUTION INTRAMUSCULAR; INTRAVENOUS PRN
Status: DISCONTINUED | OUTPATIENT
Start: 2024-07-08 | End: 2024-07-08 | Stop reason: SDUPTHER

## 2024-07-08 RX ORDER — PROPOFOL 10 MG/ML
INJECTION, EMULSION INTRAVENOUS CONTINUOUS PRN
Status: DISCONTINUED | OUTPATIENT
Start: 2024-07-08 | End: 2024-07-08 | Stop reason: SDUPTHER

## 2024-07-08 RX ORDER — FUROSEMIDE 10 MG/ML
80 INJECTION INTRAMUSCULAR; INTRAVENOUS ONCE
Status: COMPLETED | OUTPATIENT
Start: 2024-07-08 | End: 2024-07-08

## 2024-07-08 RX ORDER — SODIUM CHLORIDE 0.9 % (FLUSH) 0.9 %
5-40 SYRINGE (ML) INJECTION PRN
Status: DISCONTINUED | OUTPATIENT
Start: 2024-07-08 | End: 2024-07-11 | Stop reason: HOSPADM

## 2024-07-08 RX ORDER — DEXMEDETOMIDINE HYDROCHLORIDE 4 UG/ML
.1-1.5 INJECTION, SOLUTION INTRAVENOUS CONTINUOUS
Status: DISCONTINUED | OUTPATIENT
Start: 2024-07-08 | End: 2024-07-10 | Stop reason: HOSPADM

## 2024-07-08 RX ORDER — LISINOPRIL 5 MG/1
5 TABLET ORAL DAILY
Status: DISCONTINUED | OUTPATIENT
Start: 2024-07-09 | End: 2024-07-11 | Stop reason: HOSPADM

## 2024-07-08 RX ORDER — HEPARIN SODIUM 1000 [USP'U]/ML
INJECTION, SOLUTION INTRAVENOUS; SUBCUTANEOUS PRN
Status: DISCONTINUED | OUTPATIENT
Start: 2024-07-08 | End: 2024-07-08 | Stop reason: SDUPTHER

## 2024-07-08 RX ORDER — SODIUM CHLORIDE 0.9 % (FLUSH) 0.9 %
5-40 SYRINGE (ML) INJECTION EVERY 12 HOURS SCHEDULED
Status: DISCONTINUED | OUTPATIENT
Start: 2024-07-08 | End: 2024-07-11 | Stop reason: HOSPADM

## 2024-07-08 RX ORDER — FUROSEMIDE 10 MG/ML
40 INJECTION INTRAMUSCULAR; INTRAVENOUS ONCE
Status: COMPLETED | OUTPATIENT
Start: 2024-07-08 | End: 2024-07-08

## 2024-07-08 RX ORDER — PHENYLEPHRINE HCL IN 0.9% NACL 0.4MG/10ML
SYRINGE (ML) INTRAVENOUS PRN
Status: DISCONTINUED | OUTPATIENT
Start: 2024-07-08 | End: 2024-07-08 | Stop reason: SDUPTHER

## 2024-07-08 RX ORDER — CASTOR OIL AND BALSAM, PERU 788; 87 MG/G; MG/G
OINTMENT TOPICAL 2 TIMES DAILY
Status: DISCONTINUED | OUTPATIENT
Start: 2024-07-08 | End: 2024-07-11 | Stop reason: HOSPADM

## 2024-07-08 RX ORDER — SODIUM CHLORIDE 9 MG/ML
INJECTION, SOLUTION INTRAVENOUS PRN
Status: DISCONTINUED | OUTPATIENT
Start: 2024-07-08 | End: 2024-07-11 | Stop reason: HOSPADM

## 2024-07-08 RX ORDER — EPHEDRINE SULFATE 50 MG/ML
INJECTION INTRAVENOUS PRN
Status: DISCONTINUED | OUTPATIENT
Start: 2024-07-08 | End: 2024-07-08 | Stop reason: SDUPTHER

## 2024-07-08 RX ORDER — ACETAMINOPHEN 325 MG/1
650 TABLET ORAL EVERY 4 HOURS PRN
Status: DISCONTINUED | OUTPATIENT
Start: 2024-07-08 | End: 2024-07-09

## 2024-07-08 RX ADMIN — VANCOMYCIN HYDROCHLORIDE 1000 MG: 1 INJECTION, POWDER, LYOPHILIZED, FOR SOLUTION INTRAVENOUS at 08:01

## 2024-07-08 RX ADMIN — Medication: at 21:30

## 2024-07-08 RX ADMIN — PROPOFOL 75 MCG/KG/MIN: 10 INJECTION, EMULSION INTRAVENOUS at 07:55

## 2024-07-08 RX ADMIN — FUROSEMIDE 40 MG: 10 INJECTION, SOLUTION INTRAMUSCULAR; INTRAVENOUS at 12:25

## 2024-07-08 RX ADMIN — PHENYLEPHRINE HYDROCHLORIDE 25 MCG/MIN: 10 INJECTION INTRAVENOUS at 08:07

## 2024-07-08 RX ADMIN — FENTANYL CITRATE 12.5 MCG: 50 INJECTION, SOLUTION INTRAMUSCULAR; INTRAVENOUS at 07:55

## 2024-07-08 RX ADMIN — MELATONIN 3 MG: at 20:09

## 2024-07-08 RX ADMIN — SODIUM CHLORIDE, PRESERVATIVE FREE 10 ML: 5 INJECTION INTRAVENOUS at 21:30

## 2024-07-08 RX ADMIN — ATORVASTATIN CALCIUM 20 MG: 20 TABLET, FILM COATED ORAL at 20:06

## 2024-07-08 RX ADMIN — POLYETHYLENE GLYCOL 3350 17 G: 17 POWDER, FOR SOLUTION ORAL at 20:06

## 2024-07-08 RX ADMIN — Medication: at 13:38

## 2024-07-08 RX ADMIN — FENTANYL CITRATE 12.5 MCG: 50 INJECTION, SOLUTION INTRAMUSCULAR; INTRAVENOUS at 08:19

## 2024-07-08 RX ADMIN — FUROSEMIDE 40 MG: 10 INJECTION, SOLUTION INTRAMUSCULAR; INTRAVENOUS at 12:03

## 2024-07-08 RX ADMIN — DEXMEDETOMIDINE HYDROCHLORIDE 0.2 MCG/KG/HR: 100 INJECTION, SOLUTION INTRAVENOUS at 13:47

## 2024-07-08 RX ADMIN — FUROSEMIDE 80 MG: 10 INJECTION, SOLUTION INTRAMUSCULAR; INTRAVENOUS at 20:07

## 2024-07-08 RX ADMIN — DEXMEDETOMIDINE HYDROCHLORIDE 0.2 MCG/KG/HR: 100 INJECTION, SOLUTION INTRAVENOUS at 20:19

## 2024-07-08 RX ADMIN — ASPIRIN 81 MG: 81 TABLET, COATED ORAL at 20:05

## 2024-07-08 RX ADMIN — FENTANYL CITRATE 12.5 MCG: 50 INJECTION, SOLUTION INTRAMUSCULAR; INTRAVENOUS at 08:36

## 2024-07-08 RX ADMIN — SODIUM CHLORIDE: 9 INJECTION, SOLUTION INTRAVENOUS at 07:54

## 2024-07-08 RX ADMIN — EPHEDRINE SULFATE 10 MG: 50 INJECTION INTRAVENOUS at 08:31

## 2024-07-08 RX ADMIN — TRAZODONE HYDROCHLORIDE 50 MG: 50 TABLET ORAL at 20:09

## 2024-07-08 RX ADMIN — Medication 40 MCG: at 08:23

## 2024-07-08 RX ADMIN — SENNOSIDES 17.2 MG: 8.6 TABLET, FILM COATED ORAL at 20:06

## 2024-07-08 RX ADMIN — Medication: at 13:44

## 2024-07-08 RX ADMIN — HEPARIN SODIUM 3000 UNITS: 1000 INJECTION, SOLUTION INTRAVENOUS; SUBCUTANEOUS at 08:14

## 2024-07-08 RX ADMIN — FENTANYL CITRATE 12.5 MCG: 50 INJECTION, SOLUTION INTRAMUSCULAR; INTRAVENOUS at 08:03

## 2024-07-08 ASSESSMENT — PAIN SCALES - GENERAL
PAINLEVEL_OUTOF10: 0

## 2024-07-08 NOTE — PLAN OF CARE
Problem: Chronic Conditions and Co-morbidities  Goal: Patient's chronic conditions and co-morbidity symptoms are monitored and maintained or improved  Outcome: Progressing  Flowsheets (Taken 7/8/2024 1044)  Care Plan - Patient's Chronic Conditions and Co-Morbidity Symptoms are Monitored and Maintained or Improved:   Monitor and assess patient's chronic conditions and comorbid symptoms for stability, deterioration, or improvement   Collaborate with multidisciplinary team to address chronic and comorbid conditions and prevent exacerbation or deterioration   Update acute care plan with appropriate goals if chronic or comorbid symptoms are exacerbated and prevent overall improvement and discharge

## 2024-07-08 NOTE — PROGRESS NOTES
Predictive Model Details          44 (Caution)  Factor Value    Calculated 7/8/2024 10:50 31% Age 89 years old    Deterioration Index Model 20% Neurological exam X     18% Respiratory rate 22     9% Cardiac rhythm Ventricular paced     7% Pulse oximetry 90 %     7% Systolic 158     5% WBC count abnormal (11.5 K/uL)     2% Potassium 4.2 mmol/L     0% Hematocrit 46.0 %     0% Sodium 140 mmol/L     0% Temperature 97.7 °F (36.5 °C)     0% Pulse 75

## 2024-07-08 NOTE — PROGRESS NOTES
Rapid response called for severe work of breathing and course audible rhonchi without use of stethoscope at 1156. Arrived at 1157, Patient was on 6 L at entry, asked RT for NRB. Sats are in low 80s. Dr. Mendel at bedside and further orders for Lasix, stat PCXR noted. Sats did improve to 88% with NRB. Son at bedside. Asked for possible life support and patient is not opposed. 1218 Will get labs and now adding BIPAP.  1220 Pulling at BIPAP often, MD at bedside. To move to CCU for Precedex and preventative care.   1250Transferred with BIPAP and RT, RN and monitor to 2516 without incident. Placed into bed and CCU monitoring initiated. See notes from CCU RN's.

## 2024-07-08 NOTE — PROGRESS NOTES
1100  Patient complained of increased congestion and unable to cough anything up and over all uncomfortable. Unable to elevate HOB d/t ablation. Put patient in reverse trendelenburg.     1151  Patient had increased work of breathing and congestion. Notified MD. Called RT for nebs and to assess patient     1155  RRT called. RR increased to 30-40. Patient had increased congestion and rhonchi audible at bedside.

## 2024-07-08 NOTE — PROGRESS NOTES
TRANSFER - IN REPORT:    Verbal report received from wang on Og Alemna  being received from  for [facility]. Report consisted of patient’s Situation, Background, Assessment and Recommendations(SBAR). Information from the following report(s) PROCEDURE LOG was reviewed with the receiving clinician. Opportunity for questions and clarification was provided. Assessment completed upon patient’s arrival to Cardiac Cath Lab RECOVERY AREA and care assumed.       Cardiac Cath Lab Recovery Arrival Note:    Og Aleman arrived to [facility] recovery area.  Patient procedure= AVN ABL/LEADLESS PACER. Patient on cardiac monitor, non-invasive blood pressure, SPO2 monitor. On 3L NC. Patient status doing well without problems. Patient is A&Ox 4. Patient reports NO PAIN.    PROCEDURE SITE CHECK:    Procedure site:without any bleeding and NO HEMATOMA,  No change in patient status.

## 2024-07-08 NOTE — PROGRESS NOTES
New Lactic acid and Troponin levels reported to Dr. Pan. No new orders received verbally. NAVIN Grant made aware.

## 2024-07-08 NOTE — PROGRESS NOTES
Cardiology Progress Note       [facility]  [address] [place] [address]  [phone]  [email or web address]     7/8/2024 8:32 AM    Admit Date: 6/27/2024    Admit Diagnosis: Diabetic foot ulcer with osteomyelitis (HCC) [E11.621, E11.69, L97.509, M86.9]  Acute osteomyelitis of right foot (HCC) [M86.171]    Subjective:     Og Aleman   denies chest pain.    BP (!) 153/78   Pulse 60   Temp 97.5 °F (36.4 °C) (Axillary)   Resp 16   Ht 1.676 m (5' 6\")   Wt 77.9 kg (171 lb 11.8 oz)   SpO2 95%   BMI 27.72 kg/m²   Current Facility-Administered Medications   Medication Dose Route Frequency    sodium chloride flush 0.9 % injection 5-40 mL  5-40 mL IntraVENous 2 times per day    sodium chloride flush 0.9 % injection 5-40 mL  5-40 mL IntraVENous PRN    0.9 % sodium chloride infusion   IntraVENous PRN    acetaminophen (TYLENOL) tablet 650 mg  650 mg Oral Q4H PRN    isoproterenol (ISUPREL) 1 mg in sodium chloride 0.9 % 250 mL infusion    Continuous PRN    polyethylene glycol (GLYCOLAX) packet 17 g  17 g Oral BID    senna (SENOKOT) tablet 17.2 mg  2 tablet Oral BID    gentamicin (GARAMYCIN) 0.1 % cream   Topical Daily    ammonium lactate (LAC-HYDRIN) 12 % lotion   Topical BID    aspirin EC tablet 81 mg  81 mg Oral Daily    traZODone (DESYREL) tablet 50 mg  50 mg Oral Nightly    tamsulosin (FLOMAX) capsule 0.4 mg  0.4 mg Oral Daily    methIMAzole (TAPAZOLE) tablet 5 mg  5 mg Oral Weekly    vitamin D (ERGOCALCIFEROL) capsule 50,000 Units  50,000 Units Oral Weekly    dilTIAZem injection 5 mg  5 mg IntraVENous Q4H PRN    finasteride (PROSCAR) tablet 5 mg  5 mg Oral Daily    lisinopril (PRINIVIL;ZESTRIL) tablet 20 mg  20 mg Oral Daily    0.9 % sodium chloride infusion   IntraVENous PRN    ondansetron (ZOFRAN-ODT) disintegrating tablet 4 mg  4 mg Oral Q8H PRN    Or    ondansetron (ZOFRAN) injection 4 mg  4 mg IntraVENous Q6H PRN    acetaminophen (TYLENOL) tablet 650 mg  650 mg

## 2024-07-08 NOTE — PROGRESS NOTES
Flash pulm edema post av node abl/ppm.    V paced 75 bpm and normotensive. BB has been dc/d    Echo demonstrates lvef 45-50%, no effusion    Cont supportive care - d/w son at bedside    Thank you for allowing me to participate in this patients care.    Cal Tobar MD, FACC, RS

## 2024-07-08 NOTE — H&P
[facility] HPI.      Name: Og Aleman   : 1931   MRN: [id number]   Date: 2024      Chief Complaint   Patient presents with    Foot Pain     Patient having right sided foot pain for 10 days.  Went to doctor and placed on oral antibiotics for right foot infection.  Here for increased pain and redness to right foot. Purulent drainage noted to right foot.       Reason for ICU:     Flash pulmonary edema needing NIPPV, at risk of intubation     HPI & Hospital course     89-year-old male with PMH significant for stroke 4 years ago, chronic atrial fibrillation s/p watchman status for recurrent fall tendency, NICM (subsequent improvement in ejection fraction) and diabetes mellitus.     He was admitted 24 for sepsis 2/2 skin and soft tissue infection involving right foot, subsequent wound cultures grew Pseudomonas but blood cultures were negative. He has completed Abx therapy. He was evaluated by cardiology for ongoing tachy-coleen episodes and finally had AV melanie ablation & PPM insertion today 24 early AM.     Raid response was called for sudden worsening of SOB. CXR showed diffuse pul edema. On evaluation, patient is unable to speak. Started on BiPAP. 80 mg IV lasix given. BP on higher side and on exam he has scral edema.     Assessment & Plan     # Acute respiratory failure with hypoxemia needing NIPPV and at risk of intubation & mechanical ventilation.   # Acute decompensated heart failure, shortly after AV melanie ablation and permamant pacemaker insertion.  - NO recent TTE   - Has mild to moderate edema in dependant areas.   - H/o chronic atrial fibrillation s/p watchman status for recurrent fall tendency, NICM (subsequent improvement in ejection fraction)   Plan  - BiPAP for now, OK to intubate if worsens   - Aggressive diuresis. Will dose next after watching response to 80 of IV lasix given already  - Stat TTE once respiratory status improve    Code status: full code     NOK son

## 2024-07-08 NOTE — PROGRESS NOTES
Hospitalist Progress Note    NAME:   Og Aleman   : 1931   MRN: [id number]     Date/Time: 2024 4:11 PM  Patient PCP: Herman Ortiz MD    Transferred to ICU 24 for respiratory failure    Assessment / Plan:    Acute hypoxic respiratory failure  Patient developed acute respiratory failure with pulmonary edema 24 after his procedure earlier today. Gave lasix however persistently hypoxic and with increased work of breathing. Discussed code status with patient and family, remains full code. Placed on bipap and ordered lasix. CXR showing bilateral pulmonary edema, likely flash pulmonary edema after procedure. Consulted and transferred to ICU, appreciate assistance    Right lower extremity cellulitis-of note patient presented with right lower extremity cellulitis, patient's MRI is negative for osteomyelitis,   wound cultures are showing Pseudomonas  blood cultures neg  Stopped vancomycin  - continue cefepime  Continue steroid taper  - patient's HR increases to 170s when he stands up per nursing, do not think it is safe for him to discharge home on abx and return for EP procedure, discussed with Cardiology   - s/p Cefepime finishing 10 day course on  (started )  Continue wound care as per wound care recommendations  Podiatry consult appreciated, continue to follow recommendations     History of BPH-continue home medications    Constipation  - increased bowel regimen     Hyperlipidemia-continue statin     Tachybrady syndrome  Hypertension  History of atrial fibrillation  - Cardiology consulted given bradycardia and tachycardia  - underwent pacemaker placement and ablation    -continue current antihypertensive medications     History of CVA-continue medications     Prophylaxis-Lovenox  FEN-cardiac diet,  Full code        25.0 - 29.9 Overweight / Body mass index is 26.63 kg/m².     Code status: Full code  Prophylaxis: Lovenox    Subjective:     Chief Complaint / Reason for Physician

## 2024-07-08 NOTE — PROGRESS NOTES
1250: Pt arrived to CCU on BiPAP. Pt A&Ox4 with no c/o pain or discomfort at this time. Coarse lung sounds. Lopez inserted per MD; 150cc yellow/clear urine obtained. VSS. V-paced on monitor. EKG obtained and transmitted per MD. Lactic, CBC, BMP, troponin sent per MD. See flowsheets/eMAR for details.   1350: Critical result - lactic 3.9, troponin 4345. MD aware.   1400: Echo tech at bedside.   1600: Reassessment complete - no acute change.   1745: Lactic/troponin sent per Dr. Pan. RT at bedside to obtain ABG. Dr. Pan at bedside and updated on pt condition.   1750: Pt transitioned from bipap to NC; VSS.   1900: Bedside shift report given to KARTIK Harding.

## 2024-07-08 NOTE — ANESTHESIA PRE PROCEDURE
Department of Anesthesiology  Preprocedure Note       Name:  Og Aleman   Age:  89 y.o.  :  1931                                          MRN:  [id number]         Date:  2024      Surgeon: Surgeon(s):  Cal Tobar MD    Procedure: Procedure(s):  Insert or replace transcath PPM leadless  Ablation AV node    Medications prior to admission:   Prior to Admission medications    Medication Sig Start Date End Date Taking? Authorizing Provider   aluminum & magnesium hydroxide-simethicone (MAALOX) 200-200-20 MG/5ML SUSP suspension Take 30 mLs by mouth every 6 hours as needed for Indigestion 24  Yes Chace Sampson MD   ammonium lactate (LAC-HYDRIN) 12 % lotion Apply topically as needed. 24  Yes Chace Sampson MD   dilTIAZem (CARDIZEM 12 HR) 60 MG extended release capsule Take 1 capsule by mouth 2 times daily 24  Yes Chace Sampson MD   gentamicin (GARAMYCIN) 0.1 % cream Apply topically 3 times daily. 7/3/24  Yes Chace Sampson MD   melatonin 3 MG TABS tablet Take 1 tablet by mouth nightly 24  Yes Chace Sampson MD   predniSONE (DELTASONE) 5 MG tablet Take 1 tablet by mouth daily for 3 doses 24 Yes Chace Sampson MD   levoFLOXacin (LEVAQUIN) 750 MG tablet Take 1 tablet by mouth every 48 hours for 14 days 24 Yes Chace Sampson MD   aspirin 81 MG EC tablet Take 1 tablet by mouth daily   Yes Hemalatha Sidhu MD   vitamin D (ERGOCALCIFEROL) 1.25 MG (66108 UT) CAPS capsule Take 1 capsule by mouth once a week    Yes Hemalatha Sidhu MD   tamsulosin (FLOMAX) 0.4 MG capsule Take 1 capsule by mouth daily   Yes Hemalatha Sidhu MD   traZODone (DESYREL) 50 MG tablet Take 1 tablet by mouth nightly   Yes Hemalatha Sidhu MD   methIMAzole (TAPAZOLE) 5 MG tablet Take 1 tablet by mouth once a week    Yes Provider, MD Hemalatha   atorvastatin (LIPITOR) 20 MG

## 2024-07-08 NOTE — PROGRESS NOTES
Bedside and Verbal shift change report given to KARTIK Mendenhall (oncoming nurse) by KARTIK Alfaro (offgoing nurse). Report included the following information Nurse Handoff Report, Adult Overview, Intake/Output, MAR, Recent Results, and Cardiac Rhythm A fib .   Patient experiences tachy with activity but has not sustained.  Patient was wheeled to OR for the scheduled procedure by OR staff. Son was in the room and witnessed the transfer.

## 2024-07-08 NOTE — PROGRESS NOTES
TRANSFER - IN REPORT:    Verbal report received from Marcy VERDUGO on Og Aleman  being received from [facility] for routine post-op      Report consisted of patient's Situation, Background, Assessment and   Recommendations(SBAR).     Information from the following report(s) Nurse Handoff Report and Cardiac Rhythm V Paced  was reviewed with the receiving nurse.    Opportunity for questions and clarification was provided.      Assessment completed upon patient's arrival to unit and care assumed.

## 2024-07-08 NOTE — PROGRESS NOTES
TRANSFER - OUT REPORT:    Verbal report given to Yessica VERDUGO on gO Aleman  being transferred to CCU for urgent transfer       Report consisted of patient's Situation, Background, Assessment and   Recommendations(SBAR).     Information from the following report(s) Nurse Handoff Report, MAR, Recent Results, and Cardiac Rhythm V Paced  was reviewed with the receiving nurse.           Lines:   Peripheral IV 07/03/24 Left Antecubital (Active)   Site Assessment Clean, dry & intact 07/08/24 0930   Line Status Infusing 07/08/24 0137   Line Care Connections checked and tightened 07/08/24 0930   Phlebitis Assessment No symptoms 07/08/24 0930   Infiltration Assessment 0 07/08/24 0930   Alcohol Cap Used Yes 07/08/24 0930   Dressing Status Clean, dry & intact 07/08/24 0930   Dressing Type Transparent 07/08/24 0930   Dressing Intervention New 07/03/24 0720       Peripheral IV 07/08/24 Right Forearm (Active)        Opportunity for questions and clarification was provided.      Patient transported with:  Monitor, O2 @ BIPAP lpm, and Registered Nurse

## 2024-07-08 NOTE — PROGRESS NOTES
Physical Therapy     Chart reviewed up to date. Attempted to see pt for PT session this AM. Pt MALU for testing and not available. Will defer and follow up as able.     Amanda Marie PT, DPT, NCS

## 2024-07-08 NOTE — CARE COORDINATION
Transition of Care Plan:     RUR: 12% \"low risk\"  Prior Level of Functioning: Independent with ADL's  Disposition: HC Unit at [facility]   Room: AQ19  Report: [phone]  If SNF or IPR: Date FOC offered: 7/2  Date FOC received: 7/2 (patient would like to go to [facility] HC Unit upon discharge)  Accepting facility: Patient is a resident of [facility], returning to  side   Date authorization started with reference number: N/A  Follow up appointments: Defer to SNF  DME needed: Defer to SNF  Transportation at discharge: Pt son to transport  IM/IMM Medicare/ letter given: 2nd IM needed prior to d/c  Is patient a [place] and connected with VA? N/A  Caregiver Contact: Og Aleman Jr (son), [phone]  Discharge Caregiver contacted prior to discharge? To be contacted   Care Conference needed? Not at this time  Barriers to discharge: EP procedure today (7/8)    1136 AM: Chart reviewed. CM following for d/c planning. MELISA contacted Liz with admissions at [facility] to inquire about pt returning to HC side of [facility]. Liz provided MELISA with pt room number and phone number for report (listed above in red). Pt will need to return to the facility by 1600PM. CM to provide Liz with update on JACKELYN once provided to CM.     ENA Vasquez  Care Management  [facility]   [phone]

## 2024-07-08 NOTE — PROGRESS NOTES
TRANSFER - OUT REPORT:    Verbal report given to Asher Jensen on Og Aleman  being transferred to IVCU for routine progression of patient care       Report consisted of patient's Situation, Background, Assessment and   Recommendations(SBAR).     Information from the following report(s) Nurse Handoff Report, Intake/Output, and MAR was reviewed with the receiving nurse.           Lines:   Peripheral IV 07/03/24 Left Antecubital (Active)   Site Assessment Clean, dry & intact 07/08/24 0137   Line Status Infusing 07/08/24 0137   Line Care Connections checked and tightened 07/08/24 0137   Phlebitis Assessment No symptoms 07/08/24 0137   Infiltration Assessment 0 07/08/24 0137   Alcohol Cap Used Yes 07/08/24 0137   Dressing Status Clean, dry & intact 07/08/24 0137   Dressing Type Transparent 07/08/24 0137   Dressing Intervention New 07/03/24 0720        Opportunity for questions and clarification was provided.

## 2024-07-08 NOTE — ANESTHESIA POSTPROCEDURE EVALUATION
Department of Anesthesiology  Postprocedure Note    Patient: Og Aleman  MRN: [id number]  YOB: 1931  Date of evaluation: 7/8/2024    Procedure Summary       Date: 07/08/24 Room / Location: [facility] EP LAB / [facility] CARDIAC CATH LAB    Anesthesia Start: 0754 Anesthesia Stop: 0850    Procedures:       Insert or replace transcath PPM leadless      Ablation AV node Diagnosis: Permanent atrial fibrillation (HCC)    Providers: Cal Tobar MD Responsible Provider: Derrick Hinojosa MD    Anesthesia Type: MAC ASA Status: 4            Anesthesia Type: MAC    Lalit Phase I: Lalit Score: 10    Lalit Phase II:      Anesthesia Post Evaluation    Patient location during evaluation: PACU  Patient participation: complete - patient participated  Level of consciousness: responsive to verbal stimuli and sleepy but conscious  Pain score: 2  Airway patency: patent  Cardiovascular status: blood pressure returned to baseline  Respiratory status: acceptable  Hydration status: stable  Comments: +Post-Anesthesia Evaluation and Assessment    Patient: Og Aleman MRN: [id number]  SSN: [id number]   YOB: 1931  Age: 89 y.o.  Sex: male          Cardiovascular Function/Vital Signs    /77   Pulse 75   Temp 97 °F (36.1 °C) (Axillary)   Resp 23   Ht 1.676 m (5' 5.98\")   Wt 77.6 kg (171 lb)   SpO2 98%   BMI 27.61 kg/m²     Patient is status post Procedure(s):  Insert or replace transcath PPM leadless  Ablation AV node.    Nausea/Vomiting: Controlled.    Postoperative hydration reviewed and adequate.    Pain:      Managed.    Neurological Status:       At baseline.    Mental Status and Level of Consciousness: Arousable.    Pulmonary Status:       Adequate oxygenation and airway patent.    Complications related to anesthesia: None    Post-anesthesia assessment completed. No concerns.    I have evaluated the patient and the patient is stable and ready to be discharged from PACU .    Signed By: Derrick Hinojosa MD

## 2024-07-09 LAB
ANION GAP SERPL CALC-SCNC: 7 MMOL/L (ref 5–15)
ARTERIAL PATENCY WRIST A: YES
BASE EXCESS BLDA CALC-SCNC: 0.2 MMOL/L
BASOPHILS # BLD: 0 K/UL (ref 0–0.1)
BASOPHILS NFR BLD: 0 % (ref 0–1)
BDY SITE: NORMAL
BUN SERPL-MCNC: 21 MG/DL (ref 6–20)
BUN/CREAT SERPL: 23 (ref 12–20)
CALCIUM SERPL-MCNC: 8.4 MG/DL (ref 8.5–10.1)
CHLORIDE SERPL-SCNC: 106 MMOL/L (ref 97–108)
CO2 SERPL-SCNC: 28 MMOL/L (ref 21–32)
CREAT SERPL-MCNC: 0.91 MG/DL (ref 0.7–1.3)
DIFFERENTIAL METHOD BLD: ABNORMAL
EKG ATRIAL RATE: 73 BPM
EKG DIAGNOSIS: NORMAL
EKG Q-T INTERVAL: 442 MS
EKG QRS DURATION: 156 MS
EKG QTC CALCULATION (BAZETT): 493 MS
EKG R AXIS: 79 DEGREES
EKG T AXIS: -73 DEGREES
EKG VENTRICULAR RATE: 75 BPM
EOSINOPHIL # BLD: 0 K/UL (ref 0–0.4)
EOSINOPHIL NFR BLD: 0 % (ref 0–7)
ERYTHROCYTE [DISTWIDTH] IN BLOOD BY AUTOMATED COUNT: 13.9 % (ref 11.5–14.5)
FIO2 ON VENT: 50 %
GLUCOSE BLD STRIP.AUTO-MCNC: 117 MG/DL (ref 65–117)
GLUCOSE SERPL-MCNC: 131 MG/DL (ref 65–100)
HCO3 BLDA-SCNC: 25 MMOL/L (ref 22–26)
HCT VFR BLD AUTO: 46.5 % (ref 36.6–50.3)
HGB BLD-MCNC: 14.5 G/DL (ref 12.1–17)
IMM GRANULOCYTES # BLD AUTO: 0.1 K/UL (ref 0–0.04)
IMM GRANULOCYTES NFR BLD AUTO: 0 % (ref 0–0.5)
LYMPHOCYTES # BLD: 3 K/UL (ref 0.8–3.5)
LYMPHOCYTES NFR BLD: 19 % (ref 12–49)
MAGNESIUM SERPL-MCNC: 2 MG/DL (ref 1.6–2.4)
MCH RBC QN AUTO: 31 PG (ref 26–34)
MCHC RBC AUTO-ENTMCNC: 31.2 G/DL (ref 30–36.5)
MCV RBC AUTO: 99.6 FL (ref 80–99)
MONOCYTES # BLD: 1 K/UL (ref 0–1)
MONOCYTES NFR BLD: 7 % (ref 5–13)
NEUTS SEG # BLD: 11.3 K/UL (ref 1.8–8)
NEUTS SEG NFR BLD: 74 % (ref 32–75)
NRBC # BLD: 0 K/UL (ref 0–0.01)
NRBC BLD-RTO: 0 PER 100 WBC
PCO2 BLDA: 39 MMHG (ref 35–45)
PEEP RESPIRATORY: 6
PH BLDA: 7.42 (ref 7.35–7.45)
PHOSPHATE SERPL-MCNC: 3.7 MG/DL (ref 2.6–4.7)
PLATELET # BLD AUTO: 131 K/UL (ref 150–400)
PMV BLD AUTO: 12.4 FL (ref 8.9–12.9)
PO2 BLDA: 94 MMHG (ref 80–100)
POTASSIUM SERPL-SCNC: 4.2 MMOL/L (ref 3.5–5.1)
RBC # BLD AUTO: 4.67 M/UL (ref 4.1–5.7)
SAO2 % BLD: 97 % (ref 92–97)
SAO2% DEVICE SAO2% SENSOR NAME: NORMAL
SERVICE CMNT-IMP: NORMAL
SODIUM SERPL-SCNC: 141 MMOL/L (ref 136–145)
SPECIMEN SITE: NORMAL
VENTILATION MODE VENT: NORMAL
WBC # BLD AUTO: 15.4 K/UL (ref 4.1–11.1)

## 2024-07-09 PROCEDURE — 6370000000 HC RX 637 (ALT 250 FOR IP): Performed by: HOSPITALIST

## 2024-07-09 PROCEDURE — 80048 BASIC METABOLIC PNL TOTAL CA: CPT

## 2024-07-09 PROCEDURE — 51798 US URINE CAPACITY MEASURE: CPT

## 2024-07-09 PROCEDURE — 84100 ASSAY OF PHOSPHORUS: CPT

## 2024-07-09 PROCEDURE — 83735 ASSAY OF MAGNESIUM: CPT

## 2024-07-09 PROCEDURE — 82962 GLUCOSE BLOOD TEST: CPT

## 2024-07-09 PROCEDURE — 6360000002 HC RX W HCPCS: Performed by: INTERNAL MEDICINE

## 2024-07-09 PROCEDURE — 6370000000 HC RX 637 (ALT 250 FOR IP): Performed by: INTERNAL MEDICINE

## 2024-07-09 PROCEDURE — 2580000003 HC RX 258: Performed by: INTERNAL MEDICINE

## 2024-07-09 PROCEDURE — 99222 1ST HOSP IP/OBS MODERATE 55: CPT | Performed by: INTERNAL MEDICINE

## 2024-07-09 PROCEDURE — 2060000000 HC ICU INTERMEDIATE R&B

## 2024-07-09 PROCEDURE — 2700000000 HC OXYGEN THERAPY PER DAY

## 2024-07-09 PROCEDURE — 36415 COLL VENOUS BLD VENIPUNCTURE: CPT

## 2024-07-09 PROCEDURE — 85025 COMPLETE CBC W/AUTO DIFF WBC: CPT

## 2024-07-09 PROCEDURE — 6370000000 HC RX 637 (ALT 250 FOR IP): Performed by: STUDENT IN AN ORGANIZED HEALTH CARE EDUCATION/TRAINING PROGRAM

## 2024-07-09 RX ORDER — ENOXAPARIN SODIUM 100 MG/ML
40 INJECTION SUBCUTANEOUS DAILY
Status: DISCONTINUED | OUTPATIENT
Start: 2024-07-09 | End: 2024-07-11 | Stop reason: HOSPADM

## 2024-07-09 RX ORDER — FUROSEMIDE 10 MG/ML
40 INJECTION INTRAMUSCULAR; INTRAVENOUS 2 TIMES DAILY
Status: COMPLETED | OUTPATIENT
Start: 2024-07-09 | End: 2024-07-09

## 2024-07-09 RX ADMIN — Medication: at 20:47

## 2024-07-09 RX ADMIN — FINASTERIDE 5 MG: 5 TABLET, FILM COATED ORAL at 09:11

## 2024-07-09 RX ADMIN — SODIUM CHLORIDE, PRESERVATIVE FREE 10 ML: 5 INJECTION INTRAVENOUS at 09:02

## 2024-07-09 RX ADMIN — ASPIRIN 81 MG: 81 TABLET, COATED ORAL at 18:20

## 2024-07-09 RX ADMIN — ENOXAPARIN SODIUM 40 MG: 100 INJECTION SUBCUTANEOUS at 11:27

## 2024-07-09 RX ADMIN — TAMSULOSIN HYDROCHLORIDE 0.4 MG: 0.4 CAPSULE ORAL at 09:11

## 2024-07-09 RX ADMIN — MELATONIN 3 MG: at 20:42

## 2024-07-09 RX ADMIN — TRAZODONE HYDROCHLORIDE 50 MG: 50 TABLET ORAL at 20:42

## 2024-07-09 RX ADMIN — LISINOPRIL 5 MG: 5 TABLET ORAL at 09:11

## 2024-07-09 RX ADMIN — Medication: at 09:05

## 2024-07-09 RX ADMIN — Medication: at 15:04

## 2024-07-09 RX ADMIN — Medication: at 07:44

## 2024-07-09 RX ADMIN — GENTAMICIN SULFATE: 1 CREAM TOPICAL at 09:16

## 2024-07-09 RX ADMIN — ATORVASTATIN CALCIUM 20 MG: 20 TABLET, FILM COATED ORAL at 20:42

## 2024-07-09 RX ADMIN — FUROSEMIDE 40 MG: 10 INJECTION, SOLUTION INTRAMUSCULAR; INTRAVENOUS at 18:36

## 2024-07-09 RX ADMIN — SODIUM CHLORIDE, PRESERVATIVE FREE 10 ML: 5 INJECTION INTRAVENOUS at 20:42

## 2024-07-09 RX ADMIN — POLYETHYLENE GLYCOL 3350 17 G: 17 POWDER, FOR SOLUTION ORAL at 20:42

## 2024-07-09 RX ADMIN — Medication 1 AMPULE: at 20:43

## 2024-07-09 RX ADMIN — FUROSEMIDE 40 MG: 10 INJECTION, SOLUTION INTRAMUSCULAR; INTRAVENOUS at 11:21

## 2024-07-09 RX ADMIN — POLYETHYLENE GLYCOL 3350 17 G: 17 POWDER, FOR SOLUTION ORAL at 09:10

## 2024-07-09 RX ADMIN — SENNOSIDES 17.2 MG: 8.6 TABLET, FILM COATED ORAL at 20:42

## 2024-07-09 RX ADMIN — Medication 1 AMPULE: at 09:18

## 2024-07-09 RX ADMIN — SENNOSIDES 17.2 MG: 8.6 TABLET, FILM COATED ORAL at 09:11

## 2024-07-09 ASSESSMENT — PAIN SCALES - GENERAL
PAINLEVEL_OUTOF10: 0

## 2024-07-09 NOTE — PROGRESS NOTES
Physician Progress Note      PATIENT:               JUANIS UP  CSN #:                  [id number]  :                       1931  ADMIT DATE:       2024 12:00 PM  DISCH DATE:  RESPONDING  PROVIDER #:        Paula Brewer MD          QUERY TEXT:    Dr Bates  Pt admitted with RLE cellulitis and possible osteomyelitis and has acute   decompensated heart failure documented. If possible, please document in   progress notes and discharge summary further specificity regarding the type   and acuity of CHF:    The medical record reflects the following:  Risk Factors: DM, HTN  Clinical Indicators:  'acute respiratory failure with pulmonary edema 24   after his procedure earlier today' Echo : EF 45-50%;  chest xray:   pulmonary edema  Treatment: Lasix iv, oxygen as needed, transferred into CCU/ICU  Options provided:  -- Acute on Chronic Systolic CHF/HFrEF  -- Acute on Chronic Diastolic CHF/HFpEF  -- Acute on Chronic Systolic and Diastolic CHF  -- Other - I will add my own diagnosis  -- Disagree - Not applicable / Not valid  -- Disagree - Clinically unable to determine / Unknown  -- Refer to Clinical Documentation Reviewer    PROVIDER RESPONSE TEXT:    Provider disagreed with this query.  flash pulmonary edema    Query created by: Mandi Mathews on 2024 2:20 PM      Electronically signed by:  Paula Brewer MD 2024 5:21 PM

## 2024-07-09 NOTE — PROGRESS NOTES
hospital problems. *      Plan:     Appears to be in optimal fluid status on examination although continues to require nasal cannula oxygen.  Could try gentle diuresis as tolerated.  Wean nasal cannula oxygen.    Echocardiogram has regional wall motion abnormality.  Recommend outpatient workup.    Permanent atrial fibrillation, watchman status, status post AV melanie ablation and pacemaker this admission    Cardiomyopathy: LVEF 45%, continue lisinopril    Thank you for allowing us to participate in the care of this patient.  Feel free to reach back out if we could be of any further assistance.    Fredy Bocanegra MD

## 2024-07-09 NOTE — CONSULTS
Palliative Medicine  Patient Name: Og Aleman  YOB: 1931  MRN: [id number]  Age: 89 y.o.  Gender: male    Date of Initial Consult: 7/9/2024  Date of Service: 7/9/2024  Time: 2:31 PM  Provider: Lili Bond MD  Hospital Day: 13  Admit Date: 6/27/2024  Referring Provider: Paulo Mitchell MD       Reasons for Consultation:  Goals of Care    HISTORY OF PRESENT ILLNESS (HPI):   Og Aleman is a 89 y.o. male with a past medical history of Atrial fibrillation with Watchman device, s/p watchman s/p recurrent fall tendency, NICM( subsequent improvement in EF ), dm htn, hld, stroke, former smoker who was admitted on 6/27/2024 from  home with a diagnosis of RLE cellulitis, possible right first digit proximal phalnax osteomyelitis, failed out patient treatment with oral Bactrim and Keflex.He was evaluated by cardiology for ongoing tachy-coleen episodes and finally had AV melanie ablation & PPM insertion on 7/8/24 early AM.       Raid response was called for sudden worsening of SOB. CXR showed diffuse pul edema. On evaluation, patient  unable to speak. Started on BiPAP. 80 mg IV lasix given. BP on higher side and on exam he has scral edema. high risk of intubation.  Patient was placed on Precedex and continues to refuse BiPAP, his breathing got better after diuresis, placed on 2 L nasal cannula.  ICU team consulted us for goals of care discussion for patient to give us guidance about if he has future episodes in breathing difficulty, and he refuses BiPAP given he is a full code we may need to do intubation.         Psychosocial: Patient is [place] lives in an independent halfway community, he is able to walk without a walker , he he has 2 children Og Aleman Jr. dtr is Ilda Holden .        PALLIATIVE DIAGNOSES:    Palliative care encounter  Hypoxia resolved  Full CODE STATUS  Advance directives      ASSESSMENT AND PLAN:   Prior to encounter reviewed his chart, including lab data radiological

## 2024-07-09 NOTE — PROGRESS NOTES
Interdisciplinary team rounds were held 7/9/2024 with the following team members: Physician, Nursing, Dietitian, Pharmacist, , , and the CCU Charge RN.    Plan of care discussed. See clinical pathway and/or care plan for interventions and desired outcomes.     Goals of the Day: Remove rivers catheter and transfer out of CCU.

## 2024-07-09 NOTE — PROGRESS NOTES
Mr Aleman is refusing to wear the Bipap this shift, he remains on 4 lpm nc resting well, no respiratory distress noted  o2 sat 98%, RN is aware of him not wanting to wear it. Bipap  is on stand by at his bedside in that he might need it.

## 2024-07-09 NOTE — PROGRESS NOTES
Critical Care Progress Note  Paula Bates MD          Date of Service:  2024  NAME:  Og Aleman  :  1931  MRN:  [id number]      Subjective/Hospital course:      89-year-old male with PMH significant for stroke 4 years ago, chronic atrial fibrillation s/p watchman status for recurrent fall tendency, NICM (subsequent improvement in ejection fraction) and diabetes mellitus.      He was admitted 24 for sepsis 2/2 skin and soft tissue infection involving right foot, subsequent wound cultures grew Pseudomonas but blood cultures were negative. He has completed Abx therapy. He was evaluated by cardiology for ongoing tachy-coleen episodes and finally had AV melanie ablation & PPM insertion today 24 early AM.      Raid response was called for sudden worsening of SOB. CXR showed diffuse pul edema. On evaluation, patient is unable to speak. Started on BiPAP. 80 mg IV lasix given. BP on higher side and on exam he has scral edema.           Assessment/Plan:     # Acute respiratory failure with hypoxemia needing NIPPV and at risk of intubation & mechanical ventilation.   # Acute decompensated heart failure, shortly after AV melanie ablation and permamant pacemaker insertion.  - Echo 45%  - Has mild to moderate edema in dependant areas.   - H/o chronic atrial fibrillation s/p watchman status for recurrent fall tendency, NICM (subsequent improvement in ejection fraction)   Plan  - Continue NC 4L  - repeat Diuresis today   - Follow up with cardiology recs  - Goals of care conversation - refusing bipap, consult palliative    Transfer to stepdown    I personally spent 00 minutes of critical care time.  This is time spent at this critically ill patient's bedside actively involved in patient care as well as the coordination of care and discussions with the patient's family.  This does not include any procedural time which has been billed separately.    Review of Systems:   Review of Systems   No  complaints       Vital Signs:   Patient Vitals for the past 4 hrs:   BP Temp Temp src Pulse Resp SpO2   07/09/24 0900 (!) 149/75 -- -- 77 19 97 %   07/09/24 0800 (!) 144/84 97.7 °F (36.5 °C) Oral 75 27 96 %   07/09/24 0730 136/62 -- -- 75 19 100 %   07/09/24 0700 108/60 -- -- 75 15 97 %        Intake/Output Summary (Last 24 hours) at 7/9/2024 1049  Last data filed at 7/9/2024 0900  Gross per 24 hour   Intake 856.91 ml   Output 3260 ml   Net -2403.09 ml        Physical Examination:    Physical Exam  Constitutional:       General: He is not in acute distress.     Appearance: He is not toxic-appearing.   HENT:      Mouth/Throat:      Mouth: Mucous membranes are moist.   Cardiovascular:      Rate and Rhythm: Normal rate.   Pulmonary:      Breath sounds: Rales present.   Abdominal:      General: Bowel sounds are normal.      Palpations: Abdomen is soft.   Musculoskeletal:      Right lower leg: Edema present.      Left lower leg: Edema present.   Neurological:      Mental Status: He is alert.         Labs and Imaging:   Reviewed.      Medications:     Current Facility-Administered Medications   Medication Dose Route Frequency    alcohol 62% (NOZIN) nasal  1 ampule  1 ampule Nasal Q12H    sodium chloride flush 0.9 % injection 5-40 mL  5-40 mL IntraVENous 2 times per day    sodium chloride flush 0.9 % injection 5-40 mL  5-40 mL IntraVENous PRN    0.9 % sodium chloride infusion   IntraVENous PRN    acetaminophen (TYLENOL) tablet 650 mg  650 mg Oral Q4H PRN    dexmedeTOMIDine (PRECEDEX) 400 mcg in sodium chloride 0.9 % 100 mL infusion  0.1-1.5 mcg/kg/hr IntraVENous Continuous    balsum peru-castor oil (VENELEX) ointment   Topical BID    lisinopril (PRINIVIL;ZESTRIL) tablet 5 mg  5 mg Oral Daily    polyethylene glycol (GLYCOLAX) packet 17 g  17 g Oral BID    senna (SENOKOT) tablet 17.2 mg  2 tablet Oral BID    gentamicin (GARAMYCIN) 0.1 % cream   Topical Daily    ammonium lactate (LAC-HYDRIN) 12 % lotion   Topical BID

## 2024-07-09 NOTE — PROGRESS NOTES
1900 Bedside and Verbal shift change report given to KARTIK Harding (oncoming nurse) by KARTIK Juan (offgoing nurse). Report included the following information Nurse Handoff Report, Index, ED Encounter Summary, ED SBAR, Adult Overview, Surgery Report, Intake/Output, MAR, Recent Results, Med Rec Status, Cardiac Rhythm Vpaced, Alarm Parameters, Quality Measures, Neuro Assessment, and Event Log.     1950 Patient awoke, anxious, disoriented, c/o pain and difficulty breathing.  Re-oriented patient, repositioned pt to supine position with HOB elevated.  After patient more alert, denies pain.  Patient alert and oriented to name, .       Shift assessment completed, see flowsheets.  Patient now A&O x4.  Increased WOB.  Rhonci noted.  Pt currently on 4L via NC.  Sats 93-94%.  Patient has productive cough.  Vpaced on monitor.       Pt passed PO swallow test.  PO medications given with sips of water.  Tolerated well.  IV lasix given.       Pt called son Anibal, anxious, and unable to understand why he was in ICU.  Spoke with son that PM medications have been given, and Precedex restarted.  Patients son verbalized understanding, and thankful for care.       Precedex started at 0.2mcg.       Patient stated he is feeling better but does not want to wear BiPAP.  Educated pt that this will help improve faster.  Informed patient that we will get him more comfortable with medications and then address BiPAP.  Patient okay with this plan.     Patient resting with eyes closed.  No signs of distress, appears more comfortable, O2 sats 97%.  Remains on 4L via NC.      0000 Reassessment completed.  Patient more comfortable.  Lungs slightly coarse, much improved from earlier assessment.  O2 4L, sats 99%. Patient still refusing to wear BiPAP.  Will notify MD.    0047 KESHA Rodriguez NP notified, pt refusing BiPAP.     0232 AM labs obtained.      0400 Reassessment completed, no changes noted.      0511 Precedex stopped at this

## 2024-07-09 NOTE — PLAN OF CARE
Problem: Chronic Conditions and Co-morbidities  Goal: Patient's chronic conditions and co-morbidity symptoms are monitored and maintained or improved  7/8/2024 2317 by Meaghan Galvez, RN  Outcome: Progressing  7/8/2024 1044 by Camila Carlton, RN  Outcome: Progressing  Flowsheets (Taken 7/8/2024 1044)  Care Plan - Patient's Chronic Conditions and Co-Morbidity Symptoms are Monitored and Maintained or Improved:   Monitor and assess patient's chronic conditions and comorbid symptoms for stability, deterioration, or improvement   Collaborate with multidisciplinary team to address chronic and comorbid conditions and prevent exacerbation or deterioration   Update acute care plan with appropriate goals if chronic or comorbid symptoms are exacerbated and prevent overall improvement and discharge

## 2024-07-09 NOTE — PROGRESS NOTES
Brief summary of visit full note to follow.    Met with Mr. Foley noted, he is in recliner on 2 L of oxygen he is alert awake oriented frail elderly gentleman before I started the conversation he asked me to call his son Og AlemanJr., to be included in the discussion, because patient feels that his short-term memory is weak and he may not be able to provide information..    I called his son Og Aleman from the room, we discussed his current medical issues, reviewed his full CODE STATUS.    For now the goal is to continue with full CODE STATUS, patient is contemplating DNR, plan to revisit further conversation tomorrow, family meeting planned for tomorrow with patient and his son at 11:30 AM.    Patient has completed an advanced directive his 2 children Og Aleman Jr. and Ilda Holden are medical POA, I have advised his son to bring advanced directive tomorrow, to scan in chart.    Thank you for your kind consult.

## 2024-07-09 NOTE — PROGRESS NOTES
1900 Bedside and Verbal shift change report given to KARTIK Harding (oncoming nurse) by KARTIK Juan (offgoing nurse). Report included the following information Nurse Handoff Report, Index, ED Encounter Summary, ED SBAR, Adult Overview, Intake/Output, MAR, Recent Results, Med Rec Status, Cardiac Rhythm Vpaced, Alarm Parameters, Quality Measures, Neuro Assessment, and Event Log.     2000 Shift assessment completed, see flowsheets.  Patient alert and oriented x4.  Sitting in chair.  Vpaced on monitor.  Lung sounds diminished, fine crackles noted.  Patient on room air.  Bowel sounds active.  Ambulates with 2 assist.  Call bell within reach.      2030 Bladder scan showed 513ml.  Encouraged urination.  Patient voided using urinal.  300ml in urinal and pad soiled due to leaking out of urinal.  Smear BM.  Applied venelex.      2109 Called [facility] to give report to nurse.  Nurse unavailable at this time and will call back for report.      2115 Report given to KARTIK Gonzalez for pt transfer to 2112, [facility].      2150 Transferred patient to room 2112 via wheelchair and [place] with PCT.      2158 Dual skin assessment completed with KARTIK Gonzalez.

## 2024-07-09 NOTE — CARE COORDINATION
Transition of Care Plan:     RUR: 14%  Prior Level of Functioning: Independent with ADL's  Disposition: HC Unit at [facility]   Room: AQ19 (CM to clarify upon discharge)  Report: [phone]  If SNF or IPR: Date FOC offered: 7/2  Date FOC received: 7/2 (patient would like to go to [facility] HC Unit upon discharge)  Accepting facility: Patient is a resident of [facility], returning to [facility]   Date authorization started with reference number: N/A  Follow up appointments: Defer to SNF  DME needed: Defer to SNF  Transportation at discharge: Pt son to transport  IM/IMM Medicare/ letter given: 2nd IM needed prior to d/c  Is patient a  and connected with VA? N/A  Caregiver Contact: Og Aleman Jr (son), [phone]  Discharge Caregiver contacted prior to discharge? To be contacted   Care Conference needed? Not at this time  Barriers to discharge: Medical stability     Pt admitted to ICU on 7/8 due to flash pulmonary edema needing NIPPV, at risk of intubation. CM discussed pt's treatment plan with medical team in IDR this AM. Pt's son, Og, was present for the conversation. Attending provider anticipates that pt will be cleared from ICU later today. Palliative consult recommended by attending MD for goals of care discussion with pt and pt's son. Case management to follow for discharge planning.     Elsie Santos LMSW,   [phone]

## 2024-07-09 NOTE — PROGRESS NOTES
0700: Bedside shift report received from KARTIK Harding. Pt resting comfortably at this time on 4L NC.   0800: Shift assessment complete. Pt is A&Ox4 with no c/o pain or discomfort at this time. Resting comfortably on 4L NC. V-paced on monitor. Son at bedside. See flowsheets/eMAR for details.   1020: Interdisciplinary rounding complete. Updated team on pt condition; new goals/orders noted.   1200: Reassessment complete. Pt on 2L NC. VSS. Pt ambulated to bedside commode with x2 assist. Activity tolerated moderately well. Pt had large, formed BM at this time. See flowsheets/eMAR for details.   1230: Pt up to chair.   1400: Lopez catheter removed per MD.   1600: Reassessment complete. No acute change  1900: Bedside shift report given to KARTIK Harding

## 2024-07-10 PROBLEM — Z78.9 FULL CODE STATUS: Status: ACTIVE | Noted: 2024-07-10

## 2024-07-10 PROBLEM — Z71.89 ADVANCED DIRECTIVES, COUNSELING/DISCUSSION: Status: ACTIVE | Noted: 2024-07-10

## 2024-07-10 PROBLEM — Z51.5 PALLIATIVE CARE ENCOUNTER: Status: ACTIVE | Noted: 2024-07-10

## 2024-07-10 PROBLEM — R09.02 HYPOXIA: Status: ACTIVE | Noted: 2024-07-10

## 2024-07-10 LAB
ANION GAP SERPL CALC-SCNC: 8 MMOL/L (ref 5–15)
BASOPHILS # BLD: 0 K/UL (ref 0–0.1)
BASOPHILS NFR BLD: 0 % (ref 0–1)
BUN SERPL-MCNC: 31 MG/DL (ref 6–20)
BUN/CREAT SERPL: 33 (ref 12–20)
CALCIUM SERPL-MCNC: 9.2 MG/DL (ref 8.5–10.1)
CHLORIDE SERPL-SCNC: 104 MMOL/L (ref 97–108)
CO2 SERPL-SCNC: 26 MMOL/L (ref 21–32)
CREAT SERPL-MCNC: 0.94 MG/DL (ref 0.7–1.3)
DIFFERENTIAL METHOD BLD: ABNORMAL
EOSINOPHIL # BLD: 0.1 K/UL (ref 0–0.4)
EOSINOPHIL NFR BLD: 1 % (ref 0–7)
ERYTHROCYTE [DISTWIDTH] IN BLOOD BY AUTOMATED COUNT: 14 % (ref 11.5–14.5)
GLUCOSE SERPL-MCNC: 96 MG/DL (ref 65–100)
HCT VFR BLD AUTO: 42.3 % (ref 36.6–50.3)
HGB BLD-MCNC: 13.6 G/DL (ref 12.1–17)
IMM GRANULOCYTES # BLD AUTO: 0 K/UL (ref 0–0.04)
IMM GRANULOCYTES NFR BLD AUTO: 0 % (ref 0–0.5)
LYMPHOCYTES # BLD: 4 K/UL (ref 0.8–3.5)
LYMPHOCYTES NFR BLD: 38 % (ref 12–49)
MAGNESIUM SERPL-MCNC: 2.1 MG/DL (ref 1.6–2.4)
MCH RBC QN AUTO: 31.7 PG (ref 26–34)
MCHC RBC AUTO-ENTMCNC: 32.2 G/DL (ref 30–36.5)
MCV RBC AUTO: 98.6 FL (ref 80–99)
MONOCYTES # BLD: 1 K/UL (ref 0–1)
MONOCYTES NFR BLD: 9 % (ref 5–13)
NEUTS SEG # BLD: 5.4 K/UL (ref 1.8–8)
NEUTS SEG NFR BLD: 52 % (ref 32–75)
NRBC # BLD: 0 K/UL (ref 0–0.01)
NRBC BLD-RTO: 0 PER 100 WBC
PHOSPHATE SERPL-MCNC: 2.5 MG/DL (ref 2.6–4.7)
PLATELET # BLD AUTO: 105 K/UL (ref 150–400)
PMV BLD AUTO: 12.5 FL (ref 8.9–12.9)
POTASSIUM SERPL-SCNC: 3.6 MMOL/L (ref 3.5–5.1)
RBC # BLD AUTO: 4.29 M/UL (ref 4.1–5.7)
SODIUM SERPL-SCNC: 138 MMOL/L (ref 136–145)
WBC # BLD AUTO: 10.6 K/UL (ref 4.1–11.1)

## 2024-07-10 PROCEDURE — 6370000000 HC RX 637 (ALT 250 FOR IP): Performed by: HOSPITALIST

## 2024-07-10 PROCEDURE — 83735 ASSAY OF MAGNESIUM: CPT

## 2024-07-10 PROCEDURE — 85025 COMPLETE CBC W/AUTO DIFF WBC: CPT

## 2024-07-10 PROCEDURE — 2060000000 HC ICU INTERMEDIATE R&B

## 2024-07-10 PROCEDURE — 97116 GAIT TRAINING THERAPY: CPT

## 2024-07-10 PROCEDURE — 97530 THERAPEUTIC ACTIVITIES: CPT

## 2024-07-10 PROCEDURE — 36415 COLL VENOUS BLD VENIPUNCTURE: CPT

## 2024-07-10 PROCEDURE — 80048 BASIC METABOLIC PNL TOTAL CA: CPT

## 2024-07-10 PROCEDURE — 6370000000 HC RX 637 (ALT 250 FOR IP): Performed by: STUDENT IN AN ORGANIZED HEALTH CARE EDUCATION/TRAINING PROGRAM

## 2024-07-10 PROCEDURE — 6370000000 HC RX 637 (ALT 250 FOR IP): Performed by: INTERNAL MEDICINE

## 2024-07-10 PROCEDURE — 84100 ASSAY OF PHOSPHORUS: CPT

## 2024-07-10 PROCEDURE — 6360000002 HC RX W HCPCS: Performed by: INTERNAL MEDICINE

## 2024-07-10 PROCEDURE — 2580000003 HC RX 258: Performed by: INTERNAL MEDICINE

## 2024-07-10 RX ADMIN — Medication: at 09:49

## 2024-07-10 RX ADMIN — SENNOSIDES 17.2 MG: 8.6 TABLET, FILM COATED ORAL at 09:46

## 2024-07-10 RX ADMIN — Medication: at 21:27

## 2024-07-10 RX ADMIN — POLYETHYLENE GLYCOL 3350 17 G: 17 POWDER, FOR SOLUTION ORAL at 21:26

## 2024-07-10 RX ADMIN — FINASTERIDE 5 MG: 5 TABLET, FILM COATED ORAL at 09:46

## 2024-07-10 RX ADMIN — ASPIRIN 81 MG: 81 TABLET, COATED ORAL at 18:33

## 2024-07-10 RX ADMIN — ENOXAPARIN SODIUM 40 MG: 100 INJECTION SUBCUTANEOUS at 09:46

## 2024-07-10 RX ADMIN — SODIUM CHLORIDE, PRESERVATIVE FREE 10 ML: 5 INJECTION INTRAVENOUS at 21:25

## 2024-07-10 RX ADMIN — SENNOSIDES 17.2 MG: 8.6 TABLET, FILM COATED ORAL at 21:26

## 2024-07-10 RX ADMIN — TAMSULOSIN HYDROCHLORIDE 0.4 MG: 0.4 CAPSULE ORAL at 09:46

## 2024-07-10 RX ADMIN — Medication: at 09:48

## 2024-07-10 RX ADMIN — Medication 1 AMPULE: at 21:50

## 2024-07-10 RX ADMIN — LISINOPRIL 5 MG: 5 TABLET ORAL at 09:47

## 2024-07-10 RX ADMIN — TRAZODONE HYDROCHLORIDE 50 MG: 50 TABLET ORAL at 21:26

## 2024-07-10 RX ADMIN — POLYETHYLENE GLYCOL 3350 17 G: 17 POWDER, FOR SOLUTION ORAL at 09:47

## 2024-07-10 RX ADMIN — ATORVASTATIN CALCIUM 20 MG: 20 TABLET, FILM COATED ORAL at 21:26

## 2024-07-10 RX ADMIN — SODIUM CHLORIDE, PRESERVATIVE FREE 10 ML: 5 INJECTION INTRAVENOUS at 09:50

## 2024-07-10 RX ADMIN — GENTAMICIN SULFATE: 1 CREAM TOPICAL at 09:49

## 2024-07-10 RX ADMIN — Medication: at 14:00

## 2024-07-10 RX ADMIN — MELATONIN 3 MG: at 21:26

## 2024-07-10 ASSESSMENT — PAIN SCALES - GENERAL
PAINLEVEL_OUTOF10: 0

## 2024-07-10 NOTE — PLAN OF CARE
Problem: Chronic Conditions and Co-morbidities  Goal: Patient's chronic conditions and co-morbidity symptoms are monitored and maintained or improved  7/10/2024 1058 by Kristel Talley RN  Outcome: Progressing  Flowsheets (Taken 7/10/2024 0800)  Care Plan - Patient's Chronic Conditions and Co-Morbidity Symptoms are Monitored and Maintained or Improved: Monitor and assess patient's chronic conditions and comorbid symptoms for stability, deterioration, or improvement  7/9/2024 2316 by Lisa Naranjo RN  Outcome: Progressing  7/9/2024 2313 by Meaghan Galvez RN  Outcome: Progressing  Flowsheets (Taken 7/9/2024 2213 by Adriana Bonds RN)  Care Plan - Patient's Chronic Conditions and Co-Morbidity Symptoms are Monitored and Maintained or Improved: Monitor and assess patient's chronic conditions and comorbid symptoms for stability, deterioration, or improvement     Problem: Respiratory - Adult  Goal: Achieves optimal ventilation and oxygenation  7/10/2024 1058 by Kristel Talley RN  Outcome: Progressing  Flowsheets (Taken 7/9/2024 2213 by Adriana Bonds RN)  Achieves optimal ventilation and oxygenation: Assess for changes in respiratory status  7/9/2024 2316 by Lisa Naranjo RN  Outcome: Progressing  7/9/2024 2313 by Meaghan Galvez RN  Outcome: Progressing  Flowsheets (Taken 7/9/2024 2213 by Adriana Bonds RN)  Achieves optimal ventilation and oxygenation: Assess for changes in respiratory status

## 2024-07-10 NOTE — PROGRESS NOTES
patients):   Clinical Pain Assessment  Severity: 0       NVPS:       RDOS:         Vital Signs: Blood pressure (!) 119/59, pulse 75, temperature 98.1 °F (36.7 °C), temperature source Oral, resp. rate 25, height 1.676 m (5' 5.98\"), weight 77.6 kg (171 lb), SpO2 94 %.    PHYSICAL ASSESSMENT:   General: [x] Oriented x3  [] Well appearing  [] Intubated  []Ill appearing  []Other:in recliner  Mental Status: [x] Normal mental status exam  [] Drowsy  [] Confused  []Other: forgetful   Cardiovascular: [] Regular rate/rhythm  [] Arrhythmia  [] Other:  Chest: [x] Effort normal  []Lungs clear  [] Respiratory distress  []Tachypnea  [] Other: off supplemental oxygen .  Abdomen: [] Soft/non-tender  [] Normal appearance  [] Distended  [] Ascites  [] Other:  Neurological: [x] Normal speech  [] Normal sensation  []Deficits present:  Extremity: [] Normal skin color/temp  [] Clubbing/cyanosis  [x] No edema  [] Other:    Wt Readings from Last 15 Encounters:   07/08/24 77.6 kg (171 lb)        Current Diet: ADULT DIET; Regular; Low Fat/Low Chol/High Fiber/2 gm Na  ADULT ORAL NUTRITION SUPPLEMENT; Breakfast, Lunch, Dinner; Standard High Calorie/High Protein Oral Supplement  DIET ONE TIME MESSAGE;       PSYCHOSOCIAL/SPIRITUAL SCREENING:   Palliative IDT has assessed this patient for cultural preferences / practices and a referral made as appropriate to needs (Cultural Services, Patient Advocacy, Ethics, etc.)    Spiritual Affiliation: None    Any spiritual / Shinto concerns:  [] Yes /  [x] No   If \"Yes\" to discuss with pastoral care during IDT     Does caregiver feel burdened by caring for their loved one:   [] Yes /  [x] No /  [] No Caregiver Present/Available [] No Caregiver [] Pt Lives at Facility  If \"Yes\" to discuss with social work during IDT    Anticipatory grief assessment:   [x] Normal  / [] Maladaptive     If \"Maladaptive\" to discuss with social work during IDT    ESAS Anxiety: Anxiety Score: Not anxious    ESAS Depression:

## 2024-07-10 NOTE — PROGRESS NOTES
Hospitalist Progress Note    NAME:   Og Aleman   : 1931   MRN: [id number]     Date/Time: 7/10/2024 1:49 PM  Patient PCP: Herman Ortiz MD    Transferred to ICU 24 for respiratory failure    Assessment / Plan:    Acute hypoxic respiratory failure  Patient developed acute respiratory failure with pulmonary edema 24 after his procedure pacemaker, ablation   gave lasix however persistently hypoxic and with increased work of breathing. Discussed code status with patient and family, remains full code. Placed on bipap and ordered lasix. CXR showing bilateral pulmonary edema, likely flash pulmonary edema after procedure. Consulted and transferred to ICU, appreciate assistance  Downgraded from ICU   Currently on room air  Patient refused BiPAP  He is full code by palliative discussion  Gentle diuresis as tolerated  Echo showed regional wall motion abnormalities, cardio evaluated, recommended outpatient follow-up    Right lower extremity cellulitis-of note patient presented with right lower extremity cellulitis, patient's MRI is negative for osteomyelitis,   wound cultures are showing Pseudomonas  blood cultures neg  Stopped vancomycin  - continue cefepime  Continue steroid taper  - patient's HR increases to 170s when he stands up per nursing, do not think it is safe for him to discharge home on abx and return for EP procedure, discussed with Cardiology   - s/p Cefepime finishing 10 day course on  (started )  Continue wound care as per wound care recommendations  Podiatry consult appreciated, continue to follow recommendations     History of BPH-continue home medications    Constipation  - increased bowel regimen     Hyperlipidemia-continue statin     Tachybrady syndrome  Hypertension  History of atrial fibrillation  - Cardiology consulted given bradycardia and tachycardia  - underwent pacemaker placement and ablation    -continue current antihypertensive medications     History  making which includes reviewing the patient's past medical records, current laboratory results, and actual Xray films in order to assess, support vital system function, and to treat this degree of vital organ system failure, and to prevent further life threatening deterioration of the patient’s condition.        Signed: Shyla Valerio MD

## 2024-07-10 NOTE — PROGRESS NOTES
0700: Bedside and Verbal shift change report given to Kinza Talley RN   (oncoming nurse) by KARTIK Gonzalez/Adriana RN (offgoing nurse). Report included the following information Nurse Handoff Report, Index, Adult Overview, Intake/Output, MAR, Recent Results, and Cardiac Rhythm NSR-ST .     1054: Son at bedside with MD to discuss code status.    1200: Patient assisted to the bathroom with wang steady x2 assist. Physical therapy arrived while patient still in bathroom. Patient is agreeable to work with PT.    1300: Patient remains in chair. Malewick removed per patient request and patient would like to walk into the bathroom today.    1800: Patient able to ambulate to the bathroom with minimal assist with a walker.

## 2024-07-10 NOTE — PLAN OF CARE
Problem: Physical Therapy - Adult  Goal: By Discharge: Performs mobility at highest level of function for planned discharge setting.  See evaluation for individualized goals.  Description: FUNCTIONAL STATUS PRIOR TO ADMISSION: Patient reports having SPC and rollator. Sounds like it depends on the day or what he is doing for which device he uses.     HOME SUPPORT PRIOR TO ADMISSION: The patient lives in an independent living apartment at [facility].    Physical Therapy Goals  Initiated 6/28/2024  1.  Patient will move from supine to sit and sit to supine in bed with modified independence within 7 day(s).    2.  Patient will perform sit to stand with modified independence within 7 day(s).  3.  Patient will transfer from bed to chair and chair to bed with modified independence using the least restrictive device within 7 day(s).  4.  Patient will ambulate with modified independence for 300 feet with the least restrictive device within 7 day(s).     Re-assessment 7/10/24  1.  Patient will move from supine to sit and sit to supine in bed with modified independence within 7 day(s).    2.  Patient will perform sit to stand with stand-by assist within 7 day(s).  3.  Patient will transfer from bed to chair and chair to bed with stand-by assist using the least restrictive device within 7 day(s).  4.  Patient will ambulate with stand-by assist for 75 feet with the least restrictive device within 7 day(s).     Outcome: Progressing     PHYSICAL THERAPY TREATMENT: WEEKLY REASSESSMENT    Patient: Og Aleman (89 y.o. male)  Date: 7/10/2024  Primary Diagnosis: Diabetic foot ulcer with osteomyelitis (HCC) [E11.621, E11.69, L97.509, M86.9]  Acute osteomyelitis of right foot (HCC) [M86.171]  Procedure(s) (LRB):  Insert or replace transcath PPM leadless (N/A)  Ablation AV node (N/A) 2 Days Post-Op   Precautions: Fall Risk                      ASSESSMENT :  Patient continues to benefit from skilled PT services and is slowly

## 2024-07-10 NOTE — PROGRESS NOTES
Palliative Medicine  Per Dr. Bond: Og Aleman is a 89 y.o. male with a past medical history of Atrial fibrillation with Watchman device, s/p watchman s/p recurrent fall tendency, NICM( subsequent improvement in EF ), dm htn, hld, stroke, former smoker who was admitted on 2024 from  home with a diagnosis of RLE cellulitis, possible right first digit proximal phalnax osteomyelitis, failed out patient treatment with oral Bactrim and Keflex.He was evaluated by cardiology for ongoing tachy-coleen episodes and finally had AV melanie ablation & PPM insertion on 24 early AM.   Raid response was called for sudden worsening of SOB. CXR showed diffuse pul edema. On evaluation, patient  unable to speak. Started on BiPAP. 80 mg IV lasix given. BP on higher side and on exam he has scral edema. high risk of intubation.  Patient was placed on Precedex and continues to refuse BiPAP, his breathing got better after diuresis, placed on 2 L nasal cannula.  ICU team consulted us for goals of care discussion for patient to give us guidance about if he has future episodes in breathing difficulty, and he refuses BiPAP given he is a full code we may need to do intubation.     Code Status: Full Code    Advance Care Planning: Patient is .    AMD completed 7/10/24 names his son Og CABRERA as primary and daughter Katie as secondary HCDM. He indicated that he wants \"all treatments to prolong my life as long as possible within the limits of generally accepted health care standards.\" He also indicated in II.2 that \"I want to try treatments for a period of time in the hope of some improvement of my condition.\" H suggests \"10 days.\"    Patient / Family Encounter Documentation    Participants (names): Og Aleman, son Og CABRERA, Dr. Bond, Criss Moseley, [facility]    Narrative: Palliative team met with patient and son, who had drafted a new AMD because his existing one named his spouse, who is .  Patient was alert and  oriented, sitting up in a chair and receptive to encounter.  He expressed concern about having urgency to urinate and not feeling like he was able, though he has a condom catheter that appeared to be working.  He expressed looking forward to being discharged and [place]. Copies of AMD were made for chart and for son and they were given a Palliative Medicine business card for reference. They expressed appreciation for Palliative team support.    Psychosocial Issues Identified/ Resilience Factors: Mr. Aleman was drafted and served 2 years in the [employer]. He has not applied for VA benefits to date, but may consider it.  He was  and has 2 children, Keena and Og CABRERA. He moved from [facility] to [facility] recently. He is .     Caregiver [place]: Not assessed.  Does the caregiver feel confident administering medication?   Does the caregiver need any help connecting with community resources?   Does the caregiver feel confident assisting with activities of daily living?     Goals of Care / Plan:   Patient's symptoms will be managed.  Plan is for rehab at [facility], then return [place] apartment [place].  PT/OT expertise appreciated.  CM assistance is appreciated.  AMD completed today is to be scanned to chart.  Goals are clear for rehab and to remain full code.  Palliative team is available for education, support as appropriate through this hospitalization.    Thank you for including Palliative Medicine in the care of Mr. Og Aleman.    Criss Moseley, [facility]  [phone] ([phone])

## 2024-07-10 NOTE — PLAN OF CARE
Problem: Chronic Conditions and Co-morbidities  Goal: Patient's chronic conditions and co-morbidity symptoms are monitored and maintained or improved  Outcome: Progressing     Problem: Respiratory - Adult  Goal: Achieves optimal ventilation and oxygenation  Outcome: Progressing

## 2024-07-10 NOTE — CARE COORDINATION
1411 - CM sent a message to [facility] to determine if they will have a bed available tomorrow.    1439 - CM contacted Liz at [facility] who confirmed they will have a SNF bed tomorrow.      Yudith Monge, DARENN, RN    Care Management  [phone]

## 2024-07-10 NOTE — PROGRESS NOTES
End of Shift Note    Bedside shift change report given to Elsie Morris RN (oncoming nurse) by KARTIK Wright and KARTIK Gonzalez(offgoing nurse).  Report included the following information SBAR    Shift worked:  0218-4485     Shift summary and any significant changes:     Pt came from CCU at 2100. AM labs drawn     Concerns for physician to address:  Code status      Zone phone for oncoming shift:   [phone]       Activity:     Number times ambulated in hallways past shift: 0  Number of times OOB to chair past shift: 0    Cardiac:   Cardiac Monitoring: Yes           Access:  Current line(s): PIV     Genitourinary:   Urinary status: voiding and external catheter    Respiratory:      Chronic home O2 use?: NO  Incentive spirometer at bedside: NO       GI:     Current diet:  ADULT DIET; Regular; Low Fat/Low Chol/High Fiber/2 gm Na  ADULT ORAL NUTRITION SUPPLEMENT; Breakfast, Lunch, Dinner; Standard High Calorie/High Protein Oral Supplement  DIET ONE TIME MESSAGE;  Passing flatus: YES  Tolerating current diet: YES       Pain Management:   Patient states pain is manageable on current regimen: YES    Skin:     Interventions: turn team, specialty bed, float heels, increase time out of bed, foam dressing, PT/OT consult, limit briefs, internal/external urinary devices, and nutritional support    Patient Safety:  Fall Score:    Interventions: bed/chair alarm, assistive device (walker, cane. etc), gripper socks, pt to call before getting OOB, stay with me (per policy), sitter at bedside, and gait belt       Length of Stay:  Expected LOS: 13  Actual LOS: 13      Lisa Naranjo RN

## 2024-07-10 NOTE — PLAN OF CARE
Problem: Chronic Conditions and Co-morbidities  Goal: Patient's chronic conditions and co-morbidity symptoms are monitored and maintained or improved  7/9/2024 2316 by Lisa Naranjo RN  Outcome: Progressing  7/9/2024 2313 by Meaghan Galvez RN  Outcome: Progressing     Problem: Respiratory - Adult  Goal: Achieves optimal ventilation and oxygenation  7/9/2024 2316 by Lisa Naranjo RN  Outcome: Progressing  7/9/2024 2313 by Meaghan Galvez RN  Outcome: Progressing

## 2024-07-10 NOTE — PROGRESS NOTES
TRANSFER - IN REPORT:    Verbal report received from KARTIK Harding on Og Aleman  being received from CCU for routine progression of patient care      Report consisted of patient's Situation, Background, Assessment and   Recommendations(SBAR).     Information from the following report(s) Nurse Handoff Report, ED Encounter Summary, ED SBAR, Adult Overview, Surgery Report, Intake/Output, MAR, Recent Results, Med Rec Status, Cardiac Rhythm V-paced, and Neuro Assessment was reviewed with the receiving nurse.    Opportunity for questions and clarification was provided.      Assessment completed upon patient's arrival to unit and care assumed.

## 2024-07-10 NOTE — CARE COORDINATION
CM verified patient has a qualifying hospital stay for Skilled Nursing Facility.  .  Admit inpatient on 6/27/2024. ENA CHOW  [phone]

## 2024-07-11 VITALS
TEMPERATURE: 97.2 F | HEART RATE: 75 BPM | HEIGHT: 66 IN | WEIGHT: 168.21 LBS | DIASTOLIC BLOOD PRESSURE: 61 MMHG | SYSTOLIC BLOOD PRESSURE: 120 MMHG | OXYGEN SATURATION: 93 % | RESPIRATION RATE: 19 BRPM | BODY MASS INDEX: 27.03 KG/M2

## 2024-07-11 LAB
ANION GAP SERPL CALC-SCNC: 7 MMOL/L (ref 5–15)
BASOPHILS # BLD: 0.1 K/UL (ref 0–0.1)
BASOPHILS NFR BLD: 1 % (ref 0–1)
BUN SERPL-MCNC: 31 MG/DL (ref 6–20)
BUN/CREAT SERPL: 36 (ref 12–20)
CALCIUM SERPL-MCNC: 9.2 MG/DL (ref 8.5–10.1)
CHLORIDE SERPL-SCNC: 106 MMOL/L (ref 97–108)
CO2 SERPL-SCNC: 25 MMOL/L (ref 21–32)
CREAT SERPL-MCNC: 0.86 MG/DL (ref 0.7–1.3)
DIFFERENTIAL METHOD BLD: ABNORMAL
EOSINOPHIL # BLD: 0.1 K/UL (ref 0–0.4)
EOSINOPHIL NFR BLD: 2 % (ref 0–7)
ERYTHROCYTE [DISTWIDTH] IN BLOOD BY AUTOMATED COUNT: 14 % (ref 11.5–14.5)
GLUCOSE SERPL-MCNC: 94 MG/DL (ref 65–100)
HCT VFR BLD AUTO: 40.9 % (ref 36.6–50.3)
HGB BLD-MCNC: 13 G/DL (ref 12.1–17)
IMM GRANULOCYTES # BLD AUTO: 0.1 K/UL (ref 0–0.04)
IMM GRANULOCYTES NFR BLD AUTO: 1 % (ref 0–0.5)
LYMPHOCYTES # BLD: 4.2 K/UL (ref 0.8–3.5)
LYMPHOCYTES NFR BLD: 46 % (ref 12–49)
MAGNESIUM SERPL-MCNC: 2.2 MG/DL (ref 1.6–2.4)
MCH RBC QN AUTO: 31.5 PG (ref 26–34)
MCHC RBC AUTO-ENTMCNC: 31.8 G/DL (ref 30–36.5)
MCV RBC AUTO: 99 FL (ref 80–99)
MONOCYTES # BLD: 0.7 K/UL (ref 0–1)
MONOCYTES NFR BLD: 8 % (ref 5–13)
NEUTS SEG # BLD: 3.8 K/UL (ref 1.8–8)
NEUTS SEG NFR BLD: 42 % (ref 32–75)
NRBC # BLD: 0 K/UL (ref 0–0.01)
NRBC BLD-RTO: 0 PER 100 WBC
PHOSPHATE SERPL-MCNC: 2.9 MG/DL (ref 2.6–4.7)
PLATELET # BLD AUTO: 103 K/UL (ref 150–400)
PMV BLD AUTO: 12.7 FL (ref 8.9–12.9)
POTASSIUM SERPL-SCNC: 4.4 MMOL/L (ref 3.5–5.1)
RBC # BLD AUTO: 4.13 M/UL (ref 4.1–5.7)
SODIUM SERPL-SCNC: 138 MMOL/L (ref 136–145)
WBC # BLD AUTO: 8.8 K/UL (ref 4.1–11.1)

## 2024-07-11 PROCEDURE — 36415 COLL VENOUS BLD VENIPUNCTURE: CPT

## 2024-07-11 PROCEDURE — 84100 ASSAY OF PHOSPHORUS: CPT

## 2024-07-11 PROCEDURE — 6370000000 HC RX 637 (ALT 250 FOR IP): Performed by: INTERNAL MEDICINE

## 2024-07-11 PROCEDURE — 6370000000 HC RX 637 (ALT 250 FOR IP): Performed by: STUDENT IN AN ORGANIZED HEALTH CARE EDUCATION/TRAINING PROGRAM

## 2024-07-11 PROCEDURE — 83735 ASSAY OF MAGNESIUM: CPT

## 2024-07-11 PROCEDURE — 80048 BASIC METABOLIC PNL TOTAL CA: CPT

## 2024-07-11 PROCEDURE — 2580000003 HC RX 258: Performed by: INTERNAL MEDICINE

## 2024-07-11 PROCEDURE — 6360000002 HC RX W HCPCS: Performed by: INTERNAL MEDICINE

## 2024-07-11 PROCEDURE — 97116 GAIT TRAINING THERAPY: CPT

## 2024-07-11 PROCEDURE — 6370000000 HC RX 637 (ALT 250 FOR IP): Performed by: HOSPITALIST

## 2024-07-11 PROCEDURE — 85025 COMPLETE CBC W/AUTO DIFF WBC: CPT

## 2024-07-11 RX ADMIN — GENTAMICIN SULFATE: 1 CREAM TOPICAL at 09:12

## 2024-07-11 RX ADMIN — Medication: at 09:11

## 2024-07-11 RX ADMIN — SODIUM CHLORIDE, PRESERVATIVE FREE 10 ML: 5 INJECTION INTRAVENOUS at 09:12

## 2024-07-11 RX ADMIN — FINASTERIDE 5 MG: 5 TABLET, FILM COATED ORAL at 09:10

## 2024-07-11 RX ADMIN — TAMSULOSIN HYDROCHLORIDE 0.4 MG: 0.4 CAPSULE ORAL at 09:10

## 2024-07-11 RX ADMIN — SENNOSIDES 17.2 MG: 8.6 TABLET, FILM COATED ORAL at 09:10

## 2024-07-11 RX ADMIN — Medication: at 13:25

## 2024-07-11 RX ADMIN — POLYETHYLENE GLYCOL 3350 17 G: 17 POWDER, FOR SOLUTION ORAL at 09:09

## 2024-07-11 RX ADMIN — Medication 1 AMPULE: at 09:11

## 2024-07-11 RX ADMIN — ENOXAPARIN SODIUM 40 MG: 100 INJECTION SUBCUTANEOUS at 09:10

## 2024-07-11 RX ADMIN — LISINOPRIL 5 MG: 5 TABLET ORAL at 09:10

## 2024-07-11 ASSESSMENT — PAIN SCALES - GENERAL
PAINLEVEL_OUTOF10: 0

## 2024-07-11 NOTE — PROGRESS NOTES
End of Shift Note    Bedside shift change report given to KARTIK Benz (oncoming nurse) by KARTIK Wright and KARTIK Gonzalez(offgoing nurse).  Report included the following information SBAR, Kardex, Recent Results, and Cardiac Rhythm V-paced    Shift worked:  0368-1862     Shift summary and any significant changes:    Pt is more ambulatory this shift. Up to bathroom twice during the night able to ambulate with walker and standby assist.  Patient is requesting PT take him for a walk in the hallway before he is dishcarged   Concerns for physician to address:  [facility] phone for oncoming shift:   [phone]       Activity:     Number times ambulated in hallways past shift: 0  Number of times OOB to chair past shift: 0    Cardiac:   Cardiac Monitoring: Yes           Access:  Current line(s): PIV     Genitourinary:   Urinary status: voiding and external catheter    Respiratory:      Chronic home O2 use?: NO  Incentive spirometer at bedside: YES       GI:     Current diet:  ADULT DIET; Regular; Low Fat/Low Chol/High Fiber/2 gm Na  ADULT ORAL NUTRITION SUPPLEMENT; Breakfast, Lunch, Dinner; Standard High Calorie/High Protein Oral Supplement  DIET ONE TIME MESSAGE;  Passing flatus: YES  Tolerating current diet: YES       Pain Management:   Patient states pain is manageable on current regimen: YES    Skin:     Interventions: turn team, specialty bed, float heels, increase time out of bed, limit briefs, internal/external urinary devices, and nutritional support    Patient Safety:  Fall Score:    Interventions: bed/chair alarm, assistive device (walker, cane. etc), gripper socks, pt to call before getting OOB, stay with me (per policy), and gait belt       Length of Stay:  Expected LOS: 14  Actual LOS: 14      Lisa Naranjo RN

## 2024-07-11 NOTE — PROGRESS NOTES
1905- Bedside and Verbal shift change report given to Adriana RN and LisaRN (oncoming nurse) by KARTIK Humphries  (offgoing nurse). Report included the following information Nurse Handoff Report, Index, Adult Overview, Intake/Output, MAR, and Recent Results.      2100- patient transferred out of chair into bed. PM medications given.     0400 - Patient remained in the bed with eyes closed even breathing until 0400. Ambulated to restroom. AM labs drawn and wound care completed. Patient up to chair.

## 2024-07-11 NOTE — PROGRESS NOTES
Discharge Summary     Patient: Og Aleman MRN: [id number]  SSN: [id number]    YOB: 1931  Age: 89 y.o.  Sex: male       Code Status: Full  Allergies: No Known Allergies    Admit Date: 2024    Discharge Date: 2024      Admission Diagnoses: Diabetic foot ulcer with osteomyelitis (HCC) [E11.621, E11.69, L97.509, M86.9]  Acute osteomyelitis of right foot (HCC) [M86.171]    PMH:   Past Medical History:   Diagnosis Date    Arrhythmia 2017    a-fib    Hypertension     Stroke (HCC)        Social History:  Social history   Social History     Tobacco Use    Smoking status: Former     Current packs/day: 0.00     Types: Cigarettes     Quit date: 1995     Years since quittin.5    Smokeless tobacco: Never   Substance Use Topics    Alcohol use: Yes     Alcohol/week: 1.0 standard drink of alcohol     Drug History   Social History     Substance and Sexual Activity   Drug Use Never     Familiy History No family history on file.    Consults:  Palliative, Podiatry     Significant Diagnostic Studies:     Discharge Condition: Stable    Disposition: SNF    Discharge Medications:   Current Discharge Medication List        START taking these medications    Details   aluminum & magnesium hydroxide-simethicone (MAALOX) 200-200-20 MG/5ML SUSP suspension Take 30 mLs by mouth every 6 hours as needed for Indigestion  Qty: 769 mL, Refills: 0      ammonium lactate (LAC-HYDRIN) 12 % lotion Apply topically as needed.  Qty: 225 g, Refills: 0      dilTIAZem (CARDIZEM 12 HR) 60 MG extended release capsule Take 1 capsule by mouth 2 times daily  Qty: 60 capsule, Refills: 3      gentamicin (GARAMYCIN) 0.1 % cream Apply topically 3 times daily.  Qty: 15 g, Refills: 0      melatonin 3 MG TABS tablet Take 1 tablet by mouth nightly  Qty: 30 tablet, Refills: 0      !! predniSONE (DELTASONE) 10 MG tablet Take 1 tablet by mouth daily for 3 doses  Qty: 3 tablet, Refills: 0      !! predniSONE (DELTASONE) 5 MG tablet Take 1  tablet by mouth daily for 3 doses  Qty: 3 tablet, Refills: 0      levoFLOXacin (LEVAQUIN) 750 MG tablet Take 1 tablet by mouth every 48 hours for 14 days  Qty: 7 tablet, Refills: 0       !! - Potential duplicate medications found. Please discuss with provider.        CONTINUE these medications which have NOT CHANGED    Details   aspirin 81 MG EC tablet Take 1 tablet by mouth daily      vitamin D (ERGOCALCIFEROL) 1.25 MG (60875 UT) CAPS capsule Take 1 capsule by mouth once a week Sunday      tamsulosin (FLOMAX) 0.4 MG capsule Take 1 capsule by mouth daily      traZODone (DESYREL) 50 MG tablet Take 1 tablet by mouth nightly      methIMAzole (TAPAZOLE) 5 MG tablet Take 1 tablet by mouth once a week Sunday      atorvastatin (LIPITOR) 20 MG tablet Take 1 tablet by mouth at bedtime      finasteride (PROSCAR) 5 MG tablet Take 1 tablet by mouth daily      furosemide (LASIX) 20 MG tablet Take 1 tablet by mouth daily      lisinopril (PRINIVIL;ZESTRIL) 20 MG tablet Take 0.5 tablets by mouth daily           STOP taking these medications       sulfamethoxazole-trimethoprim (BACTRIM DS;SEPTRA DS) 800-160 MG per tablet Comments:   Reason for Stopping:         cephALEXin (KEFLEX) 500 MG capsule Comments:   Reason for Stopping:         metoprolol succinate (TOPROL XL) 100 MG extended release tablet Comments:   Reason for Stopping:               Hard scripts included in packet: no    Isolation/Precautions: No  Isolation Type: NA    Activity: activity as tolerated  Diet: ADULT DIET; Regular; Low Fat/Low Chol/High Fiber/2 gm Na  ADULT ORAL NUTRITION SUPPLEMENT; Breakfast, Lunch, Dinner; Standard High Calorie/High Protein Oral Supplement  DIET ONE TIME MESSAGE;  Wound Care: as directed   Lines: no access  [place]: N/A  Last BM (including prior to admit): 07/10/24    Last Vitals: VITALS:  /61   Pulse 75   Temp 97.2 °F (36.2 °C) (Axillary)   Resp 19   Ht 1.676 m (5' 5.98\")   Wt 76.3 kg (168 lb 3.4 oz)   SpO2 93%   BMI 27.16

## 2024-07-11 NOTE — CARE COORDINATION
Patient is clear from a CM standpoint.    Transition of Care Plan:  SNF     RUR: 13% (low RUR)  Prior Level of Functioning: Independent with ADL's  Disposition: CW SNF, then return to ILF  If SNF or IPR: Date FOC offered: 7/2  Date FOC received: 7/2  SNF  Accepting facility: Patient is a resident of [facility], returning to  side   Date authorization started with reference number: N/A - three night stay verified  Follow up appointments: defer to SNF  DME needed: defer to SNF.  Patient has a cane, rollator, sock aid, wheelchair and transport chair at home.  Transportation at discharge: Son to transport around 2pm to [facility], room A219, nursing call report to: [phone].  IM/IMM Medicare/ letter given: 2nd IM done 7/11/2024.  Is patient a [place] and connected with VA? N/A  Caregiver Contact: Og Aleman Jr. - son - [phone]  Discharge Caregiver contacted prior to discharge? Aware of pending discharge today.  Care Conference needed? No.  Barriers to discharge: None.    1114 - CM reached out to attending to determine if patient needs BiPAP or CPAP on discharge.  CWs previously said they can accept the patient today.    1409 to 1214 - CM received call from MD regarding patient not needing BiPAP or CPAP for discharge; aware that patient can discharge today around 2pm.  CM spoke with Liz at [facility] who is agreeable to patient coming to [facility] today at 2pm into room A219.  RN/MD/patient/son agreeable to son transporting at 2pm to [facility].    Transition of Care Plan to SNF/Rehab    Communication to Patient/Family:  Met with patient and family and they are agreeable to the transition plan. The Plan for Transition of Care is related to the following treatment goals: PT/OT    The Patient and/or patient representative was provided with a choice of provider and agrees  with the discharge plan.      Yes [x] No []    A Freedom of choice list was provided with basic  dialogue that supports the patient's individualized plan of care/goals and shares the quality data associated with the providers.       Yes [x] No []    SNF/Rehab Transition:  Patient has been accepted to [facility] SNF/Rehab and meets criteria for admission.   Patient will transported by son and expected to leave at 2pm.    Communication to SNF/Rehab:  Bedside RN, Tuyet, has been notified to update the transition plan to the facility and call report (phone number).  Discharge information has been updated on the AVS. And communicated to facility via Game[employer]/All Scripts, or CC link.     Discharge instructions to be sent with patient via paper copy.    Nursing Please include all hard scripts for controlled substances, med rec and dc summary, and AVS in packet.     Reviewed and confirmed with facility, [facility], can manage the patient care needs for the following:     Brayden with (X) only those applicable:  Medication:  [x]Medications are available at the facility  []IV Antibiotics    []Controlled Substance - hard copies available sent.  []Weekly Labs    Equipment:  []CPAP/BiPAP  []Wound Vacuum  []Lopez or Urinary Device  []PICC/Central Line  []Nebulizer  []Ventilator    Treatment:  []Isolation (for MRSA, VRE, etc.)  []Surgical Drain Management  []Tracheostomy Care  []Dressing Changes  []Dialysis with transportation  []PEG Care  []Oxygen  []Daily Weights for Heart Failure    Dietary:  []Any diet limitations  []Tube Feedings   []Total Parenteral Management (TPN)    Financial Resources:  []Medicaid Application Completed    []UAI Completed and copy given to pt/family  and copy given to pt/family  []A screening has previously been completed.    []Level II Completed    [] Private pay individual who will not become   financially eligible for Medicaid within 6 months from admission to a [facility].     [] Individual refused to have screening conducted.     []Medicaid Application Completed    [x]The

## 2024-07-11 NOTE — PLAN OF CARE
Problem: Physical Therapy - Adult  Goal: By Discharge: Performs mobility at highest level of function for planned discharge setting.  See evaluation for individualized goals.  Description: FUNCTIONAL STATUS PRIOR TO ADMISSION: Patient reports having SPC and rollator. Sounds like it depends on the day or what he is doing for which device he uses.     HOME SUPPORT PRIOR TO ADMISSION: The patient lives in an independent living apartment at [facility].    Physical Therapy Goals  Initiated 6/28/2024  1.  Patient will move from supine to sit and sit to supine in bed with modified independence within 7 day(s).    2.  Patient will perform sit to stand with modified independence within 7 day(s).  3.  Patient will transfer from bed to chair and chair to bed with modified independence using the least restrictive device within 7 day(s).  4.  Patient will ambulate with modified independence for 300 feet with the least restrictive device within 7 day(s).     Re-assessment 7/10/24  1.  Patient will move from supine to sit and sit to supine in bed with modified independence within 7 day(s).    2.  Patient will perform sit to stand with stand-by assist within 7 day(s).  3.  Patient will transfer from bed to chair and chair to bed with stand-by assist using the least restrictive device within 7 day(s).  4.  Patient will ambulate with stand-by assist for 75 feet with the least restrictive device within 7 day(s).     7/10/2024 1342 by Lisa Mullins, PT  Outcome: Progressing     Problem: Chronic Conditions and Co-morbidities  Goal: Patient's chronic conditions and co-morbidity symptoms are monitored and maintained or improved  7/10/2024 2222 by Lisa Naranjo, RN  Outcome: Progressing  7/10/2024 1058 by Kristel Talley, RN  Outcome: Progressing  Flowsheets (Taken 7/10/2024 0800)  Care Plan - Patient's Chronic Conditions and Co-Morbidity Symptoms are Monitored and Maintained or Improved: Monitor and assess patient's chronic

## 2024-07-11 NOTE — PALLIATIVE CARE DISCHARGE
Goals of Care/Treatment Preferences    The Palliative Medicine team was consulted as part of your/your loved one's care in the hospital. Our team is a supportive service; we strive to relieve suffering and improve quality of life.    We reviewed advance care planning information, which includes the following:    Primary Decision Maker: Og Aleman Jr - Child - [phone]    Secondary Decision Maker: Katie oHlden - Child - [phone]    Patient/Health Care Proxy Stated Goals: Recovery from acute illness    We reviewed / discussed your code status as:   Code Status: Full Code     “Full Code” means perform CPR in the event of cardiac arrest.      “DNR” means do NOT perform CPR in the event of cardiac arrest.      “Partial Code” means you have specific preferences, please discuss with your healthcare team.      “No Order” means this issue was not addressed / resolved during your stay    Medical Interventions: Full interventions  Other Instructions: Please share copies of your updated Advance Medical Directive with your medical providers and appropriate staff at [facility].     Because of the importance of this information, we are providing you with a printed copy to share with other healthcare providers after this hospitalization is complete.    Thank you for including Palliative team in the care of Mr. Og Aleman. We wish you the best!    Criss Moseley, [facility]  Palliative Medicine [phone])

## 2024-07-11 NOTE — PLAN OF CARE
Problem: Physical Therapy - Adult  Goal: By Discharge: Performs mobility at highest level of function for planned discharge setting.  See evaluation for individualized goals.  Description: FUNCTIONAL STATUS PRIOR TO ADMISSION: Patient reports having SPC and rollator. Sounds like it depends on the day or what he is doing for which device he uses.     HOME SUPPORT PRIOR TO ADMISSION: The patient lives in an independent living apartment at [facility].    Physical Therapy Goals  Initiated 6/28/2024  1.  Patient will move from supine to sit and sit to supine in bed with modified independence within 7 day(s).    2.  Patient will perform sit to stand with modified independence within 7 day(s).  3.  Patient will transfer from bed to chair and chair to bed with modified independence using the least restrictive device within 7 day(s).  4.  Patient will ambulate with modified independence for 300 feet with the least restrictive device within 7 day(s).     Re-assessment 7/10/24  1.  Patient will move from supine to sit and sit to supine in bed with modified independence within 7 day(s).    2.  Patient will perform sit to stand with stand-by assist within 7 day(s).  3.  Patient will transfer from bed to chair and chair to bed with stand-by assist using the least restrictive device within 7 day(s).  4.  Patient will ambulate with stand-by assist for 75 feet with the least restrictive device within 7 day(s).     7/11/2024 1130 by Yudith Murphy, PT  Outcome: Progressing     Problem: Chronic Conditions and Co-morbidities  Goal: Patient's chronic conditions and co-morbidity symptoms are monitored and maintained or improved  Outcome: Adequate for Discharge     Problem: Respiratory - Adult  Goal: Achieves optimal ventilation and oxygenation  Outcome: Adequate for Discharge

## 2024-07-11 NOTE — PLAN OF CARE
Problem: Physical Therapy - Adult  Goal: By Discharge: Performs mobility at highest level of function for planned discharge setting.  See evaluation for individualized goals.  Description: FUNCTIONAL STATUS PRIOR TO ADMISSION: Patient reports having SPC and rollator. Sounds like it depends on the day or what he is doing for which device he uses.     HOME SUPPORT PRIOR TO ADMISSION: The patient lives in an independent living apartment at [facility].    Physical Therapy Goals  Initiated 6/28/2024  1.  Patient will move from supine to sit and sit to supine in bed with modified independence within 7 day(s).    2.  Patient will perform sit to stand with modified independence within 7 day(s).  3.  Patient will transfer from bed to chair and chair to bed with modified independence using the least restrictive device within 7 day(s).  4.  Patient will ambulate with modified independence for 300 feet with the least restrictive device within 7 day(s).     Re-assessment 7/10/24  1.  Patient will move from supine to sit and sit to supine in bed with modified independence within 7 day(s).    2.  Patient will perform sit to stand with stand-by assist within 7 day(s).  3.  Patient will transfer from bed to chair and chair to bed with stand-by assist using the least restrictive device within 7 day(s).  4.  Patient will ambulate with stand-by assist for 75 feet with the least restrictive device within 7 day(s).     Outcome: Progressing   PHYSICAL THERAPY TREATMENT    Patient: Og Aleman (89 y.o. male)  Date: 7/11/2024  Diagnosis: Diabetic foot ulcer with osteomyelitis (HCC) [E11.621, E11.69, L97.509, M86.9]  Acute osteomyelitis of right foot (HCC) [M86.171] Diabetic foot ulcer with osteomyelitis (HCC)  Procedure(s) (LRB):  Insert or replace transcath PPM leadless (N/A)  Ablation AV node (N/A) 3 Days Post-Op  Precautions: Fall Risk                      ASSESSMENT:  Patient continues to benefit from skilled PT services and is

## 2024-07-11 NOTE — PROGRESS NOTES
0700 Bedside and Verbal shift change report given to Tuyet RN (oncoming nurse) by Adriana RN/ Lisa RN (offgoing nurse). Report included the following information Nurse Handoff Report, Index, ED Encounter Summary, ED SBAR, Intake/Output, MAR, Recent Results, and Cardiac Rhythm Ventricular Paced .     1220 Attempted to call report to Estephania Smith, received no answer. Left HIPAA compliant voicemail with unit's call back number    0244 Verbal SBAR report given to LPN from [facility]. Hosp SNF note completed and placed into patient's chart    Reviewed discharge instructions and education with patient and patient's son. Allowed opportunity for questions. Patient discharged to SNF via  family

## 2024-07-11 NOTE — ACP (ADVANCE CARE PLANNING)
GOALS OF CARE:  Patient with decsion making capacity.  Advance directives completed today , in the presence of his son Ash Foley , Patint indicated that if he is close to dying \"all treatments to prolong my life as long as possible within the limits of generally accepted health care standards\" , if he is comatose or not able to interact with surroundings he will want life prolonging treatment for period of 10 days .    Goal is best possible recovery and discharge to rehab, continue with full code status.       TREATMENT PREFERENCES:   Code Status: Full Code    Advance Care Planning:    Primary Decision Maker: Og Aleman Jr - Child - [phone]    Secondary Decision Maker: Katie Holden - Child - [phone]        7/8/2024     1:45 PM   Demographics   Marital Status Single

## 2024-07-11 NOTE — DISCHARGE SUMMARY
Hospitalist Discharge Summary     Patient ID:  Og Aleman  [id number]  89 y.o.  4/28/1931 6/27/2024    PCP on record: Herman Ortiz MD    Admit date: 6/27/2024  Discharge date and time: 7/11/2024    DISCHARGE DIAGNOSIS:    Patient Active Problem List   Diagnosis    Non-pressure chronic ulcer of right lower leg, with fat layer exposed (HCC)    Bilateral leg edema    Diabetic foot ulcer with osteomyelitis (HCC)    Acute osteomyelitis of right foot (HCC)    Permanent atrial fibrillation (HCC)    Tachy-coleen syndrome (HCC)    Palliative care encounter    Hypoxia    Advanced directives, counseling/discussion    Full code status          CONSULTATIONS:  IP CONSULT TO PODIATRY  IP CONSULT TO HOSPITALIST  IP CONSULT TO PODIATRY  IP WOUND CARE NURSE CONSULT TO EVAL  IP CONSULT TO CASE MANAGEMENT  IP CONSULT TO CARDIOLOGY  IP CONSULT TO CASE MANAGEMENT  IP CONSULT TO PALLIATIVE CARE  IP WOUND CARE NURSE CONSULT TO EVAL    Excerpted HPI from H&P of Fabienne Cao MD:    89-year-old male with PMH significant for stroke 4 years ago, chronic atrial fibrillation s/p watchman status for recurrent fall tendency, NICM (subsequent improvement in ejection fraction) and diabetes mellitus.      He was admitted 6/27/24 for sepsis 2/2 skin and soft tissue infection involving right foot, subsequent wound cultures grew Pseudomonas but blood cultures were negative. He has completed Abx therapy. He was evaluated by cardiology for ongoing tachy-coleen episodes and finally had AV melanie ablation & PPM insertion today 7/8/24 early AM.      Raid response was called for sudden worsening of SOB. CXR showed diffuse pul edema. On evaluation, patient is unable to speak. Started on BiPAP. 80 mg IV lasix given. BP on higher side and on exam he has scral edema.     ______________________________________________________________________  DISCHARGE SUMMARY/HOSPITAL COURSE:  for full details see H&P, daily progress notes, labs,

## 2024-07-17 NOTE — PROGRESS NOTES
Physician Progress Note      PATIENT:               JUANIS UP  CSN #:                  [id number]  :                       1931  ADMIT DATE:       2024 12:00 PM  DISCH DATE:        2024 2:55 PM  RESPONDING  PROVIDER #:        Shyla Luna MD          QUERY TEXT:    Dr Valerio  Pt admitted with cellulitis. Noted documentation of \"acute respiratory failure   24 after his procedure earlier today\" in Dr Mendel's note on .? Please   document in progress notes and discharge summary if you are   evaluating/treating any of the following:    The medical record reflects the following:  Risk Factors: HTN, afib  Clinical Indicators: noted on : \"acute respiratory failure 24 after his   procedure earlier today\",  Treatment: transferred to CCU/ICU, bipap, Lasix iv  Options provided:  -- Respiratory failure is not due to the procedure but was due to, Please   specify.  -- Acute Postoperative Pulmonary Insufficiency, Postoperative Respiratory   failure is ruled out  -- Postoperative Respiratory failure is due to the procedure  -- Postoperative Respiratory failure ruled out after study  -- Other - I will add my own diagnosis  -- Disagree - Not applicable / Not valid  -- Disagree - Clinically unable to determine / Unknown  -- Refer to Clinical Documentation Reviewer    PROVIDER RESPONSE TEXT:    Respiratory failure is not due to the procedure but was due to    Query created by: Mandi Mathews on 2024 11:47 AM      Electronically signed by:  Shyla Luna MD 2024 10:06 PM

## 2024-08-02 ENCOUNTER — HOSPITAL ENCOUNTER (OUTPATIENT)
Facility: HOSPITAL | Age: 89
Discharge: HOME | End: 2024-08-02
Attending: PODIATRIST
Payer: MEDICARE

## 2024-08-02 VITALS
TEMPERATURE: 97.1 F | RESPIRATION RATE: 18 BRPM | HEART RATE: 79 BPM | DIASTOLIC BLOOD PRESSURE: 50 MMHG | SYSTOLIC BLOOD PRESSURE: 117 MMHG

## 2024-08-02 DIAGNOSIS — S91.302D OPEN WOUND OF LEFT FOOT, SUBSEQUENT ENCOUNTER: ICD-10-CM

## 2024-08-02 DIAGNOSIS — L03.031 SUBUNGUAL ABSCESS OF TOE, RIGHT: Primary | ICD-10-CM

## 2024-08-02 DIAGNOSIS — S91.301D OPEN WOUND OF RIGHT FOOT, SUBSEQUENT ENCOUNTER: ICD-10-CM

## 2024-08-02 DIAGNOSIS — L03.032 SUBUNGUAL ABSCESS OF TOE, LEFT: ICD-10-CM

## 2024-08-02 PROBLEM — S91.302A OPEN WOUND OF LEFT FOOT: Status: ACTIVE | Noted: 2024-08-02

## 2024-08-02 PROBLEM — S91.301A OPEN WOUND OF RIGHT FOOT: Status: ACTIVE | Noted: 2024-08-02

## 2024-08-02 PROCEDURE — 99213 OFFICE O/P EST LOW 20 MIN: CPT

## 2024-08-02 PROCEDURE — 10060 I&D ABSCESS SIMPLE/SINGLE: CPT

## 2024-08-02 RX ORDER — LIDOCAINE HYDROCHLORIDE 40 MG/ML
SOLUTION TOPICAL ONCE
Status: CANCELLED | OUTPATIENT
Start: 2024-08-02 | End: 2024-08-02

## 2024-08-02 RX ORDER — TRIAMCINOLONE ACETONIDE 1 MG/G
OINTMENT TOPICAL ONCE
Status: CANCELLED | OUTPATIENT
Start: 2024-08-02 | End: 2024-08-02

## 2024-08-02 RX ORDER — LIDOCAINE 40 MG/G
CREAM TOPICAL ONCE
OUTPATIENT
Start: 2024-08-02 | End: 2024-08-02

## 2024-08-02 RX ORDER — CLOBETASOL PROPIONATE 0.5 MG/G
OINTMENT TOPICAL ONCE
OUTPATIENT
Start: 2024-08-02 | End: 2024-08-02

## 2024-08-02 RX ORDER — LIDOCAINE HYDROCHLORIDE 20 MG/ML
JELLY TOPICAL ONCE
Status: CANCELLED | OUTPATIENT
Start: 2024-08-02 | End: 2024-08-02

## 2024-08-02 RX ORDER — GINSENG 100 MG
CAPSULE ORAL ONCE
Status: CANCELLED | OUTPATIENT
Start: 2024-08-02 | End: 2024-08-02

## 2024-08-02 RX ORDER — LIDOCAINE 40 MG/G
CREAM TOPICAL ONCE
Status: CANCELLED | OUTPATIENT
Start: 2024-08-02 | End: 2024-08-02

## 2024-08-02 RX ORDER — BACITRACIN ZINC AND POLYMYXIN B SULFATE 500; 1000 [USP'U]/G; [USP'U]/G
OINTMENT TOPICAL ONCE
Status: CANCELLED | OUTPATIENT
Start: 2024-08-02 | End: 2024-08-02

## 2024-08-02 RX ORDER — OMEPRAZOLE 20 MG/1
20 CAPSULE, DELAYED RELEASE ORAL DAILY
COMMUNITY

## 2024-08-02 RX ORDER — GINSENG 100 MG
CAPSULE ORAL ONCE
OUTPATIENT
Start: 2024-08-02 | End: 2024-08-02

## 2024-08-02 RX ORDER — SODIUM CHLOR/HYPOCHLOROUS ACID 0.033 %
SOLUTION, IRRIGATION IRRIGATION ONCE
OUTPATIENT
Start: 2024-08-02 | End: 2024-08-02

## 2024-08-02 RX ORDER — GENTAMICIN SULFATE 1 MG/G
OINTMENT TOPICAL ONCE
Status: CANCELLED | OUTPATIENT
Start: 2024-08-02 | End: 2024-08-02

## 2024-08-02 RX ORDER — LIDOCAINE HYDROCHLORIDE 20 MG/ML
JELLY TOPICAL ONCE
OUTPATIENT
Start: 2024-08-02 | End: 2024-08-02

## 2024-08-02 RX ORDER — GENTAMICIN SULFATE 1 MG/G
OINTMENT TOPICAL ONCE
OUTPATIENT
Start: 2024-08-02 | End: 2024-08-02

## 2024-08-02 RX ORDER — CLOBETASOL PROPIONATE 0.5 MG/G
OINTMENT TOPICAL ONCE
Status: CANCELLED | OUTPATIENT
Start: 2024-08-02 | End: 2024-08-02

## 2024-08-02 RX ORDER — BETAMETHASONE DIPROPIONATE 0.5 MG/G
CREAM TOPICAL ONCE
OUTPATIENT
Start: 2024-08-02 | End: 2024-08-02

## 2024-08-02 RX ORDER — IBUPROFEN 200 MG
TABLET ORAL ONCE
OUTPATIENT
Start: 2024-08-02 | End: 2024-08-02

## 2024-08-02 RX ORDER — LIDOCAINE HYDROCHLORIDE 40 MG/ML
SOLUTION TOPICAL ONCE
OUTPATIENT
Start: 2024-08-02 | End: 2024-08-02

## 2024-08-02 RX ORDER — TRIAMCINOLONE ACETONIDE 1 MG/G
OINTMENT TOPICAL ONCE
OUTPATIENT
Start: 2024-08-02 | End: 2024-08-02

## 2024-08-02 RX ORDER — BETAMETHASONE DIPROPIONATE 0.5 MG/G
CREAM TOPICAL ONCE
Status: CANCELLED | OUTPATIENT
Start: 2024-08-02 | End: 2024-08-02

## 2024-08-02 RX ORDER — IBUPROFEN 200 MG
TABLET ORAL ONCE
Status: CANCELLED | OUTPATIENT
Start: 2024-08-02 | End: 2024-08-02

## 2024-08-02 RX ORDER — LIDOCAINE 50 MG/G
OINTMENT TOPICAL ONCE
OUTPATIENT
Start: 2024-08-02 | End: 2024-08-02

## 2024-08-02 RX ORDER — SODIUM CHLOR/HYPOCHLOROUS ACID 0.033 %
SOLUTION, IRRIGATION IRRIGATION ONCE
Status: CANCELLED | OUTPATIENT
Start: 2024-08-02 | End: 2024-08-02

## 2024-08-02 RX ORDER — BACITRACIN ZINC AND POLYMYXIN B SULFATE 500; 1000 [USP'U]/G; [USP'U]/G
OINTMENT TOPICAL ONCE
OUTPATIENT
Start: 2024-08-02 | End: 2024-08-02

## 2024-08-02 RX ORDER — LIDOCAINE 50 MG/G
OINTMENT TOPICAL ONCE
Status: CANCELLED | OUTPATIENT
Start: 2024-08-02 | End: 2024-08-02

## 2024-08-02 NOTE — FLOWSHEET NOTE
BP (!) 117/50   Pulse 79   Temp 97.1 °F (36.2 °C) (Temporal)   Resp 18   Post measurements  .5 x .8 x .3

## 2024-08-02 NOTE — FLOWSHEET NOTE
08/02/24 1353   [REMOVED] Wound 08/02/24 Toe (Comment  which one) Plantar;Left   Final Assessment Date/Final Assessment Time: 08/02/24 1504  Date First Assessed/Time First Assessed: 08/02/24 1350   Wound Approximate Age at First Assessment (Weeks): 3 weeks  Primary Wound Type: (c)   Location: Toe (Comment  which one)  Wound Locati...   Wound Etiology Other  (unknown)   Dressing Status   (open to air)   Wound Cleansed Cleansed with saline   Wound Length (cm) 0 cm   Wound Width (cm) 0 cm   Wound Depth (cm) 0 cm   Wound Surface Area (cm^2) 0 cm^2   Wound Volume (cm^3) 0 cm^3   Wound Assessment Epithelialization   Drainage Amount None (dry)   Odor None   Flora-wound Assessment Dry/flaky   Margins Defined edges   Wound Thickness Description not for Pressure Injury Partial thickness     BP (!) 117/50   Pulse 79   Temp 97.1 °F (36.2 °C) (Temporal)   Resp 18

## 2024-08-02 NOTE — WOUND CARE
Debridement Wound Care        Problem List Items Addressed This Visit          Other    Subungual abscess of toe, right - Primary               Open wound of left foot    Subungual abscess of toe, left    Open wound of right foot    Relevant Orders    Initiate Outpatient Wound Care Protocol       Procedure Note  Indications:  Based on my examination of this patient's wound(s)/ulcer(s) today, debridement is  required to promote healing and evaluate the wound base.    Performed by: Francheska Ojeda DPM    Consent obtained: Yes    Time out taken: Yes    Debridement: excisional    Using tissue nippers the wound(s)/ulcer(s) was/were sharply debrided down through and including the removal of    subcutaneous tissue    Devitalized Tissue Debrided: slough and necrotic/eschar    Pre Debridement Measurements:  Are located in the Wound/Ulcer Documentation Flow Sheet    Non-Pressure ulcer, fat layer exposed    Wound/Ulcer #: 1    Post Debridement Measurements:  Wound/Ulcer Descriptions are Pre Debridement except measurements:    Wound Care Documentation:  Wound 08/02/24 Toe (Comment  which one) right great toe (Active)   Wound Etiology Other 08/02/24 1412   Dressing Status Other (Comment) 08/02/24 1412   Dressing/Treatment Adhesive bandage 08/02/24 1412   Post-Procedure Length (cm) 0.5 cm 08/02/24 1412   Post-Procedure Width (cm) 0.8 cm 08/02/24 1412   Post-Procedure Depth (cm) 0.3 cm 08/02/24 1412   Post-Procedure Surface Area (cm^2) 0.4 cm^2 08/02/24 1412   Post-Procedure Volume (cm^3) 0.12 cm^3 08/02/24 1412   Wound Assessment Pink/red 08/02/24 1412   Drainage Amount Small (< 25%) 08/02/24 1412   Drainage Description Sanguinous 08/02/24 1412   Odor None 08/02/24 1412   Flora-wound Assessment Intact 08/02/24 1412   Margins Defined edges 08/02/24 1412   Wound Thickness Description not for Pressure Injury Full thickness 08/02/24 1412   Number of days: 0         Total Surface Area Debrided:  .4sq cm     Estimated Blood Loss:

## 2024-08-02 NOTE — PROGRESS NOTES
[facility]   Progress Note and Procedure Note   NO Procedure      Og Aleman  MEDICAL RECORD NUMBER:  [id number]  AGE: 89 y.o. RACE White (non-)  GENDER: male  : 1931  EPISODE DATE:  2024    Subjective:     Chief Complaint   Patient presents with    Wound Check     Right foot wound         HISTORY of PRESENT ILLNESS HPI    Og Aleman is a 89 y.o. male who presents today for wound/ulcer evaluation.   Wound/Ulcer Pain Timing/Severity: none  Quality of pain: N/A        Ulcer Identification:  Ulcer Type: undetermined    Contributing Factors: venous stasis and decreased mobility        PAST MEDICAL HISTORY    Past Medical History:   Diagnosis Date    Arrhythmia     a-fib    Hypertension     Stroke (HCC)         PAST SURGICAL HISTORY    Past Surgical History:   Procedure Laterality Date    EP DEVICE PROCEDURE N/A 2024    Insert or replace transcath PPM leadless performed by Cal Tobar MD at [facility] C[facility]ATH [facility]    EP DEVICE PROCEDURE N/A 2024    Ablation AV node performed by Cal Tobar MD at [facility] CARD[facility] CATH [facility]    OTHER SURGICAL HISTORY  2021    Watchman placement    PACEMAKER      UROLOGICAL SURGERY  2016    bladder stone removal       FAMILY HISTORY    History reviewed. No pertinent family history.    SOCIAL HISTORY    Social History     Tobacco Use    Smoking status: Former     Current packs/day: 0.00     Types: Cigarettes     Quit date: 1995     Years since quittin.6    Smokeless tobacco: Never   Substance Use Topics    Alcohol use: Yes     Alcohol/week: 1.0 standard drink of alcohol    Drug use: Never       ALLERGIES    No Known Allergies    MEDICATIONS    Current Outpatient Medications on File Prior to Encounter   Medication Sig Dispense Refill    omeprazole (PRILOSEC) 20 MG delayed release capsule Take 1 capsule by mouth daily      ammonium lactate (LAC-HYDRIN) 12 % lotion Apply topically as needed. 225 g 0

## 2024-08-10 ENCOUNTER — APPOINTMENT (OUTPATIENT)
Facility: HOSPITAL | Age: 89
DRG: 291 | End: 2024-08-10
Payer: MEDICARE

## 2024-08-10 ENCOUNTER — HOSPITAL ENCOUNTER (INPATIENT)
Facility: HOSPITAL | Age: 89
LOS: 5 days | Discharge: NURSING, REHAB OR LONG-TERM CARE | DRG: 291 | End: 2024-08-15
Attending: EMERGENCY MEDICINE | Admitting: INTERNAL MEDICINE
Payer: MEDICARE

## 2024-08-10 DIAGNOSIS — J81.0 ACUTE PULMONARY EDEMA (HCC): ICD-10-CM

## 2024-08-10 DIAGNOSIS — I50.9 ACUTE ON CHRONIC CONGESTIVE HEART FAILURE, UNSPECIFIED HEART FAILURE TYPE (HCC): ICD-10-CM

## 2024-08-10 DIAGNOSIS — R06.03 ACUTE RESPIRATORY DISTRESS: Primary | ICD-10-CM

## 2024-08-10 PROBLEM — I50.23 ACUTE ON CHRONIC CLINICAL SYSTOLIC HEART FAILURE (HCC): Status: ACTIVE | Noted: 2024-08-10

## 2024-08-10 LAB
ALBUMIN SERPL-MCNC: 3.6 G/DL (ref 3.5–5)
ALBUMIN/GLOB SERPL: 0.9 (ref 1.1–2.2)
ALP SERPL-CCNC: 113 U/L (ref 45–117)
ALT SERPL-CCNC: 35 U/L (ref 12–78)
ANION GAP SERPL CALC-SCNC: 6 MMOL/L (ref 5–15)
AST SERPL-CCNC: 58 U/L (ref 15–37)
BASOPHILS # BLD: 0.1 K/UL (ref 0–0.1)
BASOPHILS NFR BLD: 1 % (ref 0–1)
BILIRUB SERPL-MCNC: 1 MG/DL (ref 0.2–1)
BUN SERPL-MCNC: 18 MG/DL (ref 6–20)
BUN/CREAT SERPL: 16 (ref 12–20)
CALCIUM SERPL-MCNC: 9.2 MG/DL (ref 8.5–10.1)
CHLORIDE SERPL-SCNC: 110 MMOL/L (ref 97–108)
CO2 SERPL-SCNC: 22 MMOL/L (ref 21–32)
CREAT SERPL-MCNC: 1.12 MG/DL (ref 0.7–1.3)
DIFFERENTIAL METHOD BLD: ABNORMAL
EOSINOPHIL # BLD: 0.2 K/UL (ref 0–0.4)
EOSINOPHIL NFR BLD: 2 % (ref 0–7)
ERYTHROCYTE [DISTWIDTH] IN BLOOD BY AUTOMATED COUNT: 13.6 % (ref 11.5–14.5)
GLOBULIN SER CALC-MCNC: 4.1 G/DL (ref 2–4)
GLUCOSE SERPL-MCNC: 217 MG/DL (ref 65–100)
HCT VFR BLD AUTO: 45.1 % (ref 36.6–50.3)
HGB BLD-MCNC: 14.4 G/DL (ref 12.1–17)
IMM GRANULOCYTES # BLD AUTO: 0 K/UL (ref 0–0.04)
IMM GRANULOCYTES NFR BLD AUTO: 0 % (ref 0–0.5)
LYMPHOCYTES # BLD: 3.5 K/UL (ref 0.8–3.5)
LYMPHOCYTES NFR BLD: 34 % (ref 12–49)
MCH RBC QN AUTO: 32 PG (ref 26–34)
MCHC RBC AUTO-ENTMCNC: 31.9 G/DL (ref 30–36.5)
MCV RBC AUTO: 100.2 FL (ref 80–99)
MONOCYTES # BLD: 0.7 K/UL (ref 0–1)
MONOCYTES NFR BLD: 6 % (ref 5–13)
NEUTS SEG # BLD: 5.9 K/UL (ref 1.8–8)
NEUTS SEG NFR BLD: 57 % (ref 32–75)
NRBC # BLD: 0 K/UL (ref 0–0.01)
NRBC BLD-RTO: 0 PER 100 WBC
NT PRO BNP: 2520 PG/ML
PLATELET # BLD AUTO: 162 K/UL (ref 150–400)
PMV BLD AUTO: 12.2 FL (ref 8.9–12.9)
POTASSIUM SERPL-SCNC: 4.6 MMOL/L (ref 3.5–5.1)
PROT SERPL-MCNC: 7.7 G/DL (ref 6.4–8.2)
RBC # BLD AUTO: 4.5 M/UL (ref 4.1–5.7)
SODIUM SERPL-SCNC: 138 MMOL/L (ref 136–145)
TROPONIN I SERPL HS-MCNC: 28 NG/L (ref 0–76)
WBC # BLD AUTO: 10.5 K/UL (ref 4.1–11.1)

## 2024-08-10 PROCEDURE — 83880 ASSAY OF NATRIURETIC PEPTIDE: CPT

## 2024-08-10 PROCEDURE — 84484 ASSAY OF TROPONIN QUANT: CPT

## 2024-08-10 PROCEDURE — 2700000000 HC OXYGEN THERAPY PER DAY

## 2024-08-10 PROCEDURE — 2580000003 HC RX 258: Performed by: NURSE PRACTITIONER

## 2024-08-10 PROCEDURE — 94660 CPAP INITIATION&MGMT: CPT

## 2024-08-10 PROCEDURE — 80053 COMPREHEN METABOLIC PANEL: CPT

## 2024-08-10 PROCEDURE — 5A09357 ASSISTANCE WITH RESPIRATORY VENTILATION, LESS THAN 24 CONSECUTIVE HOURS, CONTINUOUS POSITIVE AIRWAY PRESSURE: ICD-10-PCS | Performed by: INTERNAL MEDICINE

## 2024-08-10 PROCEDURE — 1100000000 HC RM PRIVATE

## 2024-08-10 PROCEDURE — 85025 COMPLETE CBC W/AUTO DIFF WBC: CPT

## 2024-08-10 PROCEDURE — 96374 THER/PROPH/DIAG INJ IV PUSH: CPT

## 2024-08-10 PROCEDURE — 6360000002 HC RX W HCPCS: Performed by: EMERGENCY MEDICINE

## 2024-08-10 PROCEDURE — 93005 ELECTROCARDIOGRAM TRACING: CPT | Performed by: EMERGENCY MEDICINE

## 2024-08-10 PROCEDURE — 99285 EMERGENCY DEPT VISIT HI MDM: CPT

## 2024-08-10 PROCEDURE — 36415 COLL VENOUS BLD VENIPUNCTURE: CPT

## 2024-08-10 PROCEDURE — 71045 X-RAY EXAM CHEST 1 VIEW: CPT

## 2024-08-10 RX ORDER — ONDANSETRON 2 MG/ML
4 INJECTION INTRAMUSCULAR; INTRAVENOUS EVERY 6 HOURS PRN
Status: DISCONTINUED | OUTPATIENT
Start: 2024-08-10 | End: 2024-08-15 | Stop reason: HOSPADM

## 2024-08-10 RX ORDER — INSULIN LISPRO 100 [IU]/ML
0-8 INJECTION, SOLUTION INTRAVENOUS; SUBCUTANEOUS EVERY 6 HOURS
Status: DISCONTINUED | OUTPATIENT
Start: 2024-08-10 | End: 2024-08-12

## 2024-08-10 RX ORDER — FINASTERIDE 5 MG/1
5 TABLET, FILM COATED ORAL DAILY
Status: DISCONTINUED | OUTPATIENT
Start: 2024-08-11 | End: 2024-08-15 | Stop reason: HOSPADM

## 2024-08-10 RX ORDER — DILTIAZEM HYDROCHLORIDE 60 MG/1
60 CAPSULE, EXTENDED RELEASE ORAL 2 TIMES DAILY
Status: DISCONTINUED | OUTPATIENT
Start: 2024-08-11 | End: 2024-08-15 | Stop reason: HOSPADM

## 2024-08-10 RX ORDER — PANTOPRAZOLE SODIUM 40 MG/1
40 TABLET, DELAYED RELEASE ORAL
Status: DISCONTINUED | OUTPATIENT
Start: 2024-08-11 | End: 2024-08-15 | Stop reason: HOSPADM

## 2024-08-10 RX ORDER — ASPIRIN 81 MG/1
81 TABLET ORAL DAILY
Status: DISCONTINUED | OUTPATIENT
Start: 2024-08-11 | End: 2024-08-15 | Stop reason: HOSPADM

## 2024-08-10 RX ORDER — ACETAMINOPHEN 650 MG/1
650 SUPPOSITORY RECTAL EVERY 6 HOURS PRN
Status: DISCONTINUED | OUTPATIENT
Start: 2024-08-10 | End: 2024-08-15 | Stop reason: HOSPADM

## 2024-08-10 RX ORDER — POLYETHYLENE GLYCOL 3350 17 G/17G
17 POWDER, FOR SOLUTION ORAL DAILY PRN
Status: DISCONTINUED | OUTPATIENT
Start: 2024-08-10 | End: 2024-08-15 | Stop reason: HOSPADM

## 2024-08-10 RX ORDER — ENOXAPARIN SODIUM 100 MG/ML
40 INJECTION SUBCUTANEOUS DAILY
Status: DISCONTINUED | OUTPATIENT
Start: 2024-08-11 | End: 2024-08-15 | Stop reason: HOSPADM

## 2024-08-10 RX ORDER — SODIUM CHLORIDE 0.9 % (FLUSH) 0.9 %
5-40 SYRINGE (ML) INJECTION EVERY 12 HOURS SCHEDULED
Status: DISCONTINUED | OUTPATIENT
Start: 2024-08-10 | End: 2024-08-15 | Stop reason: HOSPADM

## 2024-08-10 RX ORDER — TAMSULOSIN HYDROCHLORIDE 0.4 MG/1
0.4 CAPSULE ORAL DAILY
Status: DISCONTINUED | OUTPATIENT
Start: 2024-08-11 | End: 2024-08-15 | Stop reason: HOSPADM

## 2024-08-10 RX ORDER — SODIUM CHLORIDE 0.9 % (FLUSH) 0.9 %
5-40 SYRINGE (ML) INJECTION PRN
Status: DISCONTINUED | OUTPATIENT
Start: 2024-08-10 | End: 2024-08-15 | Stop reason: HOSPADM

## 2024-08-10 RX ORDER — ONDANSETRON 4 MG/1
4 TABLET, ORALLY DISINTEGRATING ORAL EVERY 8 HOURS PRN
Status: DISCONTINUED | OUTPATIENT
Start: 2024-08-10 | End: 2024-08-15 | Stop reason: HOSPADM

## 2024-08-10 RX ORDER — ACETAMINOPHEN 325 MG/1
650 TABLET ORAL EVERY 6 HOURS PRN
Status: DISCONTINUED | OUTPATIENT
Start: 2024-08-10 | End: 2024-08-15 | Stop reason: HOSPADM

## 2024-08-10 RX ORDER — FUROSEMIDE 10 MG/ML
80 INJECTION INTRAMUSCULAR; INTRAVENOUS
Status: COMPLETED | OUTPATIENT
Start: 2024-08-10 | End: 2024-08-10

## 2024-08-10 RX ORDER — DILTIAZEM HYDROCHLORIDE 60 MG/1
60 CAPSULE, EXTENDED RELEASE ORAL 2 TIMES DAILY
Status: DISCONTINUED | OUTPATIENT
Start: 2024-08-11 | End: 2024-08-10

## 2024-08-10 RX ORDER — SODIUM CHLORIDE 9 MG/ML
INJECTION, SOLUTION INTRAVENOUS PRN
Status: DISCONTINUED | OUTPATIENT
Start: 2024-08-10 | End: 2024-08-15 | Stop reason: HOSPADM

## 2024-08-10 RX ADMIN — FUROSEMIDE 80 MG: 10 INJECTION, SOLUTION INTRAMUSCULAR; INTRAVENOUS at 21:15

## 2024-08-10 RX ADMIN — SODIUM CHLORIDE, PRESERVATIVE FREE 10 ML: 5 INJECTION INTRAVENOUS at 23:39

## 2024-08-10 ASSESSMENT — PAIN - FUNCTIONAL ASSESSMENT: PAIN_FUNCTIONAL_ASSESSMENT: NONE - DENIES PAIN

## 2024-08-10 ASSESSMENT — LIFESTYLE VARIABLES
HOW OFTEN DO YOU HAVE A DRINK CONTAINING ALCOHOL: NEVER
HOW MANY STANDARD DRINKS CONTAINING ALCOHOL DO YOU HAVE ON A TYPICAL DAY: PATIENT DOES NOT DRINK

## 2024-08-11 LAB
ALBUMIN SERPL-MCNC: 3.3 G/DL (ref 3.5–5)
ALBUMIN/GLOB SERPL: 0.9 (ref 1.1–2.2)
ALP SERPL-CCNC: 96 U/L (ref 45–117)
ALT SERPL-CCNC: 31 U/L (ref 12–78)
ANION GAP SERPL CALC-SCNC: 7 MMOL/L (ref 5–15)
AST SERPL-CCNC: 30 U/L (ref 15–37)
BILIRUB DIRECT SERPL-MCNC: 0.3 MG/DL (ref 0–0.2)
BILIRUB SERPL-MCNC: 1.1 MG/DL (ref 0.2–1)
BUN SERPL-MCNC: 18 MG/DL (ref 6–20)
BUN/CREAT SERPL: 17 (ref 12–20)
CALCIUM SERPL-MCNC: 8.8 MG/DL (ref 8.5–10.1)
CHLORIDE SERPL-SCNC: 108 MMOL/L (ref 97–108)
CO2 SERPL-SCNC: 26 MMOL/L (ref 21–32)
CREAT SERPL-MCNC: 1.04 MG/DL (ref 0.7–1.3)
EKG ATRIAL RATE: 74 BPM
EKG DIAGNOSIS: NORMAL
EKG Q-T INTERVAL: 404 MS
EKG QRS DURATION: 158 MS
EKG QTC CALCULATION (BAZETT): 465 MS
EKG R AXIS: 110 DEGREES
EKG T AXIS: -32 DEGREES
EKG VENTRICULAR RATE: 80 BPM
ERYTHROCYTE [DISTWIDTH] IN BLOOD BY AUTOMATED COUNT: 13.6 % (ref 11.5–14.5)
GLOBULIN SER CALC-MCNC: 3.7 G/DL (ref 2–4)
GLUCOSE BLD STRIP.AUTO-MCNC: 103 MG/DL (ref 65–117)
GLUCOSE BLD STRIP.AUTO-MCNC: 105 MG/DL (ref 65–117)
GLUCOSE BLD STRIP.AUTO-MCNC: 106 MG/DL (ref 65–117)
GLUCOSE BLD STRIP.AUTO-MCNC: 115 MG/DL (ref 65–117)
GLUCOSE SERPL-MCNC: 122 MG/DL (ref 65–100)
HCT VFR BLD AUTO: 41.4 % (ref 36.6–50.3)
HGB BLD-MCNC: 13 G/DL (ref 12.1–17)
MAGNESIUM SERPL-MCNC: 2 MG/DL (ref 1.6–2.4)
MCH RBC QN AUTO: 31.4 PG (ref 26–34)
MCHC RBC AUTO-ENTMCNC: 31.4 G/DL (ref 30–36.5)
MCV RBC AUTO: 100 FL (ref 80–99)
NRBC # BLD: 0 K/UL (ref 0–0.01)
NRBC BLD-RTO: 0 PER 100 WBC
PHOSPHATE SERPL-MCNC: 3.1 MG/DL (ref 2.6–4.7)
PLATELET # BLD AUTO: 143 K/UL (ref 150–400)
PMV BLD AUTO: 11.8 FL (ref 8.9–12.9)
POTASSIUM SERPL-SCNC: 3.8 MMOL/L (ref 3.5–5.1)
PROT SERPL-MCNC: 7 G/DL (ref 6.4–8.2)
RBC # BLD AUTO: 4.14 M/UL (ref 4.1–5.7)
SERVICE CMNT-IMP: NORMAL
SODIUM SERPL-SCNC: 141 MMOL/L (ref 136–145)
WBC # BLD AUTO: 8.6 K/UL (ref 4.1–11.1)

## 2024-08-11 PROCEDURE — 80048 BASIC METABOLIC PNL TOTAL CA: CPT

## 2024-08-11 PROCEDURE — 80076 HEPATIC FUNCTION PANEL: CPT

## 2024-08-11 PROCEDURE — 85027 COMPLETE CBC AUTOMATED: CPT

## 2024-08-11 PROCEDURE — 6360000002 HC RX W HCPCS: Performed by: INTERNAL MEDICINE

## 2024-08-11 PROCEDURE — 83735 ASSAY OF MAGNESIUM: CPT

## 2024-08-11 PROCEDURE — 82962 GLUCOSE BLOOD TEST: CPT

## 2024-08-11 PROCEDURE — 2000000000 HC ICU R&B

## 2024-08-11 PROCEDURE — 6360000002 HC RX W HCPCS: Performed by: NURSE PRACTITIONER

## 2024-08-11 PROCEDURE — 36415 COLL VENOUS BLD VENIPUNCTURE: CPT

## 2024-08-11 PROCEDURE — 94660 CPAP INITIATION&MGMT: CPT

## 2024-08-11 PROCEDURE — 6370000000 HC RX 637 (ALT 250 FOR IP): Performed by: NURSE PRACTITIONER

## 2024-08-11 PROCEDURE — 5A0935A ASSISTANCE WITH RESPIRATORY VENTILATION, LESS THAN 24 CONSECUTIVE HOURS, HIGH NASAL FLOW/VELOCITY: ICD-10-PCS | Performed by: INTERNAL MEDICINE

## 2024-08-11 PROCEDURE — 84100 ASSAY OF PHOSPHORUS: CPT

## 2024-08-11 PROCEDURE — 2580000003 HC RX 258: Performed by: NURSE PRACTITIONER

## 2024-08-11 RX ORDER — FUROSEMIDE 10 MG/ML
40 INJECTION INTRAMUSCULAR; INTRAVENOUS 3 TIMES DAILY
Status: DISCONTINUED | OUTPATIENT
Start: 2024-08-11 | End: 2024-08-12

## 2024-08-11 RX ORDER — CASTOR OIL AND BALSAM, PERU 788; 87 MG/G; MG/G
OINTMENT TOPICAL 2 TIMES DAILY
Status: DISCONTINUED | OUTPATIENT
Start: 2024-08-11 | End: 2024-08-15 | Stop reason: HOSPADM

## 2024-08-11 RX ADMIN — FUROSEMIDE 40 MG: 10 INJECTION, SOLUTION INTRAMUSCULAR; INTRAVENOUS at 15:23

## 2024-08-11 RX ADMIN — ASPIRIN 81 MG: 81 TABLET, COATED ORAL at 09:09

## 2024-08-11 RX ADMIN — DILTIAZEM HYDROCHLORIDE 60 MG: 60 CAPSULE, EXTENDED RELEASE ORAL at 09:09

## 2024-08-11 RX ADMIN — FUROSEMIDE 40 MG: 10 INJECTION, SOLUTION INTRAMUSCULAR; INTRAVENOUS at 20:14

## 2024-08-11 RX ADMIN — Medication: at 19:32

## 2024-08-11 RX ADMIN — Medication 1 AMPULE: at 09:19

## 2024-08-11 RX ADMIN — DILTIAZEM HYDROCHLORIDE 60 MG: 60 CAPSULE, EXTENDED RELEASE ORAL at 20:13

## 2024-08-11 RX ADMIN — FUROSEMIDE 40 MG: 10 INJECTION, SOLUTION INTRAMUSCULAR; INTRAVENOUS at 10:46

## 2024-08-11 RX ADMIN — SODIUM CHLORIDE, PRESERVATIVE FREE 10 ML: 5 INJECTION INTRAVENOUS at 19:33

## 2024-08-11 RX ADMIN — Medication 1 AMPULE: at 19:32

## 2024-08-11 RX ADMIN — FINASTERIDE 5 MG: 5 TABLET, FILM COATED ORAL at 09:09

## 2024-08-11 RX ADMIN — Medication: at 09:18

## 2024-08-11 RX ADMIN — SODIUM CHLORIDE, PRESERVATIVE FREE 10 ML: 5 INJECTION INTRAVENOUS at 09:19

## 2024-08-11 RX ADMIN — TAMSULOSIN HYDROCHLORIDE 0.4 MG: 0.4 CAPSULE ORAL at 09:09

## 2024-08-11 RX ADMIN — ENOXAPARIN SODIUM 40 MG: 100 INJECTION SUBCUTANEOUS at 09:18

## 2024-08-11 ASSESSMENT — PAIN SCALES - GENERAL: PAINLEVEL_OUTOF10: 0

## 2024-08-11 NOTE — ED PROVIDER NOTES
pulmonary edema.    EKG obtained at 8:56 PM interpreted by me ventricular paced rhythm rate 80, no acute ST changes.      10:12 PM  Patient has had 80 mg of Lasix has had approximately 700 mL of urine output.  Patient's breathing has significantly improved.  Will trial off BiPAP.    About 10mins off BiPAP, pt noticed increase chest congestion, increase work of breathing, pt placed back on BiPAP.     Consult:  Case discussed  with Intensivist. Pt to be evaluated and admitted to ICU.     CBC CMP troponin unremarkable.  Patient BNP elevated.    FINAL IMPRESSION     1. Acute respiratory distress    2. Acute pulmonary edema (HCC)    3. Acute on chronic congestive heart failure, unspecified heart failure type (HCC)          DISPOSITION/PLAN   Og Aleman's  results have been reviewed with him.  He has been counseled regarding his diagnosis, treatment, and plan.  He verbally conveys understanding and agreement of the signs, symptoms, diagnosis, treatment and prognosis and additionally agrees to follow up as discussed.  He also agrees with the care-plan and conveys that all of his questions have been answered.     CLINICAL IMPRESSION    Admit Note: Pt is being admitted by none. The results of their tests and reason(s) for their admission have been discussed with pt and/or available family. They convey agreement and understanding for the need to be admitted and for the admission diagnosis.           I am the Primary Clinician of Record.   Herman Clark DO (electronically signed)    (Please note that parts of this dictation were completed with voice recognition software. Quite often unanticipated grammatical, syntax, homophones, and other interpretive errors are inadvertently transcribed by the computer software. Please disregards these errors. Please excuse any errors that have escaped final proofreading.)          Herman Clark DO  08/11/24 8156

## 2024-08-11 NOTE — ED TRIAGE NOTES
EMS from [facility] for c/o congestion, shortness of breath after power went out for 2 hours. Not on home o2. Pt arrives on 4L NC sats 96%. Tachypnea noted. Sats mid 80s on EMS arrival per their report. Pt had pacemaker placed 3 weeks ago.

## 2024-08-11 NOTE — PROGRESS NOTES
ICU Progress Note        Subjective:    - sitting comfortably on the bed.       Vital Signs:    BP (!) 152/79   Pulse 80   Temp 97.8 °F (36.6 °C) (Axillary)   Resp 25   Ht 1.676 m (5' 6\")   Wt 74.8 kg (165 lb)   SpO2 99%   BMI 26.63 kg/m²             Temp (24hrs), Av.7 °F (36.5 °C), Min:97.4 °F (36.3 °C), Max:97.9 °F (36.6 °C)       Intake/Output:   Last shift:      No intake/output data recorded.  Last 3 shifts:  1901 -  0700  In: -   Out: 1350 [Urine:1350]    Intake/Output Summary (Last 24 hours) at 2024 0944  Last data filed at 2024 0700  Gross per 24 hour   Intake --   Output 1350 ml   Net -1350 ml     Physical Exam:    General: Not in acute distress  HEENT:  Anicteric sclerae; pink palpebral conjunctivae; mucosa moist  Resp:  Bilateral air entry +, no crackles or wheeze  CV:  S1, S2 present  GI:  Abdomen soft, non-tender; (+) active bowel sounds  Extremities:  +2 pulses on all extremities; no edema/ cyanosis/ clubbing noted  Skin:  Warm; no rashes/ lesions noted  Neurologic:  Non-focal    DATA:     Current Facility-Administered Medications   Medication Dose Route Frequency    balsum peru-castor oil (VENELEX) ointment   Topical BID    alcohol 62% (NOZIN) nasal  1 ampule  1 ampule Nasal Q12H    sodium chloride flush 0.9 % injection 5-40 mL  5-40 mL IntraVENous 2 times per day    sodium chloride flush 0.9 % injection 5-40 mL  5-40 mL IntraVENous PRN    0.9 % sodium chloride infusion   IntraVENous PRN    enoxaparin (LOVENOX) injection 40 mg  40 mg SubCUTAneous Daily    ondansetron (ZOFRAN-ODT) disintegrating tablet 4 mg  4 mg Oral Q8H PRN    Or    ondansetron (ZOFRAN) injection 4 mg  4 mg IntraVENous Q6H PRN    polyethylene glycol (GLYCOLAX) packet 17 g  17 g Oral Daily PRN    acetaminophen (TYLENOL) tablet 650 mg  650 mg Oral Q6H PRN    Or    acetaminophen (TYLENOL) suppository 650 mg  650 mg Rectal Q6H PRN    insulin lispro (HUMALOG,ADMELOG) injection vial 0-8 Units

## 2024-08-11 NOTE — PROGRESS NOTES
End of Shift Note    Bedside shift change report given to violeta ordaz (oncoming nurse) by Blanka Maguire RN (offgoing nurse).  Report included the following information SBAR, Intake/Output, MAR, Recent Results, and Cardiac Rhythm Flutter, afib, vpaced    Shift worked:  0700 - 1900       Shift summary and any significant changes:     Pt able to transition from HHF to NC, pt being diuresed, now on diet, bipap if needed at night     Concerns for physician to address:  Pt/ot consult     Zone phone for oncoming shift:          Activity:     Number times ambulated in hallways past shift: 0  Number of times OOB to chair past shift: 1    Cardiac:   Cardiac Monitoring: Yes           Access:  Current line(s): PIV     Genitourinary:   Urinary status: voiding and external catheter    Respiratory:      Chronic home O2 use?: NO  Incentive spirometer at bedside: NO       GI:     Current diet:  ADULT DIET; Regular; 4 carb choices (60 gm/meal)  Passing flatus: YES  Tolerating current diet: YES       Pain Management:   Patient states pain is manageable on current regimen: N/A    Skin:     Interventions: specialty bed, float heels, increase time out of bed, and internal/external urinary devices    Patient Safety:  Fall Score:    Interventions: bed/chair alarm, gripper socks, and pt to call before getting OOB       Length of Stay:  Expected LOS: 5  Actual LOS: 1      Blanka Maguire RN

## 2024-08-11 NOTE — H&P
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 CXR with pulmonary edema, placed on NIPPV and administered 80 mg lasix with ~700 cc urine output, with improvement in breathing, failed trial off Bipap d/t tachypnea/hypoxia, therefore ICU consulted for admission.    ICU evaluation: Awake/alert x 4, NIPPV ,50 %, with Spo2 mid 90s. Endorses increased BLE edema, was recently seen at cardiologist last week his home dose of 20 mg Lasix daily was increased to 40 mg daily x 3 days. Reports he had a OP TTE performed yesterday.     COMPREHENSIVE ASSESSMENT & PLAN:SYSTEM BASED     Acute hypoxic respiratory failure in setting of CHF exacerbation  CXR with pulmonary edema, small right effusion  Continue NIPPV, wean as tolerated currently on , 50 %  Continue to diurese as renal function tolerates  TTE with EF (45-50 %) on 24  Cardiology consult in a.m.    Acute kidney injury  Creatinine 1.12 slightly increased from previous 0.86  Prerenal in setting of low flow state/CHF  Goal urine output > 0.5 cc/kg/hr, Strict I/Os, avoid nephrotoxins    CODE STATUS   Code Status: Full (advance directive at bedside)  Patient/Family Updated: Patient and son updated at bedside 8/10/2024    SUBJECTIVE   Review of Systems   Constitutional:  Positive for fatigue.   HENT: Negative.     Eyes: Negative.    Respiratory:  Positive for shortness of breath.

## 2024-08-11 NOTE — PROGRESS NOTES
2354: TRANSFER - IN REPORT:  Verbal report received from Tresa VERDUGO on Og Aleman being received from ED.  Report consisted of patient’s Situation, Background, Assessment and Recommendations(SBAR).   Information from the following report(s) SBAR, Intake/Output, Recent Results, and Cardiac Rhythm V Paced  was reviewed. Opportunity for questions and clarification was provided.      0020: Patient arrived to 2508 on tele and BiPAP. RT and RN at bedside. Belongings at bedside. Patient assessed. Wallet and emergency service necklace device given to son, Og Aleman Jr. 2 bags of clothing hanging in closet. Glasses and cell phone at bedside.     0400: Reassessment completed. See flowsheets    0700: Report given to Blanka VERDUGO

## 2024-08-12 LAB
ANION GAP SERPL CALC-SCNC: 3 MMOL/L (ref 5–15)
BUN SERPL-MCNC: 21 MG/DL (ref 6–20)
BUN/CREAT SERPL: 25 (ref 12–20)
CALCIUM SERPL-MCNC: 8.2 MG/DL (ref 8.5–10.1)
CHLORIDE SERPL-SCNC: 107 MMOL/L (ref 97–108)
CO2 SERPL-SCNC: 28 MMOL/L (ref 21–32)
CREAT SERPL-MCNC: 0.83 MG/DL (ref 0.7–1.3)
ERYTHROCYTE [DISTWIDTH] IN BLOOD BY AUTOMATED COUNT: 13.5 % (ref 11.5–14.5)
GLUCOSE BLD STRIP.AUTO-MCNC: 124 MG/DL (ref 65–117)
GLUCOSE BLD STRIP.AUTO-MCNC: 129 MG/DL (ref 65–117)
GLUCOSE BLD STRIP.AUTO-MCNC: 90 MG/DL (ref 65–117)
GLUCOSE BLD STRIP.AUTO-MCNC: 98 MG/DL (ref 65–117)
GLUCOSE SERPL-MCNC: 107 MG/DL (ref 65–100)
HCT VFR BLD AUTO: 38.8 % (ref 36.6–50.3)
HGB BLD-MCNC: 12.5 G/DL (ref 12.1–17)
MAGNESIUM SERPL-MCNC: 2.1 MG/DL (ref 1.6–2.4)
MCH RBC QN AUTO: 31.3 PG (ref 26–34)
MCHC RBC AUTO-ENTMCNC: 32.2 G/DL (ref 30–36.5)
MCV RBC AUTO: 97 FL (ref 80–99)
NRBC # BLD: 0 K/UL (ref 0–0.01)
NRBC BLD-RTO: 0 PER 100 WBC
PHOSPHATE SERPL-MCNC: 2.1 MG/DL (ref 2.6–4.7)
PLATELET # BLD AUTO: 114 K/UL (ref 150–400)
PMV BLD AUTO: 12.1 FL (ref 8.9–12.9)
POTASSIUM SERPL-SCNC: 3.1 MMOL/L (ref 3.5–5.1)
RBC # BLD AUTO: 4 M/UL (ref 4.1–5.7)
SERVICE CMNT-IMP: ABNORMAL
SERVICE CMNT-IMP: ABNORMAL
SERVICE CMNT-IMP: NORMAL
SERVICE CMNT-IMP: NORMAL
SODIUM SERPL-SCNC: 138 MMOL/L (ref 136–145)
WBC # BLD AUTO: 8 K/UL (ref 4.1–11.1)

## 2024-08-12 PROCEDURE — 97535 SELF CARE MNGMENT TRAINING: CPT | Performed by: OCCUPATIONAL THERAPIST

## 2024-08-12 PROCEDURE — 6370000000 HC RX 637 (ALT 250 FOR IP): Performed by: NURSE PRACTITIONER

## 2024-08-12 PROCEDURE — 6370000000 HC RX 637 (ALT 250 FOR IP): Performed by: INTERNAL MEDICINE

## 2024-08-12 PROCEDURE — 2700000000 HC OXYGEN THERAPY PER DAY

## 2024-08-12 PROCEDURE — 97162 PT EVAL MOD COMPLEX 30 MIN: CPT

## 2024-08-12 PROCEDURE — 83735 ASSAY OF MAGNESIUM: CPT

## 2024-08-12 PROCEDURE — 84100 ASSAY OF PHOSPHORUS: CPT

## 2024-08-12 PROCEDURE — 80048 BASIC METABOLIC PNL TOTAL CA: CPT

## 2024-08-12 PROCEDURE — 85027 COMPLETE CBC AUTOMATED: CPT

## 2024-08-12 PROCEDURE — 97165 OT EVAL LOW COMPLEX 30 MIN: CPT | Performed by: OCCUPATIONAL THERAPIST

## 2024-08-12 PROCEDURE — 2060000000 HC ICU INTERMEDIATE R&B

## 2024-08-12 PROCEDURE — 97116 GAIT TRAINING THERAPY: CPT

## 2024-08-12 PROCEDURE — 2580000003 HC RX 258: Performed by: NURSE PRACTITIONER

## 2024-08-12 PROCEDURE — 36415 COLL VENOUS BLD VENIPUNCTURE: CPT

## 2024-08-12 PROCEDURE — 6360000002 HC RX W HCPCS: Performed by: NURSE PRACTITIONER

## 2024-08-12 PROCEDURE — 82962 GLUCOSE BLOOD TEST: CPT

## 2024-08-12 RX ORDER — FUROSEMIDE 40 MG/1
40 TABLET ORAL 2 TIMES DAILY
Status: DISCONTINUED | OUTPATIENT
Start: 2024-08-12 | End: 2024-08-15 | Stop reason: HOSPADM

## 2024-08-12 RX ORDER — DEXTROSE MONOHYDRATE 100 MG/ML
INJECTION, SOLUTION INTRAVENOUS CONTINUOUS PRN
Status: DISCONTINUED | OUTPATIENT
Start: 2024-08-12 | End: 2024-08-15 | Stop reason: HOSPADM

## 2024-08-12 RX ORDER — LANOLIN ALCOHOL/MO/W.PET/CERES
3 CREAM (GRAM) TOPICAL NIGHTLY PRN
Status: DISCONTINUED | OUTPATIENT
Start: 2024-08-12 | End: 2024-08-14

## 2024-08-12 RX ORDER — INSULIN LISPRO 100 [IU]/ML
0-4 INJECTION, SOLUTION INTRAVENOUS; SUBCUTANEOUS NIGHTLY
Status: DISCONTINUED | OUTPATIENT
Start: 2024-08-12 | End: 2024-08-15 | Stop reason: HOSPADM

## 2024-08-12 RX ORDER — GLUCAGON 1 MG/ML
1 KIT INJECTION PRN
Status: DISCONTINUED | OUTPATIENT
Start: 2024-08-12 | End: 2024-08-15 | Stop reason: HOSPADM

## 2024-08-12 RX ORDER — POTASSIUM CHLORIDE 20 MEQ/1
40 TABLET, EXTENDED RELEASE ORAL 2 TIMES DAILY
Status: COMPLETED | OUTPATIENT
Start: 2024-08-12 | End: 2024-08-12

## 2024-08-12 RX ORDER — INSULIN LISPRO 100 [IU]/ML
0-4 INJECTION, SOLUTION INTRAVENOUS; SUBCUTANEOUS
Status: DISCONTINUED | OUTPATIENT
Start: 2024-08-13 | End: 2024-08-15 | Stop reason: HOSPADM

## 2024-08-12 RX ADMIN — ENOXAPARIN SODIUM 40 MG: 100 INJECTION SUBCUTANEOUS at 08:47

## 2024-08-12 RX ADMIN — Medication: at 08:47

## 2024-08-12 RX ADMIN — SODIUM CHLORIDE, PRESERVATIVE FREE 10 ML: 5 INJECTION INTRAVENOUS at 22:05

## 2024-08-12 RX ADMIN — ASPIRIN 81 MG: 81 TABLET, COATED ORAL at 08:46

## 2024-08-12 RX ADMIN — PANTOPRAZOLE SODIUM 40 MG: 40 TABLET, DELAYED RELEASE ORAL at 05:41

## 2024-08-12 RX ADMIN — POTASSIUM CHLORIDE 40 MEQ: 1500 TABLET, EXTENDED RELEASE ORAL at 08:47

## 2024-08-12 RX ADMIN — Medication 1 AMPULE: at 21:55

## 2024-08-12 RX ADMIN — MELATONIN 3 MG: at 23:17

## 2024-08-12 RX ADMIN — FUROSEMIDE 40 MG: 40 TABLET ORAL at 08:46

## 2024-08-12 RX ADMIN — POTASSIUM CHLORIDE 40 MEQ: 1500 TABLET, EXTENDED RELEASE ORAL at 21:57

## 2024-08-12 RX ADMIN — Medication 1 AMPULE: at 08:46

## 2024-08-12 RX ADMIN — DIBASIC SODIUM PHOSPHATE, MONOBASIC POTASSIUM PHOSPHATE AND MONOBASIC SODIUM PHOSPHATE 2 TABLET: 852; 155; 130 TABLET ORAL at 13:54

## 2024-08-12 RX ADMIN — DILTIAZEM HYDROCHLORIDE 60 MG: 60 CAPSULE, EXTENDED RELEASE ORAL at 21:56

## 2024-08-12 RX ADMIN — Medication: at 21:55

## 2024-08-12 RX ADMIN — SODIUM CHLORIDE, PRESERVATIVE FREE 10 ML: 5 INJECTION INTRAVENOUS at 08:47

## 2024-08-12 RX ADMIN — FUROSEMIDE 40 MG: 40 TABLET ORAL at 18:19

## 2024-08-12 RX ADMIN — FINASTERIDE 5 MG: 5 TABLET, FILM COATED ORAL at 08:46

## 2024-08-12 RX ADMIN — DILTIAZEM HYDROCHLORIDE 60 MG: 60 CAPSULE, EXTENDED RELEASE ORAL at 08:46

## 2024-08-12 RX ADMIN — TAMSULOSIN HYDROCHLORIDE 0.4 MG: 0.4 CAPSULE ORAL at 08:46

## 2024-08-12 ASSESSMENT — PAIN SCALES - GENERAL
PAINLEVEL_OUTOF10: 0
PAINLEVEL_OUTOF10: 0

## 2024-08-12 NOTE — PROGRESS NOTES
Eval complete and note to follow.  Recommend resumption of OP OT at discharge.  Unable to wean pt from O2 this session.  O2 sat on room air briefly was 86% seated.  O2 sat on 2L NC with activity was 93%.

## 2024-08-12 NOTE — PROGRESS NOTES
Critical care interdisciplinary rounds today.  Following members present: Case Management, , Clinical Lead, Diabetes Team, Nursing, Nutrition, Pharmacy, and Physician. Family invited to participate.  Plan of care discussed.  See clinical pathway for plan of care and interventions and desired outcomes.    Plan to transfer patient today per Dr. Diaz.

## 2024-08-12 NOTE — PROGRESS NOTES
ICU Progress Note        Subjective:    - sitting comfortably on the bed.    - sitting comfortably on the bed. Off BiPAP, now on 4 L/min supplemental oxygen saturating 94%.      Vital Signs:    /64   Pulse 75   Temp 98.7 °F (37.1 °C) (Axillary)   Resp 18   Ht 1.676 m (5' 6\")   Wt 74.8 kg (165 lb)   SpO2 92%   BMI 26.63 kg/m²             Temp (24hrs), Av °F (36.7 °C), Min:97 °F (36.1 °C), Max:98.7 °F (37.1 °C)       Intake/Output:   Last shift:      No intake/output data recorded.  Last 3 shifts: 08/10 1901 -  0700  In: -   Out: 3200 [Urine:3200]    Intake/Output Summary (Last 24 hours) at 2024 0825  Last data filed at 2024 0537  Gross per 24 hour   Intake --   Output 1850 ml   Net -1850 ml     Physical Exam:    General: Not in acute distress  HEENT:  Anicteric sclerae; pink palpebral conjunctivae; mucosa moist  Resp:  Bilateral air entry +, no crackles or wheeze  CV:  S1, S2 present  GI:  Abdomen soft, non-tender; (+) active bowel sounds  Extremities:  +2 pulses on all extremities; no edema/ cyanosis/ clubbing noted  Skin:  Warm; no rashes/ lesions noted  Neurologic:  Non-focal    DATA:     Current Facility-Administered Medications   Medication Dose Route Frequency    balsum peru-castor oil (VENELEX) ointment   Topical BID    alcohol 62% (NOZIN) nasal  1 ampule  1 ampule Nasal Q12H    furosemide (LASIX) injection 40 mg  40 mg IntraVENous TID    sodium chloride flush 0.9 % injection 5-40 mL  5-40 mL IntraVENous 2 times per day    sodium chloride flush 0.9 % injection 5-40 mL  5-40 mL IntraVENous PRN    0.9 % sodium chloride infusion   IntraVENous PRN    enoxaparin (LOVENOX) injection 40 mg  40 mg SubCUTAneous Daily    ondansetron (ZOFRAN-ODT) disintegrating tablet 4 mg  4 mg Oral Q8H PRN    Or    ondansetron (ZOFRAN) injection 4 mg  4 mg IntraVENous Q6H PRN    polyethylene glycol (GLYCOLAX) packet 17 g  17 g Oral Daily PRN    acetaminophen (TYLENOL) tablet 650 mg

## 2024-08-12 NOTE — PROGRESS NOTES
End of Shift Note    Bedside shift change report given to Augusto Zee RN (oncoming nurse) by Nikki Zepeda RN (offgoing nurse).  Report included the following information SBAR, ED Summary, Intake/Output, MAR, Recent Results, and Cardiac Rhythm V paced    Shift worked:  [id number]     Shift summary and any significant changes:     No significant changes     Concerns for physician to address:  none     Zone phone for oncoming shift:   [phone]

## 2024-08-12 NOTE — PROGRESS NOTES
Report called to Nikki ordaz, all questions answered and pt transported on [employer] by this rn. 2nd skin done by this Rn and Nikki ordaz. Blanchable bottom, and bilateral flaky boggy heels as noted in flowsheets

## 2024-08-12 NOTE — CARE COORDINATION
Care Management Initial Assessment       RUR: 17% mod  Readmission? No  1st IM letter given? Yes - 8/10/2024  1st  letter given: No    Patient sleeping soundly at this time - spoke with Og Mann son via T/C - verified demogrphics on file- had a previous hosptalization from 6/27/2024 to 7/11/2024 and discharged to [facility] but patient has been back at his apartment for about 2 weeks now - uses cane or RW for ambulation, shower chair, sock aide and transport chair as needed.   Preferred pharmacy is [facility]- per PT/OT recommending patient return to OP PT/OT - will need a script from attending upon discharge- son agreeable with this plan.  Son to transport upon discharge. ENA CHOW    Advance Care Planning     General Advance Care Planning (ACP) Conversation    Date of Conversation: 8/12/2024  Conducted with: Patient with Decision Making Capacity and Healthcare Decision Maker  Other persons present: None    Healthcare Decision Maker:   Primary Decision Maker: Og Aleman Jr - Child - [phone]    Secondary Decision Maker: Katie Holden - Child - [phone]     Today we documented Decision Maker(s) consistent with ACP documents on file.  Content/Action Overview:  Has ACP document(s) on file - reflects the patient's care preferences  Length of Voluntary ACP Conversation in minutes:  <16 minutes (Non-Billable)    ENA CHOW

## 2024-08-12 NOTE — PROGRESS NOTES
Nursing contacted Nocturnist/cross cover provider via non-urgent messaging system [employer] and notified patient insulin ssi q6h w/o accuchecks. No other concerns reported. No acute distress reported. No other information provided by nurse. VSS. Appears pt is on a diet now. Ordered accuchecks achs and 0200, avoid long acting to avoid hypoglycemia for now, achs ssi also ordered. Will defer further evaluation/management to the day shift primary attending care team. Patient denies any further complaints or concerns. Nursing to notify Hospitalist for further/continued concerns. Will remain available overnight for further concerns if nursing/patient needs. Please note, there are RRT systems in this hospital in place that if nursing has acute or critical patient condition change or concern, this is to help facilitate and notify that patient needs immediate bedside evaluation by a provider.     Update  Nurse reported pt is asking for something to sleep. No other concerns reported. NAD reported. Vss. Melatonin 3mg hs prn ordered.    Non-billable note.

## 2024-08-12 NOTE — PROGRESS NOTES
Pharmacy Medication Reconciliation     The patient was interviewed regarding current PTA medication list, use and drug allergies;  patient present in room and obtained permission from patient to discuss drug regimen with visitor(s) present. The patient was questioned regarding use of any other inhalers, topical products, over the counter medications, herbal medications, vitamin products or ophthalmic/nasal/otic medication use.     Allergy Update: Patient has no known allergies.    Recommendations/Findings:   The following amendments were made to the patient's active medication list on file at [facility]:   1) Additions: none    2) Deletions: gentamcin, omeprazole     3) Changes: methimazole 5 mg to 2.5 mg       Pertinent Findings: patient's son manages his medication at home     Clarified PTA med list with patient's son over the phone. PTA medication list was corrected to the following:     Prior to Admission Medications   Prescriptions Last Dose Informant   ammonium lactate (LAC-HYDRIN) 12 % lotion     Sig: Apply topically as needed.   aspirin 81 MG EC tablet  Self, Transfer Papers/EMS   Sig: Take 1 tablet by mouth daily   atorvastatin (LIPITOR) 20 MG tablet  Self, Transfer Papers/EMS   Sig: Take 1 tablet by mouth at bedtime   dilTIAZem (CARDIZEM 12 HR) 60 MG extended release capsule     Sig: Take 1 capsule by mouth 2 times daily   finasteride (PROSCAR) 5 MG tablet  Self, Transfer Papers/EMS   Sig: Take 1 tablet by mouth daily   furosemide (LASIX) 20 MG tablet  Self, Transfer Papers/EMS   Sig: Take 1 tablet by mouth daily   lisinopril (PRINIVIL;ZESTRIL) 20 MG tablet  Self, Transfer Papers/EMS   Sig: Take 0.5 tablets by mouth daily   melatonin 3 MG TABS tablet     Sig: Take 1 tablet by mouth nightly   methIMAzole (TAPAZOLE) 2.5 MG tablet  Self, Transfer Papers/EMS   Sig: Take 1 tablet by mouth once a week Sunday   tamsulosin (FLOMAX) 0.4 MG capsule  Self, Transfer Papers/EMS   Sig: Take 1 capsule by mouth daily

## 2024-08-12 NOTE — PROGRESS NOTES
Attended Interdisciplinary rounds in CCU; patient care was discussed.    ABRIL Kovacs, BCC, Staff [facility] Paging Service  [phone])

## 2024-08-13 ENCOUNTER — APPOINTMENT (OUTPATIENT)
Facility: HOSPITAL | Age: 89
DRG: 291 | End: 2024-08-13
Payer: MEDICARE

## 2024-08-13 LAB
ANION GAP SERPL CALC-SCNC: 5 MMOL/L (ref 5–15)
BUN SERPL-MCNC: 20 MG/DL (ref 6–20)
BUN/CREAT SERPL: 24 (ref 12–20)
CALCIUM SERPL-MCNC: 8.7 MG/DL (ref 8.5–10.1)
CHLORIDE SERPL-SCNC: 107 MMOL/L (ref 97–108)
CO2 SERPL-SCNC: 28 MMOL/L (ref 21–32)
CREAT SERPL-MCNC: 0.82 MG/DL (ref 0.7–1.3)
ERYTHROCYTE [DISTWIDTH] IN BLOOD BY AUTOMATED COUNT: 13.3 % (ref 11.5–14.5)
GLUCOSE BLD STRIP.AUTO-MCNC: 101 MG/DL (ref 65–117)
GLUCOSE BLD STRIP.AUTO-MCNC: 111 MG/DL (ref 65–117)
GLUCOSE BLD STRIP.AUTO-MCNC: 120 MG/DL (ref 65–117)
GLUCOSE BLD STRIP.AUTO-MCNC: 95 MG/DL (ref 65–117)
GLUCOSE SERPL-MCNC: 113 MG/DL (ref 65–100)
HCT VFR BLD AUTO: 42.4 % (ref 36.6–50.3)
HGB BLD-MCNC: 13.8 G/DL (ref 12.1–17)
MAGNESIUM SERPL-MCNC: 2.1 MG/DL (ref 1.6–2.4)
MCH RBC QN AUTO: 31.4 PG (ref 26–34)
MCHC RBC AUTO-ENTMCNC: 32.5 G/DL (ref 30–36.5)
MCV RBC AUTO: 96.6 FL (ref 80–99)
NRBC # BLD: 0 K/UL (ref 0–0.01)
NRBC BLD-RTO: 0 PER 100 WBC
PHOSPHATE SERPL-MCNC: 2.4 MG/DL (ref 2.6–4.7)
PLATELET # BLD AUTO: 114 K/UL (ref 150–400)
PMV BLD AUTO: 12 FL (ref 8.9–12.9)
POTASSIUM SERPL-SCNC: 3.4 MMOL/L (ref 3.5–5.1)
RBC # BLD AUTO: 4.39 M/UL (ref 4.1–5.7)
SERVICE CMNT-IMP: ABNORMAL
SERVICE CMNT-IMP: NORMAL
SODIUM SERPL-SCNC: 140 MMOL/L (ref 136–145)
WBC # BLD AUTO: 8 K/UL (ref 4.1–11.1)

## 2024-08-13 PROCEDURE — 2580000003 HC RX 258: Performed by: NURSE PRACTITIONER

## 2024-08-13 PROCEDURE — 2580000003 HC RX 258: Performed by: INTERNAL MEDICINE

## 2024-08-13 PROCEDURE — 36415 COLL VENOUS BLD VENIPUNCTURE: CPT

## 2024-08-13 PROCEDURE — 82962 GLUCOSE BLOOD TEST: CPT

## 2024-08-13 PROCEDURE — 6370000000 HC RX 637 (ALT 250 FOR IP): Performed by: NURSE PRACTITIONER

## 2024-08-13 PROCEDURE — 83735 ASSAY OF MAGNESIUM: CPT

## 2024-08-13 PROCEDURE — 71045 X-RAY EXAM CHEST 1 VIEW: CPT

## 2024-08-13 PROCEDURE — 84100 ASSAY OF PHOSPHORUS: CPT

## 2024-08-13 PROCEDURE — 6370000000 HC RX 637 (ALT 250 FOR IP): Performed by: INTERNAL MEDICINE

## 2024-08-13 PROCEDURE — 97116 GAIT TRAINING THERAPY: CPT

## 2024-08-13 PROCEDURE — 2500000003 HC RX 250 WO HCPCS: Performed by: INTERNAL MEDICINE

## 2024-08-13 PROCEDURE — 2060000000 HC ICU INTERMEDIATE R&B

## 2024-08-13 PROCEDURE — 85027 COMPLETE CBC AUTOMATED: CPT

## 2024-08-13 PROCEDURE — 80048 BASIC METABOLIC PNL TOTAL CA: CPT

## 2024-08-13 PROCEDURE — 6360000002 HC RX W HCPCS: Performed by: NURSE PRACTITIONER

## 2024-08-13 PROCEDURE — 2700000000 HC OXYGEN THERAPY PER DAY

## 2024-08-13 RX ORDER — POTASSIUM CHLORIDE 1.5 G/1.58G
60 POWDER, FOR SOLUTION ORAL ONCE
Status: DISCONTINUED | OUTPATIENT
Start: 2024-08-13 | End: 2024-08-13 | Stop reason: SDUPTHER

## 2024-08-13 RX ADMIN — POTASSIUM BICARBONATE 60 MEQ: 782 TABLET, EFFERVESCENT ORAL at 10:32

## 2024-08-13 RX ADMIN — MELATONIN 3 MG: at 22:20

## 2024-08-13 RX ADMIN — SODIUM CHLORIDE, PRESERVATIVE FREE 10 ML: 5 INJECTION INTRAVENOUS at 22:24

## 2024-08-13 RX ADMIN — Medication 1 AMPULE: at 08:25

## 2024-08-13 RX ADMIN — PANTOPRAZOLE SODIUM 40 MG: 40 TABLET, DELAYED RELEASE ORAL at 06:45

## 2024-08-13 RX ADMIN — SODIUM PHOSPHATE, MONOBASIC, MONOHYDRATE AND SODIUM PHOSPHATE, DIBASIC, ANHYDROUS 15 MMOL: 276; 142 INJECTION, SOLUTION INTRAVENOUS at 11:03

## 2024-08-13 RX ADMIN — FINASTERIDE 5 MG: 5 TABLET, FILM COATED ORAL at 08:24

## 2024-08-13 RX ADMIN — DILTIAZEM HYDROCHLORIDE 60 MG: 60 CAPSULE, EXTENDED RELEASE ORAL at 08:24

## 2024-08-13 RX ADMIN — FUROSEMIDE 40 MG: 40 TABLET ORAL at 08:24

## 2024-08-13 RX ADMIN — Medication: at 22:20

## 2024-08-13 RX ADMIN — Medication: at 08:25

## 2024-08-13 RX ADMIN — TAMSULOSIN HYDROCHLORIDE 0.4 MG: 0.4 CAPSULE ORAL at 08:24

## 2024-08-13 RX ADMIN — ENOXAPARIN SODIUM 40 MG: 100 INJECTION SUBCUTANEOUS at 08:24

## 2024-08-13 RX ADMIN — SODIUM CHLORIDE, PRESERVATIVE FREE 10 ML: 5 INJECTION INTRAVENOUS at 08:24

## 2024-08-13 RX ADMIN — Medication 1 AMPULE: at 22:24

## 2024-08-13 RX ADMIN — DILTIAZEM HYDROCHLORIDE 60 MG: 60 CAPSULE, EXTENDED RELEASE ORAL at 22:20

## 2024-08-13 RX ADMIN — FUROSEMIDE 40 MG: 40 TABLET ORAL at 17:25

## 2024-08-13 RX ADMIN — ASPIRIN 81 MG: 81 TABLET, COATED ORAL at 08:24

## 2024-08-13 ASSESSMENT — PAIN SCALES - GENERAL
PAINLEVEL_OUTOF10: 0
PAINLEVEL_OUTOF10: 1
PAINLEVEL_OUTOF10: 0

## 2024-08-13 NOTE — PROGRESS NOTES
Bedside and Verbal shift change report given to Yayo VERDUGO, David RN (oncoming nurse) by Nikki  (offgoing nurse). Report included the following information Nurse Handoff Report, ED Encounter Summary, MAR, Recent Results, and Cardiac Rhythm NSR .      End of Shift Note    Bedside shift change report given to *** (oncoming nurse) by Yayo Dozier RN (offgoing nurse).  Report included the following information SBAR    Shift worked:  7p-7a     Shift summary and any significant changes:     N/A     Concerns for physician to address:  N/A     Zone phone for oncoming shift:   N/A       Activity:     Number times ambulated in hallways past shift: 0  Number of times OOB to chair past shift: 0    Cardiac:   Cardiac Monitoring: Yes           Access:  Current line(s): PIV     Genitourinary:   Urinary status: voiding and external catheter    Respiratory:      Chronic home O2 use?: NO  Incentive spirometer at bedside: NO       GI:     Current diet:  ADULT DIET; Regular; 4 carb choices (60 gm/meal)  DIET ONE TIME MESSAGE;  Passing flatus: YES  Tolerating current diet: YES       Pain Management:   Patient states pain is manageable on current regimen: YES    Skin:     Interventions: {shannon interventions:34432}    Patient Safety:  Fall Score:    Interventions: {fall interventions:21136}       Length of Stay:  Expected LOS: 5  Actual LOS: 3      Yayo Dozier RN

## 2024-08-13 NOTE — PROGRESS NOTES
Patient approached for OT services  however he very politely declined as he has just walked a good distance with PT. Discussed return home- patient does not feel he will need additional assistance with ADL. He was stable on room air today. He expects to resume OT services that was focused on improving  strength and fine motor control.   Niyah Avila, OTR/L

## 2024-08-13 NOTE — PROGRESS NOTES
Hospitalist Progress Note    NAME: Og Aleman   :  1931   MRN:  [id number]            Subjective:     Chief Complaint / Reason for Physician Visit Dyspnea  Patient seen and evaluated at bedside, overnight events reviewed, of note patient still complaining of shortness of breath however has improved.  Discussed with RN events overnight.     Review of Systems:  Symptom Y/N Comments  Symptom Y/N Comments   Fever/Chills N   Chest Pain N    Poor Appetite Y   Edema N    Cough Y   Abdominal Pain N    Sputum Y   Joint Pain N    SOB/GARCIA Y   Pruritis/Rash N    Nausea/vomit N   Tolerating PT/OT NA    Diarrhea N   Tolerating Diet Y    Constipation N   Other       Could NOT obtain due to:      Objective:     VITALS:   Last 24hrs VS reviewed since prior progress note. Most recent are:  [unfilled]    Intake/Output Summary (Last 24 hours) at 2024 1027  Last data filed at 2024 2156  Gross per 24 hour   Intake 90 ml   Output 500 ml   Net -410 ml        PHYSICAL EXAM:  General: Patient appears comfortable    EENT:  EOMI. Anicteric sclerae. MMM  Resp:  Decreased air entry bilaterally in bilateral lower lung zones  CV:  Regular  rhythm, s1/s2 no m/r/g No edema  GI:  Soft, Non distended, Non tender.  +Bowel sounds  Neurologic:  Alert and oriented X 3, normal speech,   Psych:   Good insight. Not anxious nor agitated  Skin:  No rashes.  No jaundice    Procedures: see electronic medical records for all procedures/Xrays and details which were not copied into this note but were reviewed prior to creation of Plan.      LABS:  I reviewed today's most current labs and imaging studies.  Pertinent labs include:  Recent Labs     2422 24  0506   WBC 8.6 8.0 8.0   HGB 13.0 12.5 13.8   HCT 41.4 38.8 42.4   * 114* 114*     Recent Labs     08/10/24  2055 08/11/24  0312 08/12/24  0522 24  0506    141 138 140   K 4.6 3.8 3.1* 3.4*   * 108 107 107   CO2 22 26 28 28   BUN 18 18 21* 20

## 2024-08-13 NOTE — CARE COORDINATION
Transition of Care Plan:    RUR: 16% (moderate RUR)  Prior Level of Functioning: Assistance with housework, shopping and cooking  Disposition: Return to [facility] with OP PT/OT script  If SNF or IPR: Date FOC offered: N/A  Date FOC received: N/A  Accepting facility: N/A  Date authorization started with reference number: N/A  Date authorization received and expires: N/A  Follow up appointments: PCP/specialists if needed.  DME needed: None.  Patient has a cane, RW, wheelchair and transport chair at home.    Transportation at discharge: Son anticipate to transport  IM/IMM Medicare/ letter given: 2nd IM needed prior to discharge.  Is patient a  and connected with [facility]? No.   If yes, was [place] transfer form completed and [facility] notified? N/A  Caregiver Contact: Og Aleman Jr. - son - [phone]   Discharge Caregiver contacted prior to discharge? Patient to contact.  Care Conference needed? No.  Barriers to discharge: cards to see, wean 1L/PAP      DAREN CristinaN, RN    Care Management  [phone]

## 2024-08-13 NOTE — PROGRESS NOTES
0700: Bedside and Verbal shift change report given to KARTIK Lucero (oncoming nurse) by David/KARTIK Zee (offgoing nurse). Report included the following information Nurse Handoff Report, Index, Intake/Output, MAR, and Recent Results.     1802: Pt ambulated down hallway with walker about 200 feet, pt assisted back to recliner. VSS.    1900: Bedside and Verbal shift change report given to David/KARTIK Zee (oncoming nurse) by KARTIK Lucero (offgoing nurse). Report included the following information Nurse Handoff Report, Index, Intake/Output, MAR, and Recent Results.

## 2024-08-14 LAB
ALBUMIN SERPL-MCNC: 2.8 G/DL (ref 3.5–5)
ALBUMIN/GLOB SERPL: 0.8 (ref 1.1–2.2)
ALP SERPL-CCNC: 70 U/L (ref 45–117)
ALT SERPL-CCNC: 22 U/L (ref 12–78)
ANION GAP SERPL CALC-SCNC: 9 MMOL/L (ref 5–15)
AST SERPL-CCNC: 11 U/L (ref 15–37)
BASOPHILS # BLD: 0 K/UL (ref 0–0.1)
BASOPHILS NFR BLD: 1 % (ref 0–1)
BILIRUB SERPL-MCNC: 1.4 MG/DL (ref 0.2–1)
BUN SERPL-MCNC: 19 MG/DL (ref 6–20)
BUN/CREAT SERPL: 24 (ref 12–20)
CALCIUM SERPL-MCNC: 8.3 MG/DL (ref 8.5–10.1)
CHLORIDE SERPL-SCNC: 107 MMOL/L (ref 97–108)
CO2 SERPL-SCNC: 26 MMOL/L (ref 21–32)
CREAT SERPL-MCNC: 0.8 MG/DL (ref 0.7–1.3)
DIFFERENTIAL METHOD BLD: ABNORMAL
EOSINOPHIL # BLD: 0.3 K/UL (ref 0–0.4)
EOSINOPHIL NFR BLD: 3 % (ref 0–7)
ERYTHROCYTE [DISTWIDTH] IN BLOOD BY AUTOMATED COUNT: 13.3 % (ref 11.5–14.5)
GLOBULIN SER CALC-MCNC: 3.5 G/DL (ref 2–4)
GLUCOSE BLD STRIP.AUTO-MCNC: 105 MG/DL (ref 65–117)
GLUCOSE BLD STRIP.AUTO-MCNC: 109 MG/DL (ref 65–117)
GLUCOSE BLD STRIP.AUTO-MCNC: 110 MG/DL (ref 65–117)
GLUCOSE BLD STRIP.AUTO-MCNC: 116 MG/DL (ref 65–117)
GLUCOSE BLD STRIP.AUTO-MCNC: 116 MG/DL (ref 65–117)
GLUCOSE SERPL-MCNC: 108 MG/DL (ref 65–100)
HCT VFR BLD AUTO: 37.6 % (ref 36.6–50.3)
HGB BLD-MCNC: 12.3 G/DL (ref 12.1–17)
IMM GRANULOCYTES # BLD AUTO: 0 K/UL (ref 0–0.04)
IMM GRANULOCYTES NFR BLD AUTO: 0 % (ref 0–0.5)
LYMPHOCYTES # BLD: 2.5 K/UL (ref 0.8–3.5)
LYMPHOCYTES NFR BLD: 34 % (ref 12–49)
MAGNESIUM SERPL-MCNC: 2 MG/DL (ref 1.6–2.4)
MCH RBC QN AUTO: 31.9 PG (ref 26–34)
MCHC RBC AUTO-ENTMCNC: 32.7 G/DL (ref 30–36.5)
MCV RBC AUTO: 97.4 FL (ref 80–99)
MONOCYTES # BLD: 0.6 K/UL (ref 0–1)
MONOCYTES NFR BLD: 8 % (ref 5–13)
NEUTS SEG # BLD: 3.9 K/UL (ref 1.8–8)
NEUTS SEG NFR BLD: 54 % (ref 32–75)
NRBC # BLD: 0 K/UL (ref 0–0.01)
NRBC BLD-RTO: 0 PER 100 WBC
PLATELET # BLD AUTO: 120 K/UL (ref 150–400)
PMV BLD AUTO: 12 FL (ref 8.9–12.9)
POTASSIUM SERPL-SCNC: 3.2 MMOL/L (ref 3.5–5.1)
PROT SERPL-MCNC: 6.3 G/DL (ref 6.4–8.2)
RBC # BLD AUTO: 3.86 M/UL (ref 4.1–5.7)
SERVICE CMNT-IMP: NORMAL
SODIUM SERPL-SCNC: 142 MMOL/L (ref 136–145)
WBC # BLD AUTO: 7.3 K/UL (ref 4.1–11.1)

## 2024-08-14 PROCEDURE — 83735 ASSAY OF MAGNESIUM: CPT

## 2024-08-14 PROCEDURE — 82962 GLUCOSE BLOOD TEST: CPT

## 2024-08-14 PROCEDURE — 6370000000 HC RX 637 (ALT 250 FOR IP): Performed by: INTERNAL MEDICINE

## 2024-08-14 PROCEDURE — 6360000002 HC RX W HCPCS: Performed by: NURSE PRACTITIONER

## 2024-08-14 PROCEDURE — 97530 THERAPEUTIC ACTIVITIES: CPT

## 2024-08-14 PROCEDURE — 2580000003 HC RX 258: Performed by: NURSE PRACTITIONER

## 2024-08-14 PROCEDURE — 80053 COMPREHEN METABOLIC PANEL: CPT

## 2024-08-14 PROCEDURE — 85025 COMPLETE CBC W/AUTO DIFF WBC: CPT

## 2024-08-14 PROCEDURE — 6370000000 HC RX 637 (ALT 250 FOR IP): Performed by: NURSE PRACTITIONER

## 2024-08-14 PROCEDURE — 36415 COLL VENOUS BLD VENIPUNCTURE: CPT

## 2024-08-14 PROCEDURE — 97535 SELF CARE MNGMENT TRAINING: CPT

## 2024-08-14 PROCEDURE — 2060000000 HC ICU INTERMEDIATE R&B

## 2024-08-14 RX ORDER — LANOLIN ALCOHOL/MO/W.PET/CERES
3 CREAM (GRAM) TOPICAL NIGHTLY
Status: DISCONTINUED | OUTPATIENT
Start: 2024-08-14 | End: 2024-08-15 | Stop reason: HOSPADM

## 2024-08-14 RX ADMIN — FINASTERIDE 5 MG: 5 TABLET, FILM COATED ORAL at 10:47

## 2024-08-14 RX ADMIN — Medication: at 10:48

## 2024-08-14 RX ADMIN — ASPIRIN 81 MG: 81 TABLET, COATED ORAL at 10:47

## 2024-08-14 RX ADMIN — Medication: at 20:59

## 2024-08-14 RX ADMIN — SODIUM CHLORIDE, PRESERVATIVE FREE 10 ML: 5 INJECTION INTRAVENOUS at 21:00

## 2024-08-14 RX ADMIN — FUROSEMIDE 40 MG: 40 TABLET ORAL at 17:17

## 2024-08-14 RX ADMIN — DILTIAZEM HYDROCHLORIDE 60 MG: 60 CAPSULE, EXTENDED RELEASE ORAL at 10:47

## 2024-08-14 RX ADMIN — SODIUM CHLORIDE, PRESERVATIVE FREE 10 ML: 5 INJECTION INTRAVENOUS at 10:49

## 2024-08-14 RX ADMIN — PANTOPRAZOLE SODIUM 40 MG: 40 TABLET, DELAYED RELEASE ORAL at 06:23

## 2024-08-14 RX ADMIN — TAMSULOSIN HYDROCHLORIDE 0.4 MG: 0.4 CAPSULE ORAL at 10:47

## 2024-08-14 RX ADMIN — POTASSIUM BICARBONATE 40 MEQ: 782 TABLET, EFFERVESCENT ORAL at 10:46

## 2024-08-14 RX ADMIN — POTASSIUM BICARBONATE 40 MEQ: 782 TABLET, EFFERVESCENT ORAL at 12:33

## 2024-08-14 RX ADMIN — FUROSEMIDE 40 MG: 40 TABLET ORAL at 10:47

## 2024-08-14 RX ADMIN — Medication 1 AMPULE: at 20:59

## 2024-08-14 RX ADMIN — Medication 1 AMPULE: at 10:00

## 2024-08-14 RX ADMIN — DILTIAZEM HYDROCHLORIDE 60 MG: 60 CAPSULE, EXTENDED RELEASE ORAL at 20:59

## 2024-08-14 RX ADMIN — MELATONIN 3 MG: at 20:59

## 2024-08-14 RX ADMIN — ENOXAPARIN SODIUM 40 MG: 100 INJECTION SUBCUTANEOUS at 10:47

## 2024-08-14 ASSESSMENT — PAIN SCALES - GENERAL
PAINLEVEL_OUTOF10: 0

## 2024-08-14 NOTE — PROGRESS NOTES
Hospitalist Progress Note    NAME:   Og Aleman   : 1931   MRN: [id number]     Date/Time: 2024 12:47 PM  Patient PCP: Herman Ortiz MD    Estimated discharge date: 8/15/24  Barriers: cardiology consult      Assessment / Plan:  Acute hypoxic respiratory failure  Acute on chronic diastolic heart failure  Continue po lasix  Off BIPAP and off supplemental oxygen  Waiting for cardiology consult    Chronic atrial fibrillation s/p watchman  Tachy/coleen syndrome  S/p AV node ablation and PPM on 24  Continue diltiazem and aspirin    Acute hypokalemia:  Will replete potassium    Hypophosphatemia:  Will send phosphorus level tomorrow.    Thrombocytopenia:  Stable    BPH:  Continue finasteride and flomax    GERD:  Continue PPI          Medical Decision Making:   I personally reviewed labs:  Cbc  BMP: low potassium- will replete  LFT- T.bili: 1.4    I personally reviewed imaging:  I personally reviewed EKG:  Toxic drug monitoring:   Discussed case with: patient, RN        Code Status: full  DVT Prophylaxis: lovenox  GI Prophylaxis:    Subjective:     Chief Complaint / Reason for Physician Visit  Patient reports feeling better      Objective:     VITALS:   Last 24hrs VS reviewed since prior progress note. Most recent are:  Patient Vitals for the past 24 hrs:   BP Temp Temp src Pulse Resp SpO2 Weight   24 1045 -- 97.5 °F (36.4 °C) Axillary -- -- -- --   24 0730 -- 97.9 °F (36.6 °C) Oral -- -- -- --   24 0600 -- -- -- -- -- -- 71.5 kg (157 lb 10.1 oz)   24 0200 138/69 97.6 °F (36.4 °C) Oral 82 18 93 % --   24 2220 130/63 98.3 °F (36.8 °C) Oral 79 18 93 % --   24 1945 116/64 97.2 °F (36.2 °C) Oral 77 18 93 % --   24 1730 120/73 -- -- 77 -- 97 % --   24 1725 120/73 -- -- -- -- -- --   24 1545 (!) 114/92 98.1 °F (36.7 °C) Oral 77 -- 97 % --   24 1400 -- -- -- -- -- 97 % --         Intake/Output Summary (Last 24 hours) at 2024 1247  Last data

## 2024-08-14 NOTE — PLAN OF CARE
Problem: Occupational Therapy - Adult  Goal: By Discharge: Performs self-care activities at highest level of function for planned discharge setting.  See evaluation for individualized goals.  Description: FUNCTIONAL STATUS PRIOR TO ADMISSION:  ambulating with rollator walker or SPC, independent with all ADLS, goes to the dining perry for meals, difficulty with FMC tasks and getting OP OT for this, getting OP PT, sits on shower chair to bathe, sleeps in a lift recliner chair but doesn't use lift feature   ,  ,  ,  ,  ,  ,  ,  ,  ,  ,       HOME SUPPORT: Patient lived alone in ILF at [facility].    Occupational Therapy Goals:  Initiated 8/12/2024  1.  Patient will perform grooming with Modified Gentry within 7 day(s).  2.  Patient will perform upper body dressing and lower body dressing with Modified Gentry within 7 day(s).  3.  Patient will perform toileting with Modified Gentry within 7 day(s).  4.  Patient will perform toilet transfers with Modified Gentry  within 7 day(s).  5.  Patient will perform one light IADL task with rollator walker with independence within 7 day(s).      Outcome: Progressing  OCCUPATIONAL THERAPY TREATMENT  Patient: Og Aleman (89 y.o. male)  Date: 8/14/2024  Primary Diagnosis: Acute on chronic clinical systolic heart failure (HCC) [I50.23]       Precautions: None                Chart, occupational therapy assessment, plan of care, and goals were reviewed.    ASSESSMENT  Patient continues to benefit from skilled OT services and is progressing towards goals. He was received seated in chair, agreeable to participate. Pt performed LB dressing ADL in tailor sitting with setup. Noted mild difficulty with reaching distal Les, educated pt on use of AE to assist as needed. Using RW pt ambulated in room and hallway with SBA, no LOB observed. Pt mildly GARCIA after ambulating, recovered quickly in sitting. Vitals monitored and stable throughout on RA (/63, 138/70; 
  Problem: Physical Therapy - Adult  Goal: By Discharge: Performs mobility at highest level of function for planned discharge setting.  See evaluation for individualized goals.  Description: FUNCTIONAL STATUS PRIOR TO ADMISSION: Patient was modified independent using a rollator for functional mobility.    HOME SUPPORT PRIOR TO ADMISSION: Pt lives at [facility]    Physical Therapy Goals  Initiated 8/12/2024  1.  Patient will move from supine to sit and sit to supine in bed with independence within 7 day(s).    2.  Patient will perform sit to stand with modified independence within 7 day(s).  3.  Patient will transfer from bed to chair and chair to bed with modified independence using the least restrictive device within 7 day(s).  4.  Patient will ambulate with modified independence for 400 feet with the least restrictive device within 7 day(s).       Outcome: Adequate for Discharge   PHYSICAL THERAPY TREATMENT/DISCHARGE    Patient: Og Aleman (89 y.o. male)  Date: 8/13/2024  Diagnosis: Acute on chronic clinical systolic heart failure (HCC) [I50.23] Acute on chronic clinical systolic heart failure (HCC)      Precautions: None                      ASSESSMENT:  Patient has been followed by skilled PT services and has progressed towards goals. Pt received sitting in bedside chair, agreeable to participate in therapy. Received on 1L O2, saturating 100%, able to wean to RA, saturating above 95% throughout mobility. Pt continues to demonstrate significant improvements with mobility today with good tolerance to increased gait distance. No LOB or unsteadiness noted during mobility with no requirements of cueing. Pt is performing at or close to baseline and does not require any acute needs at this time.     Recommend pt continue ambulating in the hallway with nursing throughout hospital stay.       PLAN:  Maximum therapeutic benefit has been met at current level of care and patient will be discharged from physical 
  Problem: Physical Therapy - Adult  Goal: By Discharge: Performs mobility at highest level of function for planned discharge setting.  See evaluation for individualized goals.  Description: FUNCTIONAL STATUS PRIOR TO ADMISSION: Patient was modified independent using a rollator for functional mobility.    HOME SUPPORT PRIOR TO ADMISSION: Pt lives at [facility]    Physical Therapy Goals  Initiated 8/12/2024  1.  Patient will move from supine to sit and sit to supine in bed with independence within 7 day(s).    2.  Patient will perform sit to stand with modified independence within 7 day(s).  3.  Patient will transfer from bed to chair and chair to bed with modified independence using the least restrictive device within 7 day(s).  4.  Patient will ambulate with modified independence for 400 feet with the least restrictive device within 7 day(s).       Outcome: Progressing      PHYSICAL THERAPY EVALUATION    Patient: Og Aleman (89 y.o. male)  Date: 8/12/2024  Primary Diagnosis: Acute on chronic clinical systolic heart failure (HCC) [I50.23]       Precautions: Restrictions/Precautions: None                      ASSESSMENT :   DEFICITS/IMPAIRMENTS:   The patient is limited by decreased strength, endurance, balance     Based on the impairments listed above pt is close to functional baseline. Pt was received in supine  on 2L and cleared by nursing to mobilize. Attempted to mobilize on RA but oxygen dropped to 86%/ pt placed on 2L for ambulation. Ambulated into the [place] with accelerated pace but no loss of balance. He reports ambulation is about his baseline function.      Patient will benefit from skilled intervention to address the above impairments.    Functional Outcome Measure:  The patient scored 17/24 on the [facility] outcome measure which is indicative of decreased functional mobility.           PLAN :  Recommendations and Planned Interventions:   bed mobility training, transfer training, gait training, 
  Problem: Respiratory - Adult  Goal: Achieves optimal ventilation and oxygenation  8/13/2024 1336 by Jazmine Robles RN  Outcome: Not Progressing  8/13/2024 0013 by Yayo Dozier RN  Outcome: Progressing     Problem: Discharge Planning  Goal: Discharge to home or other facility with appropriate resources  8/13/2024 1336 by Jazmine Robles RN  Outcome: Not Progressing  8/13/2024 0013 by Yayo Dozier RN  Outcome: Progressing     Problem: Skin/Tissue Integrity  Goal: Absence of new skin breakdown  Description: 1.  Monitor for areas of redness and/or skin breakdown  2.  Assess vascular access sites hourly  3.  Every 4-6 hours minimum:  Change oxygen saturation probe site  4.  Every 4-6 hours:  If on nasal continuous positive airway pressure, respiratory therapy assess nares and determine need for appliance change or resting period.  Outcome: Not Progressing     Problem: Safety - Adult  Goal: Free from fall injury  8/13/2024 1336 by Jazmine Robles RN  Outcome: Not Progressing  8/13/2024 0013 by Yayo Dozier RN  Outcome: Progressing     Problem: ABCDS Injury Assessment  Goal: Absence of physical injury  Outcome: Not Progressing     Problem: Pain  Goal: Verbalizes/displays adequate comfort level or baseline comfort level  Outcome: Not Progressing     Problem: Respiratory - Adult  Goal: Achieves optimal ventilation and oxygenation  8/13/2024 1336 by Jazmine Robles RN  Outcome: Not Progressing  8/13/2024 0013 by Yayo Dozier RN  Outcome: Progressing     Problem: Discharge Planning  Goal: Discharge to home or other facility with appropriate resources  8/13/2024 1336 by Jazmine Robles RN  Outcome: Not Progressing  8/13/2024 0013 by Yayo Dozier RN  Outcome: Progressing     Problem: Skin/Tissue Integrity  Goal: Absence of new skin breakdown  Description: 1.  Monitor for areas of redness and/or skin breakdown  2.  Assess vascular access sites hourly  3.  Every 4-6 hours minimum:  Change 
  Problem: Respiratory - Adult  Goal: Achieves optimal ventilation and oxygenation  8/14/2024 0016 by Yayo Dozier RN  Outcome: Progressing  8/13/2024 1336 by Jazmine Robles RN  Outcome: Not Progressing     Problem: Discharge Planning  Goal: Discharge to home or other facility with appropriate resources  8/14/2024 0016 by Yayo Dozier RN  Outcome: Progressing  8/13/2024 1336 by Jazmine Robles RN  Outcome: Not Progressing     Problem: Skin/Tissue Integrity  Goal: Absence of new skin breakdown  Description: 1.  Monitor for areas of redness and/or skin breakdown  2.  Assess vascular access sites hourly  3.  Every 4-6 hours minimum:  Change oxygen saturation probe site  4.  Every 4-6 hours:  If on nasal continuous positive airway pressure, respiratory therapy assess nares and determine need for appliance change or resting period.  8/14/2024 0016 by Yayo Dozier RN  Outcome: Progressing  8/13/2024 1336 by Jazmine Robles RN  Outcome: Not Progressing     Problem: Respiratory - Adult  Goal: Achieves optimal ventilation and oxygenation  8/14/2024 0016 by Yayo Dozier RN  Outcome: Progressing  8/13/2024 1336 by Jazmine Robles RN  Outcome: Not Progressing     Problem: Discharge Planning  Goal: Discharge to home or other facility with appropriate resources  8/14/2024 0016 by Yayo Dozier RN  Outcome: Progressing  8/13/2024 1336 by Jazmine Robles RN  Outcome: Not Progressing     Problem: Skin/Tissue Integrity  Goal: Absence of new skin breakdown  Description: 1.  Monitor for areas of redness and/or skin breakdown  2.  Assess vascular access sites hourly  3.  Every 4-6 hours minimum:  Change oxygen saturation probe site  4.  Every 4-6 hours:  If on nasal continuous positive airway pressure, respiratory therapy assess nares and determine need for appliance change or resting period.  8/14/2024 0016 by Yayo Dozier RN  Outcome: Progressing  8/13/2024 1336 by Jazmine Robles 
  Problem: Respiratory - Adult  Goal: Achieves optimal ventilation and oxygenation  Outcome: Progressing     Problem: Discharge Planning  Goal: Discharge to home or other facility with appropriate resources  Outcome: Progressing     Problem: Safety - Adult  Goal: Free from fall injury  Outcome: Progressing     
CGA and managed toileting needs with CGA.  Stood at sink to wash and dry hands with SBA.  With hand held assist back to bedside chair pt presented with CGA for balance overall.     Barthel Index:    Barthel Index   Feeding: Independent, Able to apply any necessary device. Feeds in reasonable time  Bathing: Cannot perform activity  Grooming: Washes face, booker hair, brushes teeth, shaves (manages plug if electric razor)  Dressing: Needs help, but does at least half of task within reasonable time  Bowel Control: No accidents. Able to use enema or suppository if needed  Bladder Control: No accidents. Able to care for collecting device, if used  Toilet Transfers: Needs help for balance, handling clothes or toilet paper  Chair/Bed Trannsfers: Minimum assistance or supervision required  Ambulation: With help for 50 yards  Stairs: Needs help or supervision  Total Barthel Index Score: 70            The Barthel ADL Index: Guidelines  1. The index should be used as a record of what a patient does, not as a record of what a patient could do.  2. The main aim is to establish degree of independence from any help, physical or verbal, however minor and for whatever reason.  3. The need for supervision renders the patient not independent.  4. A patient's performance should be established using the best available evidence. Asking the patient, friends/relatives and nurses are the usual sources, but direct observation and common sense are also important. However direct testing is not needed.  5. Usually the patient's performance over the preceding 24-48 hours is important, but occasionally longer periods will be relevant.  6. Middle categories imply that the patient supplies over 50 per cent of the effort.  7. Use of aids to be independent is allowed.    Score Interpretation (from Sinoff 1997)    Independent   60-79 Minimally independent   40-59 Partially dependent   20-39 Very dependent   <20 Totally dependent     Casey

## 2024-08-15 VITALS
BODY MASS INDEX: 25.1 KG/M2 | OXYGEN SATURATION: 97 % | RESPIRATION RATE: 18 BRPM | HEIGHT: 66 IN | HEART RATE: 75 BPM | WEIGHT: 156.2 LBS | DIASTOLIC BLOOD PRESSURE: 53 MMHG | SYSTOLIC BLOOD PRESSURE: 104 MMHG | TEMPERATURE: 97.9 F

## 2024-08-15 LAB
ALBUMIN SERPL-MCNC: 2.9 G/DL (ref 3.5–5)
ALBUMIN/GLOB SERPL: 0.8 (ref 1.1–2.2)
ALP SERPL-CCNC: 79 U/L (ref 45–117)
ALT SERPL-CCNC: 23 U/L (ref 12–78)
ANION GAP SERPL CALC-SCNC: 6 MMOL/L (ref 5–15)
AST SERPL-CCNC: 23 U/L (ref 15–37)
BASOPHILS # BLD: 0.1 K/UL (ref 0–0.1)
BASOPHILS NFR BLD: 1 % (ref 0–1)
BILIRUB DIRECT SERPL-MCNC: 0.2 MG/DL (ref 0–0.2)
BILIRUB SERPL-MCNC: 0.8 MG/DL (ref 0.2–1)
BUN SERPL-MCNC: 21 MG/DL (ref 6–20)
BUN/CREAT SERPL: 22 (ref 12–20)
CALCIUM SERPL-MCNC: 8.9 MG/DL (ref 8.5–10.1)
CHLORIDE SERPL-SCNC: 105 MMOL/L (ref 97–108)
CO2 SERPL-SCNC: 29 MMOL/L (ref 21–32)
CREAT SERPL-MCNC: 0.95 MG/DL (ref 0.7–1.3)
DIFFERENTIAL METHOD BLD: ABNORMAL
EKG ATRIAL RATE: 79 BPM
EKG DIAGNOSIS: NORMAL
EKG P AXIS: 86 DEGREES
EKG Q-T INTERVAL: 468 MS
EKG QRS DURATION: 162 MS
EKG QTC CALCULATION (BAZETT): 529 MS
EKG R AXIS: 100 DEGREES
EKG T AXIS: 135 DEGREES
EKG VENTRICULAR RATE: 77 BPM
EOSINOPHIL # BLD: 0.3 K/UL (ref 0–0.4)
EOSINOPHIL NFR BLD: 5 % (ref 0–7)
ERYTHROCYTE [DISTWIDTH] IN BLOOD BY AUTOMATED COUNT: 13.2 % (ref 11.5–14.5)
GLOBULIN SER CALC-MCNC: 3.6 G/DL (ref 2–4)
GLUCOSE BLD STRIP.AUTO-MCNC: 121 MG/DL (ref 65–117)
GLUCOSE BLD STRIP.AUTO-MCNC: 98 MG/DL (ref 65–117)
GLUCOSE SERPL-MCNC: 104 MG/DL (ref 65–100)
HCT VFR BLD AUTO: 38.7 % (ref 36.6–50.3)
HGB BLD-MCNC: 12.8 G/DL (ref 12.1–17)
IMM GRANULOCYTES # BLD AUTO: 0 K/UL (ref 0–0.04)
IMM GRANULOCYTES NFR BLD AUTO: 0 % (ref 0–0.5)
LYMPHOCYTES # BLD: 2.3 K/UL (ref 0.8–3.5)
LYMPHOCYTES NFR BLD: 39 % (ref 12–49)
MAGNESIUM SERPL-MCNC: 2.2 MG/DL (ref 1.6–2.4)
MCH RBC QN AUTO: 31.8 PG (ref 26–34)
MCHC RBC AUTO-ENTMCNC: 33.1 G/DL (ref 30–36.5)
MCV RBC AUTO: 96.3 FL (ref 80–99)
MONOCYTES # BLD: 0.5 K/UL (ref 0–1)
MONOCYTES NFR BLD: 8 % (ref 5–13)
NEUTS SEG # BLD: 2.9 K/UL (ref 1.8–8)
NEUTS SEG NFR BLD: 48 % (ref 32–75)
NRBC # BLD: 0 K/UL (ref 0–0.01)
NRBC BLD-RTO: 0 PER 100 WBC
PHOSPHATE SERPL-MCNC: 3.2 MG/DL (ref 2.6–4.7)
PLATELET # BLD AUTO: 133 K/UL (ref 150–400)
PMV BLD AUTO: 12 FL (ref 8.9–12.9)
POTASSIUM SERPL-SCNC: 3.7 MMOL/L (ref 3.5–5.1)
PROT SERPL-MCNC: 6.5 G/DL (ref 6.4–8.2)
RBC # BLD AUTO: 4.02 M/UL (ref 4.1–5.7)
SERVICE CMNT-IMP: ABNORMAL
SERVICE CMNT-IMP: NORMAL
SODIUM SERPL-SCNC: 140 MMOL/L (ref 136–145)
WBC # BLD AUTO: 5.9 K/UL (ref 4.1–11.1)

## 2024-08-15 PROCEDURE — 84100 ASSAY OF PHOSPHORUS: CPT

## 2024-08-15 PROCEDURE — 6370000000 HC RX 637 (ALT 250 FOR IP): Performed by: NURSE PRACTITIONER

## 2024-08-15 PROCEDURE — 6370000000 HC RX 637 (ALT 250 FOR IP): Performed by: INTERNAL MEDICINE

## 2024-08-15 PROCEDURE — 93005 ELECTROCARDIOGRAM TRACING: CPT | Performed by: NURSE PRACTITIONER

## 2024-08-15 PROCEDURE — 36415 COLL VENOUS BLD VENIPUNCTURE: CPT

## 2024-08-15 PROCEDURE — 80076 HEPATIC FUNCTION PANEL: CPT

## 2024-08-15 PROCEDURE — 85025 COMPLETE CBC W/AUTO DIFF WBC: CPT

## 2024-08-15 PROCEDURE — 2580000003 HC RX 258: Performed by: NURSE PRACTITIONER

## 2024-08-15 PROCEDURE — 82962 GLUCOSE BLOOD TEST: CPT

## 2024-08-15 PROCEDURE — 6360000002 HC RX W HCPCS: Performed by: NURSE PRACTITIONER

## 2024-08-15 PROCEDURE — 83735 ASSAY OF MAGNESIUM: CPT

## 2024-08-15 PROCEDURE — 80048 BASIC METABOLIC PNL TOTAL CA: CPT

## 2024-08-15 RX ORDER — FUROSEMIDE 40 MG/1
40 TABLET ORAL DAILY
Qty: 30 TABLET | Refills: 0 | Status: SHIPPED | OUTPATIENT
Start: 2024-08-15

## 2024-08-15 RX ORDER — LANOLIN ALCOHOL/MO/W.PET/CERES
3 CREAM (GRAM) TOPICAL ONCE
Status: COMPLETED | OUTPATIENT
Start: 2024-08-15 | End: 2024-08-15

## 2024-08-15 RX ADMIN — SODIUM CHLORIDE, PRESERVATIVE FREE 10 ML: 5 INJECTION INTRAVENOUS at 08:18

## 2024-08-15 RX ADMIN — DILTIAZEM HYDROCHLORIDE 60 MG: 60 CAPSULE, EXTENDED RELEASE ORAL at 08:16

## 2024-08-15 RX ADMIN — MELATONIN 3 MG: at 01:02

## 2024-08-15 RX ADMIN — ENOXAPARIN SODIUM 40 MG: 100 INJECTION SUBCUTANEOUS at 08:16

## 2024-08-15 RX ADMIN — TAMSULOSIN HYDROCHLORIDE 0.4 MG: 0.4 CAPSULE ORAL at 08:17

## 2024-08-15 RX ADMIN — POTASSIUM BICARBONATE 40 MEQ: 782 TABLET, EFFERVESCENT ORAL at 10:30

## 2024-08-15 RX ADMIN — Medication 1 AMPULE: at 08:16

## 2024-08-15 RX ADMIN — FUROSEMIDE 40 MG: 40 TABLET ORAL at 08:17

## 2024-08-15 RX ADMIN — Medication: at 08:17

## 2024-08-15 RX ADMIN — PANTOPRAZOLE SODIUM 40 MG: 40 TABLET, DELAYED RELEASE ORAL at 06:48

## 2024-08-15 RX ADMIN — FINASTERIDE 5 MG: 5 TABLET, FILM COATED ORAL at 08:17

## 2024-08-15 RX ADMIN — ASPIRIN 81 MG: 81 TABLET, COATED ORAL at 08:17

## 2024-08-15 ASSESSMENT — PAIN SCALES - GENERAL
PAINLEVEL_OUTOF10: 0
PAINLEVEL_OUTOF10: 0

## 2024-08-15 NOTE — CARE COORDINATION
Patient is clear from a CM standpoint.    Transition of Care Plan: Return to [facility]     RUR: 16% (moderate RUR)  Prior Level of Functioning: Assistance with housework, shopping and cooking  Disposition: Return to [facility] with OP PT/OT script  If SNF or IPR: Date FOC offered: N/A  Date FOC received: N/A  Accepting facility: N/A  Date authorization started with reference number: N/A  Date authorization received and expires: N/A  Follow up appointments: PCP/specialists if needed.  [facility] contact information listed on AVS.  DME needed: None.  Patient has a cane, RW, wheelchair and transport chair at home.    Transportation at discharge: Son to transport  IM/IMM Medicare/ letter given: 2nd IM done 8/15/2024.  Patient verbalized understanding and gave permission for possible discharge within 4 hours of receiving IMM.  Is patient a  and connected with [facility]? No.   If yes, was  transfer form completed and [facility] notified? N/A  Caregiver Contact: Og Jr Ash. - son - [phone]   Discharge Caregiver contacted prior to discharge? Patient to contact.  Care Conference needed? No.  Barriers to discharge: cards to see    1048 - Attending notified during IDRs of need for OP PT/OT script to be provided to patient.  CM met with patient at bedside who confirmed his son would transport him home today if discharged.  RN notified that patient is clear from a CM standpoint.     08/15/24 1054   Services At/After Discharge   Transition of Care Consult (CM Consult) Other  (OP PT/OT script)   Services At/After Discharge PT;OT;Outpatient   Mode of Transport at Discharge Other (see comment)  (Son)   Confirm Follow Up Transport Family   Condition of Participation: Discharge Planning   The Plan for Transition of Care is related to the following treatment goals: Return to [facility] senior living apartment with continued OP PT/OT and follow-up with PCP   The Patient and/or Patient Representative was

## 2024-08-15 NOTE — PROGRESS NOTES
Attempted to schedule hospital follow up PCP appointment. Office  will contact the patient with appointment information per office protocol. [facility] placed [facility] information AVS for patient resource. Pending patient discharge. Brina Molina, Care Management Assistant

## 2024-08-15 NOTE — PROGRESS NOTES
Hospital follow-up CARDIO transitional care appointment has been scheduled with Dr. Magui Lehman on 8/22/24 at 1130. This is the first available appt due to limited provider availability. PCP office does not offer alternate provider option for hospital follow up. [facility] placed [facility] information AVS for patient resource. Pending patient discharge.  Brina Molina , Care Management Assistant

## 2024-08-15 NOTE — DISCHARGE SUMMARY
methIMAzole 5 MG tablet  Commonly known as: TAPAZOLE     tamsulosin 0.4 MG capsule  Commonly known as: FLOMAX     traZODone 50 MG tablet  Commonly known as: DESYREL     vitamin D 1.25 MG (45334 UT) Caps capsule  Commonly known as: ERGOCALCIFEROL            STOP taking these medications      lisinopril 20 MG tablet  Commonly known as: PRINIVIL;ZESTRIL     omeprazole 20 MG delayed release capsule  Commonly known as: PRILOSEC               Where to Get Your Medications        These medications were sent to [facility]/pharmacy #[id number] - [place], [place] - [address] - P [phone] - F [phone]  [address] [facility] [address]      Hours: 24-hours Phone: [phone]   furosemide 40 MG tablet             DISPOSITION:    Home with Family: x      Home with HH/PT/OT/RN:    SNF/LTC:    TG:    OTHER:            Code status: full  Recommended diet: cardiac diet  Recommended activity: activity as tolerated  Wound care: None      Follow up with:   PCP : Herman Ortiz MD    [facility]  Phone: [phone]  Follow up  Please call to have a provider see you at home, if needed.    Herman Ortiz MD  [address] [address] [phone]    Call in 2 day(s)  If office hasn't reached out by 8/19/2024 .    Spencer Kilgore MD  [address] [facility] [address] [phone]    Go on 8/22/2024  At 11:30AM for a CARDIO hospital follow up.          Total time in minutes spent coordinating this discharge (includes going over instructions, follow-up, prescriptions, and preparing report for sign off to her PCP) :  35 minutes

## 2024-08-15 NOTE — CONSULTS
[facility]  [address] [place] [address]  [phone]  [email or web address]       CARDIOLOGY CONSULTATION       Date of  Admission: 8/10/2024  8:37 PM     Admission type:Emergency   Primary Care Physician:Herman Ortiz MD     Attending Provider: Joie Mckeon MD  Cardiology Provider: [facility]  CC/REASON FOR CONSULT: Ventricular tachycardia     Subjective:     Og Aleman is a 89 y.o. male admitted for Acute on chronic clinical systolic heart failure (HCC) [I50.23].    The patient was seen and examined at the bedside.  He denies any complaints.  I spoke to the patient's son over the phone as well.  Patient recently underwent AV melanie ablation and leadless pacemaker/Micra placement last month.  Discussed with Dr. Mckeon with the patient's primary medicine team.  Patient was managed for heart failure and is being planned for discharge.      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    Acute on chronic clinical systolic heart failure (HCC) 08/10/2024    Subungual abscess of toe, right 08/02/2024    Open wound of left foot 08/02/2024    Subungual abscess of toe, left 08/02/2024    Open wound of right foot 08/02/2024    Palliative care encounter 07/10/2024    Hypoxia 07/10/2024    Advanced directives, counseling/discussion 07/10/2024    Full code status 07/10/2024    Diabetic foot ulcer with osteomyelitis ([facility]) 06/27/2024    Acute osteomyelitis of right foot ([facility]) 06/27/2024    Non-pressure chronic ulcer of right lower leg, with fat layer exposed ([facility]) 04/05/2021    Bilateral leg edema 04/05/2021      Herman Ortiz MD  Past Medical History:   Diagnosis Date    Arrhythmia 2017    a-fib    Hypertension     Stroke ([facility]) 2020      Social History     Socioeconomic History    Marital status: Single     Spouse name: None    Number of children: None    Years 
Asked to see for cardiology consult    Pt recently seen by Dr Tobar    Please consult [facility]   
An implanted monitoring device overlies the left heart. Post left atrial occlusion device placement.     Pulmonary edema and small right pleural effusion. Electronically signed by Vel Elena     _______________________________________________________________________    TOTAL TIME:  76 Minutes    Critical Care Provided     Minutes non procedure based    Signed: Fabienne Cao MD    Procedures: see electronic medical records for all procedures/Xrays and details which were not copied into this note but were reviewed prior to creation of Plan.

## 2024-08-15 NOTE — PROGRESS NOTES
Nursing contacted Nocturnist/cross cover provider via non-urgent messaging system [employer] and notified patient 11 beat run vtach, asymptomatic. No other concerns reported. No acute distress reported. No other information provided by nurse. VSS. Ordered EKG stat- pending, asked nurse to obtain the already ordered am labs now and notify me if k below 4.0 or mag below 2.0 would consider to replete, was resumed on lasix yesterday, was k 3.2 yesterday and received 2 doses po repletion per nurse reported. Nurse also asked to notify when EKG results so I may review.. Will defer further evaluation/management to the day shift primary attending care team. Patient denies any further complaints or concerns. Nursing to notify Hospitalist for further/continued concerns. Will remain available overnight for further concerns if nursing/patient needs. Please note, there are RRT systems in this hospital in place that if nursing has acute or critical patient condition change or concern, this is to help facilitate and notify that patient needs immediate bedside evaluation by a provider.     Non-billable note.

## 2024-08-23 ENCOUNTER — HOSPITAL ENCOUNTER (OUTPATIENT)
Facility: HOSPITAL | Age: 89
Discharge: HOME | End: 2024-08-23
Attending: PODIATRIST

## 2024-08-23 VITALS
SYSTOLIC BLOOD PRESSURE: 120 MMHG | HEART RATE: 85 BPM | DIASTOLIC BLOOD PRESSURE: 58 MMHG | RESPIRATION RATE: 18 BRPM | TEMPERATURE: 97.8 F

## 2024-08-23 DIAGNOSIS — L03.031 PARONYCHIA OF SECOND TOE OF RIGHT FOOT: ICD-10-CM

## 2024-08-23 DIAGNOSIS — S91.301D OPEN WOUND OF RIGHT FOOT, SUBSEQUENT ENCOUNTER: Primary | ICD-10-CM

## 2024-08-23 DIAGNOSIS — L03.032 SUBUNGUAL ABSCESS OF TOE, LEFT: ICD-10-CM

## 2024-08-23 RX ORDER — CLOBETASOL PROPIONATE 0.5 MG/G
OINTMENT TOPICAL ONCE
OUTPATIENT
Start: 2024-08-23 | End: 2024-08-23

## 2024-08-23 RX ORDER — LIDOCAINE HYDROCHLORIDE 20 MG/ML
JELLY TOPICAL ONCE
OUTPATIENT
Start: 2024-08-23 | End: 2024-08-23

## 2024-08-23 RX ORDER — IBUPROFEN 200 MG
TABLET ORAL ONCE
OUTPATIENT
Start: 2024-08-23 | End: 2024-08-23

## 2024-08-23 RX ORDER — BACITRACIN ZINC AND POLYMYXIN B SULFATE 500; 1000 [USP'U]/G; [USP'U]/G
OINTMENT TOPICAL ONCE
OUTPATIENT
Start: 2024-08-23 | End: 2024-08-23

## 2024-08-23 RX ORDER — TRIAMCINOLONE ACETONIDE 1 MG/G
OINTMENT TOPICAL ONCE
OUTPATIENT
Start: 2024-08-23 | End: 2024-08-23

## 2024-08-23 RX ORDER — GENTAMICIN SULFATE 1 MG/G
OINTMENT TOPICAL ONCE
OUTPATIENT
Start: 2024-08-23 | End: 2024-08-23

## 2024-08-23 RX ORDER — LIDOCAINE 50 MG/G
OINTMENT TOPICAL ONCE
OUTPATIENT
Start: 2024-08-23 | End: 2024-08-23

## 2024-08-23 RX ORDER — LIDOCAINE 40 MG/G
CREAM TOPICAL ONCE
OUTPATIENT
Start: 2024-08-23 | End: 2024-08-23

## 2024-08-23 RX ORDER — SODIUM CHLOR/HYPOCHLOROUS ACID 0.033 %
SOLUTION, IRRIGATION IRRIGATION ONCE
OUTPATIENT
Start: 2024-08-23 | End: 2024-08-23

## 2024-08-23 RX ORDER — LIDOCAINE HYDROCHLORIDE 40 MG/ML
SOLUTION TOPICAL ONCE
OUTPATIENT
Start: 2024-08-23 | End: 2024-08-23

## 2024-08-23 RX ORDER — BETAMETHASONE DIPROPIONATE 0.5 MG/G
CREAM TOPICAL ONCE
OUTPATIENT
Start: 2024-08-23 | End: 2024-08-23

## 2024-08-23 RX ORDER — GINSENG 100 MG
CAPSULE ORAL ONCE
OUTPATIENT
Start: 2024-08-23 | End: 2024-08-23

## 2024-08-23 NOTE — DISCHARGE INSTRUCTIONS
Discharge Instructions/Wound Care Orders  [facility]  [address]   [address], [address]  [place] [address]   Telephone: [phone]    FAX [phone]      NAME:  Og Aleman  YOB: 1931  MEDICAL RECORD NUMBER:  [id number]  DATE:  8/23/2024    CPT code: E&M-Level 2 (16603)    Congratulations!  You have completed your treatment.     Return to your Primary Care Physician for all your health issues.   Resume your ordinary activities as tolerated.   Take your medications as prescribed by your primary care physician.   Check your skin daily for cracks, bruises, sores, or dryness. Use a moisturizer as needed.   Clean and dry your skin, using mild soap and warm water (not hot).   Avoid alcohol and caffeine and do not smoke.  Maintain a nutritious diet.  Avoid pressure on your wound site. Keep your legs elevated above the level of the heart whenever possible.  Continue to use wraps/stockings/compression as prescribed.  Replace compression stockings every four to six months as needed to ensure proper fit.   Wear well-fitting shoes and leg garments.  Other: To right 2nd toe.. bacitracin and bandaid, . Use Vaseline on feet every day..     THANK YOU FOR ALLOWING US TO SERVE YOU.  PLEASE CALL IF YOU DEVELOP ANOTHER WOUND.     Electronically signed by LEANNE DEMPSEY RN on 8/23/2024 at 2:24 PM     Wound Care Center Information: Should you experience any significant changes in your wound(s) or have questions about your wound care, please contact the [facility] at MONDAY - FRIDAY 8:00 am - 4:30.  If you need help with your wound outside these hours and cannot wait until we are again available, contact your PCP or go to the hospital emergency room.     PLEASE NOTE: IF YOU ARE UNABLE TO OBTAIN WOUND SUPPLIES, CONTINUE TO USE THE SUPPLIES YOU HAVE AVAILABLE UNTIL YOU ARE ABLE TO REACH US. IT IS MOST IMPORTANT TO KEEP THE WOUND COVERED AT ALL TIMES.     Physician

## 2024-08-23 NOTE — FLOWSHEET NOTE
08/23/24 1317   Wound 08/11/24 Toe (Comment  which one) Right;Anterior Blanching redness on great toe   Date First Assessed/Time First Assessed: 08/11/24 0020   Present on Original Admission: Yes  Location: Toe (Comment  which one)  Wound Location Orientation: Right;Anterior  Wound Description (Comments): Blanching redness on great toe   Wound Image    Dressing Status Dry   Wound Length (cm) 0 cm   Wound Width (cm) 0 cm   Wound Depth (cm) 0 cm   Wound Surface Area (cm^2) 0 cm^2   Wound Volume (cm^3) 0 cm^3   Wound Assessment Dry   Drainage Amount None (dry)   Odor None   Wound 08/11/24 Toe (Comment  which one) Left;Anterior Blanching redness on great toe   Date First Assessed/Time First Assessed: 08/11/24 0020   Location: Toe (Comment  which one)  Wound Location Orientation: Left;Anterior  Wound Description (Comments): Blanching redness on great toe   Wound Image    Dressing Status Dry   Wound Length (cm) 0 cm   Wound Width (cm) 0 cm   Wound Depth (cm) 0 cm   Wound Surface Area (cm^2) 0 cm^2   Wound Volume (cm^3) 0 cm^3   Wound Assessment Dry   Drainage Amount None (dry)   Odor None   Pain Assessment   Pain Assessment None - Denies Pain         BP (!) 120/58   Pulse 85   Temp 97.8 °F (36.6 °C) (Temporal)   Resp 18

## 2024-08-23 NOTE — WOUND CARE
[facility]   Progress Note and Procedure Note   NO Procedure      Og Aleman  MEDICAL RECORD NUMBER:  [id number]  AGE: 89 y.o. RACE White (non-)  GENDER: male  : 1931  EPISODE DATE:  2024    Subjective:     Chief Complaint   Patient presents with    Wound Check         HISTORY of PRESENT ILLNESS HPI    Og Aleman is a 89 y.o. male who presents today for wound/ulcer evaluation.   Wound/Ulcer Pain Timing/Severity: none          Ulcer Identification:  Ulcer Type: pressure    Contributing Factors: venous stasis, decreased mobility, and arterial insufficiency        PAST MEDICAL HISTORY    Past Medical History:   Diagnosis Date    Arrhythmia     a-fib    Hypertension     Stroke (HCC)         PAST SURGICAL HISTORY    Past Surgical History:   Procedure Laterality Date    EP DEVICE PROCEDURE N/A 2024    Insert or replace transcath PPM leadless performed by Cal Tobar MD at [facility] CARDIAC CATH LAB    EP DEVICE PROCEDURE N/A 2024    Ablation AV node performed by Cal Tobar MD at [facility] CARDIAC CATH LAB    OTHER SURGICAL HISTORY  2021    Watchman placement    PACEMAKER      UROLOGICAL SURGERY  2016    bladder stone removal       FAMILY HISTORY    History reviewed. No pertinent family history.    SOCIAL HISTORY    Social History     Tobacco Use    Smoking status: Former     Current packs/day: 0.00     Types: Cigarettes     Quit date: 1995     Years since quittin.6    Smokeless tobacco: Never   Substance Use Topics    Alcohol use: Not Currently     Alcohol/week: 1.0 standard drink of alcohol     Types: 1 Standard drinks or equivalent per week    Drug use: Never       ALLERGIES    No Known Allergies    MEDICATIONS    Current Outpatient Medications on File Prior to Encounter   Medication Sig Dispense Refill    furosemide (LASIX) 40 MG tablet Take 1 tablet by mouth daily 30 tablet 0    dilTIAZem (CARDIZEM 12 HR) 60 MG extended release

## (undated) DEVICE — CATHETER ABLAT 8FR L115CM 1-6-2MM SPC TIP 3.5MM FJ CRV

## (undated) DEVICE — PINNACLE INTRODUCER SHEATH: Brand: PINNACLE

## (undated) DEVICE — INTRODUCER SHTH W51XH73XL557MM D13MM DIA7.8MM HYDRPHLC

## (undated) DEVICE — AMPLATZ EXTRA STIFF WIRE GUIDE: Brand: AMPLATZ

## (undated) DEVICE — MEDI-TRACE CADENCE ADULT, DEFIBRILLATION ELECTRODE -RTS  (10 PR/PK) - PHYSIO-CONTROL: Brand: MEDI-TRACE CADENCE

## (undated) DEVICE — PRESSURE MONITORING SET: Brand: TRUWAVE

## (undated) DEVICE — CABLE CATH L10FT RED PIN CONN 34-34 FOR THERMOCOOL

## (undated) DEVICE — FASCIAL DILATOR SET: Brand: COOK

## (undated) DEVICE — VIZIGO MEDIUM

## (undated) DEVICE — TUBING PMP FOR CARTO SYS SMARTABLATE

## (undated) DEVICE — PATCH REF EXT FOR CARTO 3 SYS (EA = 6 PACKS)

## (undated) DEVICE — ELECTRODE PT RET AD L9FT HI MOIST COND ADH HYDRGEL CORDED

## (undated) DEVICE — HEART CATH-MRMC: Brand: MEDLINE INDUSTRIES, INC.